# Patient Record
Sex: MALE | Race: ASIAN | NOT HISPANIC OR LATINO | Employment: FULL TIME | URBAN - METROPOLITAN AREA
[De-identification: names, ages, dates, MRNs, and addresses within clinical notes are randomized per-mention and may not be internally consistent; named-entity substitution may affect disease eponyms.]

---

## 2017-01-04 ENCOUNTER — HOSPITAL ENCOUNTER (OUTPATIENT)
Dept: LAB | Facility: MEDICAL CENTER | Age: 57
End: 2017-01-04
Attending: INTERNAL MEDICINE
Payer: COMMERCIAL

## 2017-01-04 DIAGNOSIS — I25.84 CORONARY ARTERY DISEASE DUE TO CALCIFIED CORONARY LESION: ICD-10-CM

## 2017-01-04 DIAGNOSIS — I10 ESSENTIAL HYPERTENSION: ICD-10-CM

## 2017-01-04 DIAGNOSIS — I25.10 CORONARY ARTERY DISEASE DUE TO CALCIFIED CORONARY LESION: ICD-10-CM

## 2017-01-04 DIAGNOSIS — E78.5 HYPERLIPIDEMIA: ICD-10-CM

## 2017-01-04 LAB
ALBUMIN SERPL BCP-MCNC: 4.5 G/DL (ref 3.2–4.9)
ALBUMIN/GLOB SERPL: 1.3 G/DL
ALP SERPL-CCNC: 142 U/L (ref 30–99)
ALT SERPL-CCNC: 88 U/L (ref 2–50)
ANION GAP SERPL CALC-SCNC: 6 MMOL/L (ref 0–11.9)
AST SERPL-CCNC: 65 U/L (ref 12–45)
BILIRUB SERPL-MCNC: 0.7 MG/DL (ref 0.1–1.5)
BUN SERPL-MCNC: 28 MG/DL (ref 8–22)
CALCIUM SERPL-MCNC: 9.7 MG/DL (ref 8.4–10.2)
CHLORIDE SERPL-SCNC: 107 MMOL/L (ref 96–112)
CHOLEST SERPL-MCNC: 174 MG/DL (ref 100–199)
CO2 SERPL-SCNC: 26 MMOL/L (ref 20–33)
CREAT SERPL-MCNC: 1.87 MG/DL (ref 0.5–1.4)
GFR SERPL CREATININE-BSD FRML MDRD: 38 ML/MIN/1.73 M 2
GLOBULIN SER CALC-MCNC: 3.4 G/DL (ref 1.9–3.5)
GLUCOSE SERPL-MCNC: 107 MG/DL (ref 65–99)
HDLC SERPL-MCNC: 36 MG/DL
LDLC SERPL CALC-MCNC: 107 MG/DL
POTASSIUM SERPL-SCNC: 4.1 MMOL/L (ref 3.6–5.5)
PROT SERPL-MCNC: 7.9 G/DL (ref 6–8.2)
SODIUM SERPL-SCNC: 139 MMOL/L (ref 135–145)
TRIGL SERPL-MCNC: 153 MG/DL (ref 0–149)

## 2017-01-04 PROCEDURE — 36415 COLL VENOUS BLD VENIPUNCTURE: CPT

## 2017-01-04 PROCEDURE — 80053 COMPREHEN METABOLIC PANEL: CPT

## 2017-01-04 PROCEDURE — 80061 LIPID PANEL: CPT

## 2017-01-10 ENCOUNTER — OFFICE VISIT (OUTPATIENT)
Dept: CARDIOLOGY | Facility: MEDICAL CENTER | Age: 57
End: 2017-01-10
Payer: COMMERCIAL

## 2017-01-10 VITALS
BODY MASS INDEX: 36.65 KG/M2 | HEART RATE: 70 BPM | DIASTOLIC BLOOD PRESSURE: 74 MMHG | WEIGHT: 220 LBS | SYSTOLIC BLOOD PRESSURE: 124 MMHG | HEIGHT: 65 IN | OXYGEN SATURATION: 94 %

## 2017-01-10 DIAGNOSIS — I10 ESSENTIAL HYPERTENSION: ICD-10-CM

## 2017-01-10 DIAGNOSIS — I25.84 CORONARY ARTERY DISEASE DUE TO CALCIFIED CORONARY LESION: ICD-10-CM

## 2017-01-10 DIAGNOSIS — Z95.1 S/P CABG (CORONARY ARTERY BYPASS GRAFT): Chronic | ICD-10-CM

## 2017-01-10 DIAGNOSIS — E78.5 DYSLIPIDEMIA: ICD-10-CM

## 2017-01-10 DIAGNOSIS — I25.10 CORONARY ARTERY DISEASE DUE TO CALCIFIED CORONARY LESION: ICD-10-CM

## 2017-01-10 PROCEDURE — 99214 OFFICE O/P EST MOD 30 MIN: CPT | Performed by: INTERNAL MEDICINE

## 2017-01-10 NOTE — Clinical Note
[facility]   [address]  [place] [address]  Phone: [phone]  Fax: [phone]              Severo Coleman  1960    Encounter Date: 1/10/2017    Tez Tena M.D.          PROGRESS NOTE:  Subjective:   Severo Coleman is a 56 y.o. male who presents today for followup of his coronary artery disease with prior coronary artery bypass graft surgery, hypertension and hyperlipidemia.     He occasionally notes some edema. He has had no chest discomfort, dyspnea, palpitations or lightheadedness.        Past Medical History   Diagnosis Date   • CAD (coronary artery disease)    • Obesity 5/14/2012   • Hyperlipidemia 5/14/2012   • S/P CABG (coronary artery bypass graft)    • HTN (hypertension) 6/30/2014   • Heart valve disease    • Renal disorder      kidney stones   • High cholesterol    • Myocardial infarct (HCC) 1998   • Congestive heart failure (HCC)      Past Surgical History   Procedure Laterality Date   • Multiple coronary artery bypass  1998     Family History   Problem Relation Age of Onset   • Heart Attack Mother      History   Smoking status   • Never Smoker    Smokeless tobacco   • Not on file     No Known Allergies  Outpatient Encounter Prescriptions as of 1/10/2017   Medication Sig Dispense Refill   • atenolol (TENORMIN) 25 MG Tab Take 1 Tab by mouth every day. 30 Tab 11   • losartan (COZAAR) 25 MG Tab Take 25 mg by mouth every day.     • vitamin D, Ergocalciferol, (DRISDOL) 93047 UNITS Cap capsule Take  by mouth every 14 days.     • folic acid (FOLVITE) 1 MG Tab Take 2 Tabs by mouth 2 Times a Day. 120 Tab 11   • rosuvastatin (CRESTOR) 40 MG tablet Take 1 Tab by mouth every evening. 90 Tab 1   • Pyridoxine HCl (VITAMIN B6 PO) Take  by mouth every day.     • Coenzyme Q10 (CO Q-10 PO) Take  by mouth every day.     • cyanocobalamin (VITAMIN B-12) 100 MCG TABS Take 100 mcg by mouth every day.       • aspirin 81 MG tablet Take 81 mg by  "mouth every day.       No facility-administered encounter medications on file as of 1/10/2017.     ROS       Objective:   /74 mmHg  Pulse 70  Ht 1.651 m (5' 5\")  Wt 99.791 kg (220 lb)  BMI 36.61 kg/m2  SpO2 94%    Physical Exam   Neck: No JVD present.   Cardiovascular: Normal rate and regular rhythm.  Exam reveals no gallop.    No murmur heard.  Pulmonary/Chest: Effort normal. He has no rales.   Abdominal: Soft. There is no tenderness.   Musculoskeletal: He exhibits no edema.       Assessment:     1. Coronary artery disease with a history of coronary bypass graft surgery     2. Dyslipidemia     3. Essential hypertension         Medical Decision Making:  Today's Assessment / Status / Plan:     Subjective:   Severo Coleman is a 54 y.o. male who presents today for followup of his coronary artery disease with prior coronary artery bypass graft surgery, hypertension and hyperlipidemia. He has had some elevation of his liver function tests on statin therapy. However, these seem to be chronic.    His blood pressures at home and been under good control running approximately 120-130/60-70.     He denies any chest discomfort, dyspnea, edema, palpitations or lightheadedness.        Past Medical History   Diagnosis Date   • CAD (coronary artery disease)    • Obesity 5/14/2012   • Hyperlipidemia 5/14/2012   • S/P CABG (coronary artery bypass graft)    • HTN (hypertension) 6/30/2014   • Heart valve disease    • Renal disorder      kidney stones   • High cholesterol    • Myocardial infarct (HCC) 1998   • Congestive heart failure (HCC)      Past Surgical History   Procedure Laterality Date   • Multiple coronary artery bypass  1998     Family History   Problem Relation Age of Onset   • Heart Attack Mother      History   Smoking status   • Never Smoker    Smokeless tobacco   • Not on file     No Known Allergies  Outpatient Encounter Prescriptions as of 1/10/2017   Medication Sig Dispense Refill   • atenolol (TENORMIN) " "25 MG Tab Take 1 Tab by mouth every day. 30 Tab 11   • losartan (COZAAR) 25 MG Tab Take 25 mg by mouth every day.     • vitamin D, Ergocalciferol, (DRISDOL) 17840 UNITS Cap capsule Take  by mouth every 14 days.     • folic acid (FOLVITE) 1 MG Tab Take 2 Tabs by mouth 2 Times a Day. 120 Tab 11   • rosuvastatin (CRESTOR) 40 MG tablet Take 1 Tab by mouth every evening. 90 Tab 1   • Pyridoxine HCl (VITAMIN B6 PO) Take  by mouth every day.     • Coenzyme Q10 (CO Q-10 PO) Take  by mouth every day.     • cyanocobalamin (VITAMIN B-12) 100 MCG TABS Take 100 mcg by mouth every day.       • aspirin 81 MG tablet Take 81 mg by mouth every day.       No facility-administered encounter medications on file as of 1/10/2017.     ROS       Objective:   /74 mmHg  Pulse 70  Ht 1.651 m (5' 5\")  Wt 99.791 kg (220 lb)  BMI 36.61 kg/m2  SpO2 94%    Physical Exam   Neck: No JVD present.   Cardiovascular: Normal rate and regular rhythm.  Exam reveals no gallop.    No murmur heard.  Pulmonary/Chest: Effort normal. He has no rales.   Abdominal: Soft. There is no tenderness.   Musculoskeletal: He exhibits no edema.     Echocardiogram:  CONCLUSIONS  Normal left ventricular systolic function.  Mild to moderate concentric left ventricular hypertrophy.  Left ventricular ejection fraction is 55% to 60%.  No significant valve abnormalities.   Final Date: 27 August 2013     Lab Results   Component Value Date/Time    CHOLESTEROL, 01/04/2017 09:16 AM    * 01/04/2017 09:16 AM    HDL 36* 01/04/2017 09:16 AM    TRIGLYCERIDES 153* 01/04/2017 09:16 AM       Lab Results   Component Value Date/Time    SODIUM 139 01/04/2017 09:16 AM    POTASSIUM 4.1 01/04/2017 09:16 AM    CHLORIDE 107 01/04/2017 09:16 AM    CO2 26 01/04/2017 09:16 AM    GLUCOSE 107* 01/04/2017 09:16 AM    BUN 28* 01/04/2017 09:16 AM    CREATININE 1.87* 01/04/2017 09:16 AM     Lab Results   Component Value Date/Time    ALKALINE PHOSPHATASE 142* 01/04/2017 09:16 AM    " AST(SGOT) 65* 01/04/2017 09:16 AM    ALT(SGPT) 88* 01/04/2017 09:16 AM    TOTAL BILIRUBIN 0.7 01/04/2017 09:16 AM      No results found for: BNPBTYPENAT           Assessment:     1. Coronary artery disease with a history of coronary bypass graft surgery     2. Dyslipidemia     3. Essential hypertension         Medical Decision Making:  Today's Assessment / Status / Plan:     Mr. Coleman is clinically stable. His liver functions are chronically elevated but his lipid status appears to be under good control on Crestor and ezetimibe. He will continue these medications and follow-up in 6 months with repeat lab work. He is asymptomatic from a cardiovascular standpoint and his blood pressure is under good control. However, his renal function is deteriorating and I feel he should see his PCP. He may need referral to nephrology.  Subjective:   Severo Coleman is a 54 y.o. male who presents today for followup of his coronary artery disease with prior coronary artery bypass graft surgery, hypertension and hyperlipidemia. He has had some elevation of his liver function tests on statin therapy. However, these seem to be chronic.    His blood pressures at home and been under good control running approximately 120-130/60-70.     He denies any chest discomfort, dyspnea, edema, palpitations or lightheadedness.        Past Medical History   Diagnosis Date   • CAD (coronary artery disease)    • Obesity 5/14/2012   • Hyperlipidemia 5/14/2012   • S/P CABG (coronary artery bypass graft)    • HTN (hypertension) 6/30/2014   • Heart valve disease    • Renal disorder      kidney stones   • High cholesterol    • Myocardial infarct (HCC) 1998   • Congestive heart failure (HCC)      Past Surgical History   Procedure Laterality Date   • Multiple coronary artery bypass  1998     Family History   Problem Relation Age of Onset   • Heart Attack Mother      History   Smoking status   • Never Smoker    Smokeless tobacco   • Not on file     No  "Known Allergies  Outpatient Encounter Prescriptions as of 1/10/2017   Medication Sig Dispense Refill   • atenolol (TENORMIN) 25 MG Tab Take 1 Tab by mouth every day. 30 Tab 11   • losartan (COZAAR) 25 MG Tab Take 25 mg by mouth every day.     • vitamin D, Ergocalciferol, (DRISDOL) 12496 UNITS Cap capsule Take  by mouth every 14 days.     • folic acid (FOLVITE) 1 MG Tab Take 2 Tabs by mouth 2 Times a Day. 120 Tab 11   • rosuvastatin (CRESTOR) 40 MG tablet Take 1 Tab by mouth every evening. 90 Tab 1   • Pyridoxine HCl (VITAMIN B6 PO) Take  by mouth every day.     • Coenzyme Q10 (CO Q-10 PO) Take  by mouth every day.     • cyanocobalamin (VITAMIN B-12) 100 MCG TABS Take 100 mcg by mouth every day.       • aspirin 81 MG tablet Take 81 mg by mouth every day.       No facility-administered encounter medications on file as of 1/10/2017.     ROS       Objective:   /74 mmHg  Pulse 70  Ht 1.651 m (5' 5\")  Wt 99.791 kg (220 lb)  BMI 36.61 kg/m2  SpO2 94%    Physical Exam   Neck: No JVD present.   Cardiovascular: Normal rate and regular rhythm.  Exam reveals no gallop.    No murmur heard.  Pulmonary/Chest: Effort normal. He has no rales.   Abdominal: Soft. There is no tenderness.   Musculoskeletal: He exhibits no edema.     Echocardiogram:  CONCLUSIONS  Normal left ventricular systolic function.  Mild to moderate concentric left ventricular hypertrophy.  Left ventricular ejection fraction is 55% to 60%.  No significant valve abnormalities.   Final Date: 27 August 2013     Lab Results   Component Value Date/Time    CHOLESTEROL, 01/04/2017 09:16 AM    * 01/04/2017 09:16 AM    HDL 36* 01/04/2017 09:16 AM    TRIGLYCERIDES 153* 01/04/2017 09:16 AM       Lab Results   Component Value Date/Time    SODIUM 139 01/04/2017 09:16 AM    POTASSIUM 4.1 01/04/2017 09:16 AM    CHLORIDE 107 01/04/2017 09:16 AM    CO2 26 01/04/2017 09:16 AM    GLUCOSE 107* 01/04/2017 09:16 AM    BUN 28* 01/04/2017 09:16 AM    CREATININE " 1.87* 01/04/2017 09:16 AM     Lab Results   Component Value Date/Time    ALKALINE PHOSPHATASE 142* 01/04/2017 09:16 AM    AST(SGOT) 65* 01/04/2017 09:16 AM    ALT(SGPT) 88* 01/04/2017 09:16 AM    TOTAL BILIRUBIN 0.7 01/04/2017 09:16 AM      No results found for: BNPBTYPENAT           Assessment:     1. Coronary artery disease with a history of coronary bypass graft surgery     2. Dyslipidemia     3. Essential hypertension         Medical Decision Making:  Today's Assessment / Status / Plan:     Mr. Coleman is clinically stable. He is on maximal statin therapy and his LDL is still not under good control. At this time, we will see if we can get him approved for a PCS K9 inhibitor. He'll have lab work in 6 weeks and prior to follow-up in 6 months. His blood pressure has been under good control. He is asymptomatic from a cardiovascular standpoint but not getting much in the way of aerobic conditioning. I would like him to walk at least 30 minutes 3 times a week. In addition, I would like him to get and activity tracker and obtain at least 10,000 steps daily.      Dmitriy Anglin D.O.  [address]   [address]  [facility] [address]  VIA Facsimile: [phone]

## 2017-01-10 NOTE — PROGRESS NOTES
"Subjective:   Severo Coleman is a 56 y.o. male who presents today for followup of his coronary artery disease with prior coronary artery bypass graft surgery, hypertension and hyperlipidemia.     He occasionally notes some edema. He has had no chest discomfort, dyspnea, palpitations or lightheadedness.        Past Medical History   Diagnosis Date   • CAD (coronary artery disease)    • Obesity 5/14/2012   • Hyperlipidemia 5/14/2012   • S/P CABG (coronary artery bypass graft)    • HTN (hypertension) 6/30/2014   • Heart valve disease    • Renal disorder      kidney stones   • High cholesterol    • Myocardial infarct (HCC) 1998   • Congestive heart failure (HCC)      Past Surgical History   Procedure Laterality Date   • Multiple coronary artery bypass  1998     Family History   Problem Relation Age of Onset   • Heart Attack Mother      History   Smoking status   • Never Smoker    Smokeless tobacco   • Not on file     No Known Allergies  Outpatient Encounter Prescriptions as of 1/10/2017   Medication Sig Dispense Refill   • atenolol (TENORMIN) 25 MG Tab Take 1 Tab by mouth every day. 30 Tab 11   • losartan (COZAAR) 25 MG Tab Take 25 mg by mouth every day.     • vitamin D, Ergocalciferol, (DRISDOL) 95056 UNITS Cap capsule Take  by mouth every 14 days.     • folic acid (FOLVITE) 1 MG Tab Take 2 Tabs by mouth 2 Times a Day. 120 Tab 11   • rosuvastatin (CRESTOR) 40 MG tablet Take 1 Tab by mouth every evening. 90 Tab 1   • Pyridoxine HCl (VITAMIN B6 PO) Take  by mouth every day.     • Coenzyme Q10 (CO Q-10 PO) Take  by mouth every day.     • cyanocobalamin (VITAMIN B-12) 100 MCG TABS Take 100 mcg by mouth every day.       • aspirin 81 MG tablet Take 81 mg by mouth every day.       No facility-administered encounter medications on file as of 1/10/2017.     ROS       Objective:   /74 mmHg  Pulse 70  Ht 1.651 m (5' 5\")  Wt 99.791 kg (220 lb)  BMI 36.61 kg/m2  SpO2 94%    Physical Exam   Neck: No JVD present. "   Cardiovascular: Normal rate and regular rhythm.  Exam reveals no gallop.    No murmur heard.  Pulmonary/Chest: Effort normal. He has no rales.   Abdominal: Soft. There is no tenderness.   Musculoskeletal: He exhibits no edema.       Assessment:     1. Coronary artery disease with a history of coronary bypass graft surgery     2. Dyslipidemia     3. Essential hypertension         Medical Decision Making:  Today's Assessment / Status / Plan:     Subjective:   Severo Coleman is a 54 y.o. male who presents today for followup of his coronary artery disease with prior coronary artery bypass graft surgery, hypertension and hyperlipidemia. He has had some elevation of his liver function tests on statin therapy. However, these seem to be chronic.    His blood pressures at home and been under good control running approximately 120-130/60-70.     He denies any chest discomfort, dyspnea, edema, palpitations or lightheadedness.        Past Medical History   Diagnosis Date   • CAD (coronary artery disease)    • Obesity 5/14/2012   • Hyperlipidemia 5/14/2012   • S/P CABG (coronary artery bypass graft)    • HTN (hypertension) 6/30/2014   • Heart valve disease    • Renal disorder      kidney stones   • High cholesterol    • Myocardial infarct (HCC) 1998   • Congestive heart failure (HCC)      Past Surgical History   Procedure Laterality Date   • Multiple coronary artery bypass  1998     Family History   Problem Relation Age of Onset   • Heart Attack Mother      History   Smoking status   • Never Smoker    Smokeless tobacco   • Not on file     No Known Allergies  Outpatient Encounter Prescriptions as of 1/10/2017   Medication Sig Dispense Refill   • atenolol (TENORMIN) 25 MG Tab Take 1 Tab by mouth every day. 30 Tab 11   • losartan (COZAAR) 25 MG Tab Take 25 mg by mouth every day.     • vitamin D, Ergocalciferol, (DRISDOL) 60624 UNITS Cap capsule Take  by mouth every 14 days.     • folic acid (FOLVITE) 1 MG Tab Take 2 Tabs by  "mouth 2 Times a Day. 120 Tab 11   • rosuvastatin (CRESTOR) 40 MG tablet Take 1 Tab by mouth every evening. 90 Tab 1   • Pyridoxine HCl (VITAMIN B6 PO) Take  by mouth every day.     • Coenzyme Q10 (CO Q-10 PO) Take  by mouth every day.     • cyanocobalamin (VITAMIN B-12) 100 MCG TABS Take 100 mcg by mouth every day.       • aspirin 81 MG tablet Take 81 mg by mouth every day.       No facility-administered encounter medications on file as of 1/10/2017.     ROS       Objective:   /74 mmHg  Pulse 70  Ht 1.651 m (5' 5\")  Wt 99.791 kg (220 lb)  BMI 36.61 kg/m2  SpO2 94%    Physical Exam   Neck: No JVD present.   Cardiovascular: Normal rate and regular rhythm.  Exam reveals no gallop.    No murmur heard.  Pulmonary/Chest: Effort normal. He has no rales.   Abdominal: Soft. There is no tenderness.   Musculoskeletal: He exhibits no edema.     Echocardiogram:  CONCLUSIONS  Normal left ventricular systolic function.  Mild to moderate concentric left ventricular hypertrophy.  Left ventricular ejection fraction is 55% to 60%.  No significant valve abnormalities.   Final Date: 27 August 2013     Lab Results   Component Value Date/Time    CHOLESTEROL, 01/04/2017 09:16 AM    * 01/04/2017 09:16 AM    HDL 36* 01/04/2017 09:16 AM    TRIGLYCERIDES 153* 01/04/2017 09:16 AM       Lab Results   Component Value Date/Time    SODIUM 139 01/04/2017 09:16 AM    POTASSIUM 4.1 01/04/2017 09:16 AM    CHLORIDE 107 01/04/2017 09:16 AM    CO2 26 01/04/2017 09:16 AM    GLUCOSE 107* 01/04/2017 09:16 AM    BUN 28* 01/04/2017 09:16 AM    CREATININE 1.87* 01/04/2017 09:16 AM     Lab Results   Component Value Date/Time    ALKALINE PHOSPHATASE 142* 01/04/2017 09:16 AM    AST(SGOT) 65* 01/04/2017 09:16 AM    ALT(SGPT) 88* 01/04/2017 09:16 AM    TOTAL BILIRUBIN 0.7 01/04/2017 09:16 AM      No results found for: BNPBTYPENAT           Assessment:     1. Coronary artery disease with a history of coronary bypass graft surgery     2. " Dyslipidemia     3. Essential hypertension         Medical Decision Making:  Today's Assessment / Status / Plan:     Mr. Coleman is clinically stable. His liver functions are chronically elevated but his lipid status appears to be under good control on Crestor and ezetimibe. He will continue these medications and follow-up in 6 months with repeat lab work. He is asymptomatic from a cardiovascular standpoint and his blood pressure is under good control. However, his renal function is deteriorating and I feel he should see his PCP. He may need referral to nephrology.  Subjective:   Severo Coleman is a 54 y.o. male who presents today for followup of his coronary artery disease with prior coronary artery bypass graft surgery, hypertension and hyperlipidemia. He has had some elevation of his liver function tests on statin therapy. However, these seem to be chronic.    His blood pressures at home and been under good control running approximately 120-130/60-70.     He denies any chest discomfort, dyspnea, edema, palpitations or lightheadedness.        Past Medical History   Diagnosis Date   • CAD (coronary artery disease)    • Obesity 5/14/2012   • Hyperlipidemia 5/14/2012   • S/P CABG (coronary artery bypass graft)    • HTN (hypertension) 6/30/2014   • Heart valve disease    • Renal disorder      kidney stones   • High cholesterol    • Myocardial infarct (HCC) 1998   • Congestive heart failure (HCC)      Past Surgical History   Procedure Laterality Date   • Multiple coronary artery bypass  1998     Family History   Problem Relation Age of Onset   • Heart Attack Mother      History   Smoking status   • Never Smoker    Smokeless tobacco   • Not on file     No Known Allergies  Outpatient Encounter Prescriptions as of 1/10/2017   Medication Sig Dispense Refill   • atenolol (TENORMIN) 25 MG Tab Take 1 Tab by mouth every day. 30 Tab 11   • losartan (COZAAR) 25 MG Tab Take 25 mg by mouth every day.     • vitamin D,  "Ergocalciferol, (DRISDOL) 40370 UNITS Cap capsule Take  by mouth every 14 days.     • folic acid (FOLVITE) 1 MG Tab Take 2 Tabs by mouth 2 Times a Day. 120 Tab 11   • rosuvastatin (CRESTOR) 40 MG tablet Take 1 Tab by mouth every evening. 90 Tab 1   • Pyridoxine HCl (VITAMIN B6 PO) Take  by mouth every day.     • Coenzyme Q10 (CO Q-10 PO) Take  by mouth every day.     • cyanocobalamin (VITAMIN B-12) 100 MCG TABS Take 100 mcg by mouth every day.       • aspirin 81 MG tablet Take 81 mg by mouth every day.       No facility-administered encounter medications on file as of 1/10/2017.     ROS       Objective:   /74 mmHg  Pulse 70  Ht 1.651 m (5' 5\")  Wt 99.791 kg (220 lb)  BMI 36.61 kg/m2  SpO2 94%    Physical Exam   Neck: No JVD present.   Cardiovascular: Normal rate and regular rhythm.  Exam reveals no gallop.    No murmur heard.  Pulmonary/Chest: Effort normal. He has no rales.   Abdominal: Soft. There is no tenderness.   Musculoskeletal: He exhibits no edema.     Echocardiogram:  CONCLUSIONS  Normal left ventricular systolic function.  Mild to moderate concentric left ventricular hypertrophy.  Left ventricular ejection fraction is 55% to 60%.  No significant valve abnormalities.   Final Date: 27 August 2013     Lab Results   Component Value Date/Time    CHOLESTEROL, 01/04/2017 09:16 AM    * 01/04/2017 09:16 AM    HDL 36* 01/04/2017 09:16 AM    TRIGLYCERIDES 153* 01/04/2017 09:16 AM       Lab Results   Component Value Date/Time    SODIUM 139 01/04/2017 09:16 AM    POTASSIUM 4.1 01/04/2017 09:16 AM    CHLORIDE 107 01/04/2017 09:16 AM    CO2 26 01/04/2017 09:16 AM    GLUCOSE 107* 01/04/2017 09:16 AM    BUN 28* 01/04/2017 09:16 AM    CREATININE 1.87* 01/04/2017 09:16 AM     Lab Results   Component Value Date/Time    ALKALINE PHOSPHATASE 142* 01/04/2017 09:16 AM    AST(SGOT) 65* 01/04/2017 09:16 AM    ALT(SGPT) 88* 01/04/2017 09:16 AM    TOTAL BILIRUBIN 0.7 01/04/2017 09:16 AM      No results found " for: BNPBTYPENAT           Assessment:     1. Coronary artery disease with a history of coronary bypass graft surgery     2. Dyslipidemia     3. Essential hypertension         Medical Decision Making:  Today's Assessment / Status / Plan:     Mr. Coleman is clinically stable. He is on maximal statin therapy and his LDL is still not under good control. At this time, we will see if we can get him approved for a PCS K9 inhibitor. He'll have lab work in 6 weeks and prior to follow-up in 6 months. His blood pressure has been under good control. He is asymptomatic from a cardiovascular standpoint but not getting much in the way of aerobic conditioning. I would like him to walk at least 30 minutes 3 times a week. In addition, I would like him to get and activity tracker and obtain at least 10,000 steps daily.

## 2017-01-10 NOTE — MR AVS SNAPSHOT
"Severo Coleman   1/10/2017 8:30 AM   Office Visit   MRN: [id number]    Department:  [facility] [place] [facility]   Dept Phone:  [phone]    Description:  Male : 1960   Provider:  Tez Tena M.D.           Allergies as of 1/10/2017     No Known Allergies      You were diagnosed with     Coronary artery disease due to calcified coronary lesion   [5099338]       Dyslipidemia   [659877]       Essential hypertension   [9181583]         Vital Signs     Blood Pressure Pulse Height Weight Body Mass Index Oxygen Saturation    124/74 mmHg 70 1.651 m (5' 5\") 99.791 kg (220 lb) 36.61 kg/m2 94%    Smoking Status                   Never Smoker            Basic Information     Date Of Birth Sex Race Ethnicity Preferred Language    1960 Male  Non- English      Your appointments     Murtaza 10, 2017  8:30 AM   FOLLOW UP with Tez Tena M.D.   [facility] (--)    [address], [address]  [facility] [address]   [phone]              Problem List              ICD-10-CM Priority Class Noted - Resolved        Unknown - Present    Coronary artery disease with a history of coronary bypass graft surgery I25.10, I25.84   2012 - Present    Obesity E66.9   2012 - Present    Dyslipidemia E78.5   2012 - Present    S/P CABG (coronary artery bypass graft) (Chronic) Z95.1   Unknown - Present    Essential hypertension I10   2014 - Present      Health Maintenance        Date Due Completion Dates    IMM DTaP/Tdap/Td Vaccine (1 - Tdap) 1980 ---    COLONOSCOPY 2011 ---    IMM INFLUENZA (1) 2016 ---            Current Immunizations     No immunizations on file.      Below and/or attached are the medications your provider expects you to take. Review all of your home medications and newly ordered medications with your provider and/or pharmacist. Follow medication instructions as directed by your provider and/or pharmacist. Please keep your " medication list with you and share with your provider. Update the information when medications are discontinued, doses are changed, or new medications (including over-the-counter products) are added; and carry medication information at all times in the event of emergency situations     Allergies:  No Known Allergies          Medications  Valid as of: January 10, 2017 -  8:14 AM    Generic Name Brand Name Tablet Size Instructions for use    Aspirin (Tab) aspirin 81 MG Take 81 mg by mouth every day.        Atenolol (Tab) TENORMIN 25 MG Take 1 Tab by mouth every day.        Coenzyme Q10   Take  by mouth every day.        Cyanocobalamin (Tab) VITAMIN B-12 100 MCG Take 100 mcg by mouth every day.          Ergocalciferol (Cap) DRISDOL 55620 UNITS Take  by mouth every 14 days.        Folic Acid (Tab) FOLVITE 1 MG Take 2 Tabs by mouth 2 Times a Day.        Losartan Potassium (Tab) COZAAR 25 MG Take 25 mg by mouth every day.        Pyridoxine HCl   Take  by mouth every day.        Rosuvastatin Calcium (Tab) CRESTOR 40 MG Take 1 Tab by mouth every evening.        .                 Medicines prescribed today were sent to:     [facility] - [place], [place] - [address] [address]    Phone: [phone] Fax: [phone]    Open 24 Hours?: No    [facility] - [place], [place] - [address] [address]    Phone: [phone] Fax: [phone]    Open 24 Hours?: No      Medication refill instructions:       If your prescription bottle indicates you have medication refills left, it is not necessary to call your provider’s office. Please contact your pharmacy and they will refill your medication.    If your prescription bottle indicates you do not have any refills left, you may request refills at any time through one of the following ways: The online Shobutt Babies system (except Urgent Care), by calling your provider’s office, or by asking your pharmacy to contact your  provider’s office with a refill request. Medication refills are processed only during regular business hours and may not be available until the next business day. Your provider may request additional information or to have a follow-up visit with you prior to refilling your medication.   *Please Note: Medication refills are assigned a new Rx number when refilled electronically. Your pharmacy may indicate that no refills were authorized even though a new prescription for the same medication is available at the pharmacy. Please request the medicine by name with the pharmacy before contacting your provider for a refill.        Your To Do List     Future Labs/Procedures Complete By Expires    COMP METABOLIC PANEL  As directed 1/10/2018    LIPID PROFILE  As directed 1/10/2018    LIPID PROFILE  As directed 1/10/2018         Grapevine Talk Access Code: [email or web address]  Expires: 1/16/2017  8:16 AM    Grapevine Talk  A secure, online tool to manage your health information     [employer]’s Grapevine Talk® is a secure, online tool that connects you to your personalized health information from the privacy of your home -- day or night - making it very easy for you to manage your healthcare. Once the activation process is completed, you can even access your medical information using the Grapevine Talk govind, which is available for free in the Apple Govind store or Google Play store.     Grapevine Talk provides the following levels of access (as shown below):   My Chart Features   [facility] Doctor [facility]  Specialists [facility]  Urgent  Care Non-[facility]  Primary Care  Doctor   Email your healthcare team securely and privately 24/7 X X X    Manage appointments: schedule your next appointment; view details of past/upcoming appointments X      Request prescription refills. X      View recent personal medical records, including lab and immunizations X X X X   View health record, including health history, allergies, medications X X X X   Read reports about your  outpatient visits, procedures, consult and ER notes X X X X   See your discharge summary, which is a recap of your hospital and/or ER visit that includes your diagnosis, lab results, and care plan. X X       How to register for Unique Microguides:  1. Go to  [email or web address].  2. Click on the Sign Up Now box, which takes you to the New Member Sign Up page. You will need to provide the following information:  a. Enter your Unique Microguides Access Code exactly as it appears at the top of this page. (You will not need to use this code after you’ve completed the sign-up process. If you do not sign up before the expiration date, you must request a new code.)   b. Enter your date of birth.   c. Enter your home email address.   d. Click Submit, and follow the next screen’s instructions.  3. Create a Unique Microguides ID. This will be your Unique Microguides login ID and cannot be changed, so think of one that is secure and easy to remember.  4. Create a Unique Microguides password. You can change your password at any time.  5. Enter your Password Reset Question and Answer. This can be used at a later time if you forget your password.   6. Enter your e-mail address. This allows you to receive e-mail notifications when new information is available in Unique Microguides.  7. Click Sign Up. You can now view your health information.    For assistance activating your Unique Microguides account, call [phone]

## 2017-03-01 ENCOUNTER — HOSPITAL ENCOUNTER (OUTPATIENT)
Dept: LAB | Facility: MEDICAL CENTER | Age: 57
End: 2017-03-01
Attending: INTERNAL MEDICINE
Payer: COMMERCIAL

## 2017-03-01 ENCOUNTER — HOSPITAL ENCOUNTER (OUTPATIENT)
Dept: LAB | Facility: MEDICAL CENTER | Age: 57
End: 2017-03-01
Attending: SPECIALIST
Payer: COMMERCIAL

## 2017-03-01 DIAGNOSIS — I25.84 CORONARY ARTERY DISEASE DUE TO CALCIFIED CORONARY LESION: ICD-10-CM

## 2017-03-01 DIAGNOSIS — I25.10 CORONARY ARTERY DISEASE DUE TO CALCIFIED CORONARY LESION: ICD-10-CM

## 2017-03-01 DIAGNOSIS — E78.5 DYSLIPIDEMIA: ICD-10-CM

## 2017-03-01 LAB
ALBUMIN SERPL BCP-MCNC: 4.2 G/DL (ref 3.2–4.9)
ALBUMIN SERPL BCP-MCNC: 4.3 G/DL (ref 3.2–4.9)
ALP SERPL-CCNC: 122 U/L (ref 30–99)
ALT SERPL-CCNC: 110 U/L (ref 2–50)
APPEARANCE UR: ABNORMAL
AST SERPL-CCNC: 80 U/L (ref 12–45)
BACTERIA #/AREA URNS HPF: ABNORMAL /HPF
BILIRUB CONJ SERPL-MCNC: 0.3 MG/DL (ref 0.1–0.5)
BILIRUB INDIRECT SERPL-MCNC: 0.7 MG/DL (ref 0–1)
BILIRUB SERPL-MCNC: 1 MG/DL (ref 0.1–1.5)
BILIRUB UR QL STRIP.AUTO: NEGATIVE
BUN SERPL-MCNC: 25 MG/DL (ref 8–22)
CALCIUM SERPL-MCNC: 9.4 MG/DL (ref 8.4–10.2)
CHLORIDE SERPL-SCNC: 103 MMOL/L (ref 96–112)
CHOLEST SERPL-MCNC: 118 MG/DL (ref 100–199)
CO2 SERPL-SCNC: 25 MMOL/L (ref 20–33)
COLOR UR: YELLOW
CREAT SERPL-MCNC: 1.76 MG/DL (ref 0.5–1.4)
CREAT UR-MCNC: 124.4 MG/DL
CULTURE IF INDICATED INDCX: YES UA CULTURE
EPI CELLS #/AREA URNS HPF: ABNORMAL /HPF
GFR SERPL CREATININE-BSD FRML MDRD: 40 ML/MIN/1.73 M 2
GLUCOSE SERPL-MCNC: 96 MG/DL (ref 65–99)
GLUCOSE UR STRIP.AUTO-MCNC: NEGATIVE MG/DL
HDLC SERPL-MCNC: 30 MG/DL
KETONES UR STRIP.AUTO-MCNC: NEGATIVE MG/DL
LDLC SERPL CALC-MCNC: 61 MG/DL
LEUKOCYTE ESTERASE UR QL STRIP.AUTO: NEGATIVE
MICRO URNS: ABNORMAL
MUCOUS THREADS #/AREA URNS HPF: ABNORMAL /HPF
NITRITE UR QL STRIP.AUTO: NEGATIVE
PH UR STRIP.AUTO: 6 [PH]
PHOSPHATE SERPL-MCNC: 3.1 MG/DL (ref 2.5–4.5)
POTASSIUM SERPL-SCNC: 4.5 MMOL/L (ref 3.6–5.5)
PROT 24H UR-MRATE: 125.7 MG/DL (ref 0–15)
PROT SERPL-MCNC: 7.5 G/DL (ref 6–8.2)
PROT UR QL STRIP: 100 MG/DL
PROT/CREAT UR: 1010 MG/G (ref 15–68)
RBC # URNS HPF: ABNORMAL /HPF
RBC UR QL AUTO: ABNORMAL
SODIUM SERPL-SCNC: 136 MMOL/L (ref 135–145)
SP GR UR STRIP.AUTO: 1.02
TRIGL SERPL-MCNC: 135 MG/DL (ref 0–149)
WBC #/AREA URNS HPF: ABNORMAL /HPF

## 2017-03-01 PROCEDURE — 36415 COLL VENOUS BLD VENIPUNCTURE: CPT

## 2017-03-01 PROCEDURE — 81001 URINALYSIS AUTO W/SCOPE: CPT

## 2017-03-01 PROCEDURE — 80076 HEPATIC FUNCTION PANEL: CPT

## 2017-03-01 PROCEDURE — 80061 LIPID PANEL: CPT

## 2017-03-01 PROCEDURE — 80069 RENAL FUNCTION PANEL: CPT

## 2017-03-01 PROCEDURE — 84156 ASSAY OF PROTEIN URINE: CPT

## 2017-03-01 PROCEDURE — 82570 ASSAY OF URINE CREATININE: CPT

## 2017-03-01 PROCEDURE — 87086 URINE CULTURE/COLONY COUNT: CPT

## 2017-03-03 LAB
BACTERIA UR CULT: NORMAL
SIGNIFICANT IND 70042: NORMAL
SOURCE SOURCE: NORMAL

## 2017-03-09 ENCOUNTER — OFFICE VISIT (OUTPATIENT)
Dept: CARDIOLOGY | Facility: MEDICAL CENTER | Age: 57
End: 2017-03-09
Payer: COMMERCIAL

## 2017-03-09 VITALS
SYSTOLIC BLOOD PRESSURE: 120 MMHG | WEIGHT: 224 LBS | HEART RATE: 72 BPM | BODY MASS INDEX: 37.32 KG/M2 | DIASTOLIC BLOOD PRESSURE: 80 MMHG | HEIGHT: 65 IN

## 2017-03-09 DIAGNOSIS — I10 ESSENTIAL HYPERTENSION: ICD-10-CM

## 2017-03-09 DIAGNOSIS — I25.84 CORONARY ARTERY DISEASE DUE TO CALCIFIED CORONARY LESION: ICD-10-CM

## 2017-03-09 DIAGNOSIS — I25.10 CORONARY ARTERY DISEASE DUE TO CALCIFIED CORONARY LESION: ICD-10-CM

## 2017-03-09 DIAGNOSIS — E78.5 DYSLIPIDEMIA: ICD-10-CM

## 2017-03-09 PROCEDURE — 99214 OFFICE O/P EST MOD 30 MIN: CPT | Performed by: INTERNAL MEDICINE

## 2017-03-09 NOTE — MR AVS SNAPSHOT
"Severo Coleman   3/9/2017 11:15 AM   Office Visit   MRN: [id number]    Department:  [facility]   Dept Phone:  [phone]    Description:  Male : 1960   Provider:  Tez Tena M.D.           Reason for Visit     Follow-Up           Allergies as of 3/9/2017     No Known Allergies      You were diagnosed with     Coronary artery disease due to calcified coronary lesion   [9325846]       Dyslipidemia   [519807]       Essential hypertension   [8015859]         Vital Signs     Blood Pressure Pulse Height Weight Body Mass Index Smoking Status    120/80 mmHg 72 1.651 m (5' 5\") 101.606 kg (224 lb) 37.28 kg/m2 Never Smoker       Basic Information     Date Of Birth Sex Race Ethnicity Preferred Language    1960 Male  Non- English      Your appointments     2017  8:30 AM   FOLLOW UP with Tez Tena M.D.   [facility] (--)    [address], [address]  [facility] [address] [phone]              Problem List              ICD-10-CM Priority Class Noted - Resolved        Unknown - Present    Coronary artery disease with a history of coronary bypass graft surgery I25.10, I25.84   2012 - Present    Obesity E66.9   2012 - Present    Dyslipidemia E78.5   2012 - Present    S/P CABG (coronary artery bypass graft) (Chronic) Z95.1   Unknown - Present    Essential hypertension I10   2014 - Present      Health Maintenance        Date Due Completion Dates    IMM DTaP/Tdap/Td Vaccine (1 - Tdap) 1980 ---    COLONOSCOPY 2011 ---    IMM INFLUENZA (1) 2016 ---            Current Immunizations     No immunizations on file.      Below and/or attached are the medications your provider expects you to take. Review all of your home medications and newly ordered medications with your provider and/or pharmacist. Follow medication instructions as directed by your provider and/or pharmacist. Please keep your " medication list with you and share with your provider. Update the information when medications are discontinued, doses are changed, or new medications (including over-the-counter products) are added; and carry medication information at all times in the event of emergency situations     Allergies:  No Known Allergies          Medications  Valid as of: March 09, 2017 - 11:49 AM    Generic Name Brand Name Tablet Size Instructions for use    Aspirin (Tab) aspirin 81 MG Take 81 mg by mouth every day.        Atenolol (Tab) TENORMIN 25 MG Take 1 Tab by mouth every day.        Coenzyme Q10   Take  by mouth every day.        Cyanocobalamin (Tab) VITAMIN B-12 100 MCG Take 100 mcg by mouth every day.          Ergocalciferol (Cap) DRISDOL 57927 UNITS Take  by mouth every 14 days.        Folic Acid (Tab) FOLVITE 1 MG Take 2 Tabs by mouth 2 Times a Day.        Losartan Potassium (Tab) COZAAR 25 MG Take 25 mg by mouth every day.        Pyridoxine HCl   Take  by mouth every day.        Rosuvastatin Calcium (Tab) CRESTOR 40 MG Take 1 Tab by mouth every evening.        .                 Medicines prescribed today were sent to:     [facility] - [place], [place] - [address] [facility]    [address] [facility] [address]    Phone: [phone] Fax: [phone]    Open 24 Hours?: No    [facility] - [place], [place] - [address] [facility]    [address] [facility] [address]    Phone: [phone] Fax: [phone]    Open 24 Hours?: No    [place], [place] - [address]    [address] [facility] [address] [facility] [address]    Phone: [phone] Fax: [phone]    Open 24 Hours?: No      Medication refill instructions:       If your prescription bottle indicates you have medication refills left, it is not necessary to call your provider’s office. Please contact your pharmacy and they will refill your medication.    If your prescription bottle indicates you do not have any refills left, you may request  refills at any time through one of the following ways: The online Avenue Right system (except Urgent Care), by calling your provider’s office, or by asking your pharmacy to contact your provider’s office with a refill request. Medication refills are processed only during regular business hours and may not be available until the next business day. Your provider may request additional information or to have a follow-up visit with you prior to refilling your medication.   *Please Note: Medication refills are assigned a new Rx number when refilled electronically. Your pharmacy may indicate that no refills were authorized even though a new prescription for the same medication is available at the pharmacy. Please request the medicine by name with the pharmacy before contacting your provider for a refill.        Your To Do List     Future Labs/Procedures Complete By Expires    COMP METABOLIC PANEL  As directed 3/9/2018    LIPID PROFILE  As directed 3/9/2018         Avenue Right Access Code: [email or web address]  Expires: 3/31/2017  2:35 PM    Avenue Right  A secure, online tool to manage your health information     [employer]’s Avenue Right® is a secure, online tool that connects you to your personalized health information from the privacy of your home -- day or night - making it very easy for you to manage your healthcare. Once the activation process is completed, you can even access your medical information using the Avenue Right govind, which is available for free in the Apple Govind store or Google Play store.     Avenue Right provides the following levels of access (as shown below):   My Chart Features   [facility]  Special[facility]  Urgent  Care Non-[facility]  Primary Care  Doctor   Email your healthcare team securely and privately 24/7 X X X    Manage appointments: schedule your next appointment; view details of past/upcoming appointments X      Request prescription refills. X      View recent personal medical records, including  lab and immunizations X X X X   View health record, including health history, allergies, medications X X X X   Read reports about your outpatient visits, procedures, consult and ER notes X X X X   See your discharge summary, which is a recap of your hospital and/or ER visit that includes your diagnosis, lab results, and care plan. X X       How to register for Smart Destinations:  1. Go to  [email or web address].  2. Click on the Sign Up Now box, which takes you to the New Member Sign Up page. You will need to provide the following information:  a. Enter your Smart Destinations Access Code exactly as it appears at the top of this page. (You will not need to use this code after you’ve completed the sign-up process. If you do not sign up before the expiration date, you must request a new code.)   b. Enter your date of birth.   c. Enter your home email address.   d. Click Submit, and follow the next screen’s instructions.  3. Create a Smart Destinations ID. This will be your Smart Destinations login ID and cannot be changed, so think of one that is secure and easy to remember.  4. Create a Smart Destinations password. You can change your password at any time.  5. Enter your Password Reset Question and Answer. This can be used at a later time if you forget your password.   6. Enter your e-mail address. This allows you to receive e-mail notifications when new information is available in Smart Destinations.  7. Click Sign Up. You can now view your health information.    For assistance activating your Smart Destinations account, call [phone]

## 2017-03-09 NOTE — PROGRESS NOTES
Subjective:   Severo Coleman is a 56 y.o. male who presents today for followup of his coronary artery disease with prior coronary artery bypass graft surgery, hypertension and hyperlipidemia. We were going to try to get him on a PC SK-9 inhibitor. However, there was difficulty getting this approved and he is not yet obtained this medication.     He has had no chest discomfort, dyspnea, edema, palpitations or lightheadedness.        Past Medical History   Diagnosis Date   • CAD (coronary artery disease)    • Obesity 5/14/2012   • Hyperlipidemia 5/14/2012   • S/P CABG (coronary artery bypass graft)    • HTN (hypertension) 6/30/2014   • Heart valve disease    • Renal disorder      kidney stones   • High cholesterol    • Myocardial infarct (CMS-HCC) 1998   • Congestive heart failure (CMS-HCC)      Past Surgical History   Procedure Laterality Date   • Multiple coronary artery bypass  1998     Family History   Problem Relation Age of Onset   • Heart Attack Mother      History   Smoking status   • Never Smoker    Smokeless tobacco   • Never Used     No Known Allergies  Outpatient Encounter Prescriptions as of 3/9/2017   Medication Sig Dispense Refill   • Alirocumab 75 MG/ML Solution Pen-injector Inject 75 mg as instructed every 14 days. 2 PEN 11   • atenolol (TENORMIN) 25 MG Tab Take 1 Tab by mouth every day. 30 Tab 11   • losartan (COZAAR) 25 MG Tab Take 25 mg by mouth every day.     • vitamin D, Ergocalciferol, (DRISDOL) 33725 UNITS Cap capsule Take  by mouth every 14 days.     • folic acid (FOLVITE) 1 MG Tab Take 2 Tabs by mouth 2 Times a Day. 120 Tab 11   • rosuvastatin (CRESTOR) 40 MG tablet Take 1 Tab by mouth every evening. 90 Tab 1   • Pyridoxine HCl (VITAMIN B6 PO) Take  by mouth every day.     • Coenzyme Q10 (CO Q-10 PO) Take  by mouth every day.     • cyanocobalamin (VITAMIN B-12) 100 MCG TABS Take 100 mcg by mouth every day.       • aspirin 81 MG tablet Take 81 mg by mouth every day.       No  "facility-administered encounter medications on file as of 3/9/2017.     ROS       Objective:   /80 mmHg  Pulse 72  Ht 1.651 m (5' 5\")  Wt 101.606 kg (224 lb)  BMI 37.28 kg/m2    Physical Exam   Neck: No JVD present.   Cardiovascular: Normal rate and regular rhythm.  Exam reveals no gallop.    No murmur heard.  Pulmonary/Chest: Effort normal. He has no rales.   Abdominal: Soft. There is no tenderness.   Musculoskeletal: He exhibits no edema.     Lab Results   Component Value Date/Time    CHOLESTEROL, 03/01/2017 07:42 AM    LDL 61 03/01/2017 07:42 AM    HDL 30* 03/01/2017 07:42 AM    TRIGLYCERIDES 135 03/01/2017 07:42 AM       Lab Results   Component Value Date/Time    SODIUM 136 03/01/2017 07:44 AM    POTASSIUM 4.5 03/01/2017 07:44 AM    CHLORIDE 103 03/01/2017 07:44 AM    CO2 25 03/01/2017 07:44 AM    GLUCOSE 96 03/01/2017 07:44 AM    BUN 25* 03/01/2017 07:44 AM    CREATININE 1.76* 03/01/2017 07:44 AM     Lab Results   Component Value Date/Time    ALKALINE PHOSPHATASE 122* 03/01/2017 07:46 AM    AST(SGOT) 80* 03/01/2017 07:46 AM    ALT(SGPT) 110* 03/01/2017 07:46 AM    TOTAL BILIRUBIN 1.0 03/01/2017 07:46 AM      No results found for: BNPBTYPENAT       Assessment:     1. Coronary artery disease with a history of coronary bypass graft surgery     2. Dyslipidemia     3. Essential hypertension         Medical Decision Making:  Today's Assessment / Status / Plan:     Mr. Coleman is clinically stable. He did not start the PCS K-9 inhibitor. However, he has had significant improvement in his LDL. I would like him to continue dietary therapy, exercise and weight loss. He'll follow-up in 6 months with repeat lab work. As long as his LDL remains under reasonable control, I do not feel we need to modify his medical therapy. His blood pressure is under good control and he is asymptomatic from a cardiovascular standpoint.  "

## 2017-03-09 NOTE — Clinical Note
[facility]   [address]  [place] [address]  Phone: [phone]  Fax: [phone]              Severo Coleman  1960    Encounter Date: 3/9/2017    Tez Tena M.D.          PROGRESS NOTE:  Subjective:   Severo Coleman is a 56 y.o. male who presents today for followup of his coronary artery disease with prior coronary artery bypass graft surgery, hypertension and hyperlipidemia. We were going to try to get him on a PC SK-9 inhibitor. However, there was difficulty getting this approved and he is not yet obtained this medication.     He has had no chest discomfort, dyspnea, edema, palpitations or lightheadedness.        Past Medical History   Diagnosis Date   • CAD (coronary artery disease)    • Obesity 5/14/2012   • Hyperlipidemia 5/14/2012   • S/P CABG (coronary artery bypass graft)    • HTN (hypertension) 6/30/2014   • Heart valve disease    • Renal disorder      kidney stones   • High cholesterol    • Myocardial infarct (CMS-HCC) 1998   • Congestive heart failure (CMS-HCC)      Past Surgical History   Procedure Laterality Date   • Multiple coronary artery bypass  1998     Family History   Problem Relation Age of Onset   • Heart Attack Mother      History   Smoking status   • Never Smoker    Smokeless tobacco   • Never Used     No Known Allergies  Outpatient Encounter Prescriptions as of 3/9/2017   Medication Sig Dispense Refill   • Alirocumab 75 MG/ML Solution Pen-injector Inject 75 mg as instructed every 14 days. 2 PEN 11   • atenolol (TENORMIN) 25 MG Tab Take 1 Tab by mouth every day. 30 Tab 11   • losartan (COZAAR) 25 MG Tab Take 25 mg by mouth every day.     • vitamin D, Ergocalciferol, (DRISDOL) 11914 UNITS Cap capsule Take  by mouth every 14 days.     • folic acid (FOLVITE) 1 MG Tab Take 2 Tabs by mouth 2 Times a Day. 120 Tab 11   • rosuvastatin (CRESTOR) 40 MG tablet Take 1 Tab by mouth every evening. 90 Tab 1   • Pyridoxine  "HCl (VITAMIN B6 PO) Take  by mouth every day.     • Coenzyme Q10 (CO Q-10 PO) Take  by mouth every day.     • cyanocobalamin (VITAMIN B-12) 100 MCG TABS Take 100 mcg by mouth every day.       • aspirin 81 MG tablet Take 81 mg by mouth every day.       No facility-administered encounter medications on file as of 3/9/2017.     ROS       Objective:   /80 mmHg  Pulse 72  Ht 1.651 m (5' 5\")  Wt 101.606 kg (224 lb)  BMI 37.28 kg/m2    Physical Exam   Neck: No JVD present.   Cardiovascular: Normal rate and regular rhythm.  Exam reveals no gallop.    No murmur heard.  Pulmonary/Chest: Effort normal. He has no rales.   Abdominal: Soft. There is no tenderness.   Musculoskeletal: He exhibits no edema.     Lab Results   Component Value Date/Time    CHOLESTEROL, 03/01/2017 07:42 AM    LDL 61 03/01/2017 07:42 AM    HDL 30* 03/01/2017 07:42 AM    TRIGLYCERIDES 135 03/01/2017 07:42 AM       Lab Results   Component Value Date/Time    SODIUM 136 03/01/2017 07:44 AM    POTASSIUM 4.5 03/01/2017 07:44 AM    CHLORIDE 103 03/01/2017 07:44 AM    CO2 25 03/01/2017 07:44 AM    GLUCOSE 96 03/01/2017 07:44 AM    BUN 25* 03/01/2017 07:44 AM    CREATININE 1.76* 03/01/2017 07:44 AM     Lab Results   Component Value Date/Time    ALKALINE PHOSPHATASE 122* 03/01/2017 07:46 AM    AST(SGOT) 80* 03/01/2017 07:46 AM    ALT(SGPT) 110* 03/01/2017 07:46 AM    TOTAL BILIRUBIN 1.0 03/01/2017 07:46 AM      No results found for: BNPBTYPENAT       Assessment:     1. Coronary artery disease with a history of coronary bypass graft surgery     2. Dyslipidemia     3. Essential hypertension         Medical Decision Making:  Today's Assessment / Status / Plan:     Mr. Coleman is clinically stable. He did not start the PCS K-9 inhibitor. However, he has had significant improvement in his LDL. I would like him to continue dietary therapy, exercise and weight loss. He'll follow-up in 6 months with repeat lab work. As long as his LDL remains under " reasonable control, I do not feel we need to modify his medical therapy. His blood pressure is under good control and he is asymptomatic from a cardiovascular standpoint.      Dmitriy Anglin D.O.  [address]   [address]  [place] [address]  VIA Facsimile: [phone]

## 2017-04-22 DIAGNOSIS — E78.5 DYSLIPIDEMIA: ICD-10-CM

## 2017-04-24 RX ORDER — ROSUVASTATIN CALCIUM 40 MG/1
40 TABLET, COATED ORAL EVERY EVENING
Qty: 90 TAB | Refills: 3 | OUTPATIENT
Start: 2017-04-24 | End: 2018-01-09

## 2017-06-15 ENCOUNTER — HOSPITAL ENCOUNTER (OUTPATIENT)
Dept: LAB | Facility: MEDICAL CENTER | Age: 57
End: 2017-06-15
Attending: INTERNAL MEDICINE
Payer: COMMERCIAL

## 2017-06-15 LAB
25(OH)D3 SERPL-MCNC: 40 NG/ML (ref 30–100)
ALBUMIN SERPL BCP-MCNC: 4.2 G/DL (ref 3.2–4.9)
APPEARANCE UR: CLEAR
BACTERIA #/AREA URNS HPF: ABNORMAL /HPF
BASOPHILS # BLD AUTO: 0.6 % (ref 0–1.8)
BASOPHILS # BLD: 0.05 K/UL (ref 0–0.12)
BILIRUB UR QL STRIP.AUTO: NEGATIVE
BUN SERPL-MCNC: 23 MG/DL (ref 8–22)
CALCIUM SERPL-MCNC: 9.6 MG/DL (ref 8.5–10.5)
CHLORIDE SERPL-SCNC: 103 MMOL/L (ref 96–112)
CO2 SERPL-SCNC: 25 MMOL/L (ref 20–33)
COLOR UR: YELLOW
CREAT SERPL-MCNC: 1.68 MG/DL (ref 0.5–1.4)
CREAT UR-MCNC: 132.2 MG/DL
CULTURE IF INDICATED INDCX: YES UA CULTURE
EOSINOPHIL # BLD AUTO: 0.18 K/UL (ref 0–0.51)
EOSINOPHIL NFR BLD: 2.2 % (ref 0–6.9)
ERYTHROCYTE [DISTWIDTH] IN BLOOD BY AUTOMATED COUNT: 48.1 FL (ref 35.9–50)
GFR SERPL CREATININE-BSD FRML MDRD: 43 ML/MIN/1.73 M 2
GLUCOSE SERPL-MCNC: 107 MG/DL (ref 65–99)
GLUCOSE UR STRIP.AUTO-MCNC: NEGATIVE MG/DL
HCT VFR BLD AUTO: 61.9 % (ref 42–52)
HGB BLD-MCNC: 19.6 G/DL (ref 14–18)
IMM GRANULOCYTES # BLD AUTO: 0.06 K/UL (ref 0–0.11)
IMM GRANULOCYTES NFR BLD AUTO: 0.7 % (ref 0–0.9)
KETONES UR STRIP.AUTO-MCNC: NEGATIVE MG/DL
LEUKOCYTE ESTERASE UR QL STRIP.AUTO: ABNORMAL
LYMPHOCYTES # BLD AUTO: 1.52 K/UL (ref 1–4.8)
LYMPHOCYTES NFR BLD: 18.8 % (ref 22–41)
MCH RBC QN AUTO: 27.3 PG (ref 27–33)
MCHC RBC AUTO-ENTMCNC: 31.7 G/DL (ref 33.7–35.3)
MCV RBC AUTO: 86.2 FL (ref 81.4–97.8)
MICRO URNS: ABNORMAL
MONOCYTES # BLD AUTO: 0.9 K/UL (ref 0–0.85)
MONOCYTES NFR BLD AUTO: 11.1 % (ref 0–13.4)
MUCOUS THREADS #/AREA URNS HPF: ABNORMAL /HPF
NEUTROPHILS # BLD AUTO: 5.38 K/UL (ref 1.82–7.42)
NEUTROPHILS NFR BLD: 66.6 % (ref 44–72)
NITRITE UR QL STRIP.AUTO: NEGATIVE
NRBC # BLD AUTO: 0 K/UL
NRBC BLD AUTO-RTO: 0 /100 WBC
PH UR STRIP.AUTO: 6 [PH]
PHOSPHATE SERPL-MCNC: 2.8 MG/DL (ref 2.5–4.5)
PLATELET # BLD AUTO: 159 K/UL (ref 164–446)
PMV BLD AUTO: 11.1 FL (ref 9–12.9)
POTASSIUM SERPL-SCNC: 4.4 MMOL/L (ref 3.6–5.5)
PROT UR QL STRIP: 70 MG/DL
PROT UR-MCNC: 88.1 MG/DL (ref 0–15)
PROT/CREAT UR: 666 MG/G (ref 15–68)
PTH-INTACT SERPL-MCNC: 62.5 PG/ML (ref 14–72)
RBC # BLD AUTO: 7.18 M/UL (ref 4.7–6.1)
RBC # URNS HPF: ABNORMAL /HPF
RBC UR QL AUTO: ABNORMAL
SODIUM SERPL-SCNC: 135 MMOL/L (ref 135–145)
SP GR UR STRIP.AUTO: 1.01
URATE SERPL-MCNC: 6.1 MG/DL (ref 2.5–8.3)
WBC # BLD AUTO: 8.1 K/UL (ref 4.8–10.8)
WBC #/AREA URNS HPF: ABNORMAL /HPF

## 2017-06-15 PROCEDURE — 84156 ASSAY OF PROTEIN URINE: CPT

## 2017-06-15 PROCEDURE — 80069 RENAL FUNCTION PANEL: CPT

## 2017-06-15 PROCEDURE — 87086 URINE CULTURE/COLONY COUNT: CPT

## 2017-06-15 PROCEDURE — 83970 ASSAY OF PARATHORMONE: CPT

## 2017-06-15 PROCEDURE — 81001 URINALYSIS AUTO W/SCOPE: CPT

## 2017-06-15 PROCEDURE — 84550 ASSAY OF BLOOD/URIC ACID: CPT

## 2017-06-15 PROCEDURE — 82570 ASSAY OF URINE CREATININE: CPT

## 2017-06-15 PROCEDURE — 85025 COMPLETE CBC W/AUTO DIFF WBC: CPT

## 2017-06-15 PROCEDURE — 36415 COLL VENOUS BLD VENIPUNCTURE: CPT

## 2017-06-15 PROCEDURE — 82306 VITAMIN D 25 HYDROXY: CPT

## 2017-06-17 LAB
BACTERIA UR CULT: NORMAL
SIGNIFICANT IND 70042: NORMAL
SOURCE SOURCE: NORMAL

## 2017-06-21 DIAGNOSIS — I25.10 CORONARY ARTERY DISEASE INVOLVING NATIVE CORONARY ARTERY OF NATIVE HEART WITHOUT ANGINA PECTORIS: ICD-10-CM

## 2017-06-22 RX ORDER — FOLIC ACID 1 MG/1
2 TABLET ORAL 2 TIMES DAILY
Qty: 120 TAB | Refills: 11 | Status: SHIPPED | OUTPATIENT
Start: 2017-06-22

## 2017-07-05 ENCOUNTER — HOSPITAL ENCOUNTER (OUTPATIENT)
Dept: LAB | Facility: MEDICAL CENTER | Age: 57
End: 2017-07-05
Attending: INTERNAL MEDICINE
Payer: COMMERCIAL

## 2017-07-05 DIAGNOSIS — I25.84 CORONARY ARTERY DISEASE DUE TO CALCIFIED CORONARY LESION: ICD-10-CM

## 2017-07-05 DIAGNOSIS — E78.5 DYSLIPIDEMIA: ICD-10-CM

## 2017-07-05 DIAGNOSIS — I25.10 CORONARY ARTERY DISEASE DUE TO CALCIFIED CORONARY LESION: ICD-10-CM

## 2017-07-05 DIAGNOSIS — I10 ESSENTIAL HYPERTENSION: ICD-10-CM

## 2017-07-05 LAB
ALBUMIN SERPL BCP-MCNC: 4 G/DL (ref 3.2–4.9)
ALBUMIN/GLOB SERPL: 1.3 G/DL
ALP SERPL-CCNC: 138 U/L (ref 30–99)
ALT SERPL-CCNC: 75 U/L (ref 2–50)
ANION GAP SERPL CALC-SCNC: 9 MMOL/L (ref 0–11.9)
AST SERPL-CCNC: 48 U/L (ref 12–45)
BILIRUB SERPL-MCNC: 0.8 MG/DL (ref 0.1–1.5)
BUN SERPL-MCNC: 27 MG/DL (ref 8–22)
CALCIUM SERPL-MCNC: 9.5 MG/DL (ref 8.5–10.5)
CHLORIDE SERPL-SCNC: 104 MMOL/L (ref 96–112)
CHOLEST SERPL-MCNC: 169 MG/DL (ref 100–199)
CO2 SERPL-SCNC: 24 MMOL/L (ref 20–33)
CREAT SERPL-MCNC: 1.66 MG/DL (ref 0.5–1.4)
GFR SERPL CREATININE-BSD FRML MDRD: 43 ML/MIN/1.73 M 2
GLOBULIN SER CALC-MCNC: 3.2 G/DL (ref 1.9–3.5)
GLUCOSE SERPL-MCNC: 112 MG/DL (ref 65–99)
HDLC SERPL-MCNC: 35 MG/DL
LDLC SERPL CALC-MCNC: 101 MG/DL
POTASSIUM SERPL-SCNC: 4.3 MMOL/L (ref 3.6–5.5)
PROT SERPL-MCNC: 7.2 G/DL (ref 6–8.2)
SODIUM SERPL-SCNC: 137 MMOL/L (ref 135–145)
TRIGL SERPL-MCNC: 167 MG/DL (ref 0–149)

## 2017-07-05 PROCEDURE — 36415 COLL VENOUS BLD VENIPUNCTURE: CPT

## 2017-07-05 PROCEDURE — 80053 COMPREHEN METABOLIC PANEL: CPT

## 2017-07-05 PROCEDURE — 80061 LIPID PANEL: CPT

## 2017-07-12 ENCOUNTER — OFFICE VISIT (OUTPATIENT)
Dept: CARDIOLOGY | Facility: MEDICAL CENTER | Age: 57
End: 2017-07-12
Payer: COMMERCIAL

## 2017-07-12 VITALS
HEART RATE: 48 BPM | SYSTOLIC BLOOD PRESSURE: 126 MMHG | WEIGHT: 227 LBS | HEIGHT: 65 IN | DIASTOLIC BLOOD PRESSURE: 82 MMHG | BODY MASS INDEX: 37.82 KG/M2 | OXYGEN SATURATION: 95 %

## 2017-07-12 DIAGNOSIS — I25.84 CORONARY ARTERY DISEASE DUE TO CALCIFIED CORONARY LESION: ICD-10-CM

## 2017-07-12 DIAGNOSIS — I10 ESSENTIAL HYPERTENSION: ICD-10-CM

## 2017-07-12 DIAGNOSIS — I25.10 CORONARY ARTERY DISEASE DUE TO CALCIFIED CORONARY LESION: ICD-10-CM

## 2017-07-12 DIAGNOSIS — E78.5 DYSLIPIDEMIA: ICD-10-CM

## 2017-07-12 PROCEDURE — 99214 OFFICE O/P EST MOD 30 MIN: CPT | Performed by: INTERNAL MEDICINE

## 2017-07-12 RX ORDER — EZETIMIBE 10 MG/1
10 TABLET ORAL DAILY
Qty: 30 TAB | Refills: 11 | Status: SHIPPED | OUTPATIENT
Start: 2017-07-12 | End: 2018-04-04 | Stop reason: SDUPTHER

## 2017-07-12 NOTE — PROGRESS NOTES
"Subjective:   Severo Coleman is a 56 y.o. male who presents today for followup of his coronary artery disease with prior coronary artery bypass graft surgery, hypertension and hyperlipidemia. His LDL dropped previously and he did not qualify for a PCSK-9 inhibitor     He has had no chest discomfort, dyspnea, edema, palpitations or lightheadedness.        Past Medical History   Diagnosis Date   • CAD (coronary artery disease)    • Obesity 5/14/2012   • Hyperlipidemia 5/14/2012   • S/P CABG (coronary artery bypass graft)    • HTN (hypertension) 6/30/2014   • Heart valve disease    • Renal disorder      kidney stones   • High cholesterol    • Myocardial infarct (CMS-HCC) 1998   • Congestive heart failure (CMS-HCC)      Past Surgical History   Procedure Laterality Date   • Multiple coronary artery bypass  1998     Family History   Problem Relation Age of Onset   • Heart Attack Mother      History   Smoking status   • Never Smoker    Smokeless tobacco   • Never Used     No Known Allergies  Outpatient Encounter Prescriptions as of 7/12/2017   Medication Sig Dispense Refill   • folic acid (FOLVITE) 1 MG Tab Take 2 Tabs by mouth 2 Times a Day. 120 Tab 11   • rosuvastatin (CRESTOR) 40 MG tablet Take 1 Tab by mouth every evening. 90 Tab 3   • atenolol (TENORMIN) 25 MG Tab Take 1 Tab by mouth every day. 30 Tab 11   • losartan (COZAAR) 25 MG Tab Take 25 mg by mouth every day.     • vitamin D, Ergocalciferol, (DRISDOL) 82013 UNITS Cap capsule Take  by mouth every 14 days.     • Pyridoxine HCl (VITAMIN B6 PO) Take  by mouth every day.     • Coenzyme Q10 (CO Q-10 PO) Take  by mouth every day.     • cyanocobalamin (VITAMIN B-12) 100 MCG TABS Take 100 mcg by mouth every day.       • aspirin 81 MG tablet Take 81 mg by mouth every day.       No facility-administered encounter medications on file as of 7/12/2017.     ROS       Objective:   /82 mmHg  Pulse 48  Ht 1.651 m (5' 5\")  Wt 102.967 kg (227 lb)  BMI 37.77 kg/m2  " SpO2 95%    Physical Exam   Neck: No JVD present.   Cardiovascular: Regular rhythm.  Bradycardia present.  Exam reveals no gallop.    No murmur heard.  Pulmonary/Chest: Effort normal. He has no rales.   Abdominal: Soft. There is no tenderness.   Musculoskeletal: He exhibits no edema.     Lab Results   Component Value Date/Time    CHOLESTEROL, 07/05/2017 08:11 AM    * 07/05/2017 08:11 AM    HDL 35* 07/05/2017 08:11 AM    TRIGLYCERIDES 167* 07/05/2017 08:11 AM       Lab Results   Component Value Date/Time    SODIUM 137 07/05/2017 08:11 AM    POTASSIUM 4.3 07/05/2017 08:11 AM    CHLORIDE 104 07/05/2017 08:11 AM    CO2 24 07/05/2017 08:11 AM    GLUCOSE 112* 07/05/2017 08:11 AM    BUN 27* 07/05/2017 08:11 AM    CREATININE 1.66* 07/05/2017 08:11 AM     Lab Results   Component Value Date/Time    ALKALINE PHOSPHATASE 138* 07/05/2017 08:11 AM    AST(SGOT) 48* 07/05/2017 08:11 AM    ALT(SGPT) 75* 07/05/2017 08:11 AM    TOTAL BILIRUBIN 0.8 07/05/2017 08:11 AM      No results found for: BNPBTYPENAT       Assessment:     1. Coronary artery disease with a history of coronary bypass graft surgery     2. Dyslipidemia     3. Essential hypertension         Medical Decision Making:  Today's Assessment / Status / Plan:     Mr. Coleman is clinically stable. Patient has had a deterioration in his lipid status. He states he has difficulty with dietary compliance. We'll try placing him on ezetimibe 10 mg daily. If this is too expensive for him, we will again try to get him on a PCSK-9 inhibitor. He will otherwise follow-up in about 6 months.

## 2017-07-12 NOTE — MR AVS SNAPSHOT
"        Severo Coleman   2017 8:30 AM   Office Visit   MRN: [id number]    Department:  [facility]   Dept Phone:  [phone]    Description:  Male : 1960   Provider:  Tez Tena M.D.           Reason for Visit     Follow-Up           Allergies as of 2017     No Known Allergies      You were diagnosed with     Coronary artery disease due to calcified coronary lesion   [3376610]       Dyslipidemia   [045516]       Essential hypertension   [5224825]         Vital Signs     Blood Pressure Pulse Height Weight Body Mass Index Oxygen Saturation    126/82 mmHg 48 1.651 m (5' 5\") 102.967 kg (227 lb) 37.77 kg/m2 95%    Smoking Status                   Never Smoker            Basic Information     Date Of Birth Sex Race Ethnicity Preferred Language    1960 Male  Non- English      Your appointments     2017  8:30 AM   FOLLOW UP with Tez Tena M.D.   [facility] (--)    [address], [address]  [facility] [id number] [phone]              Problem List              ICD-10-CM Priority Class Noted - Resolved        Unknown - Present    Coronary artery disease with a history of coronary bypass graft surgery I25.10, I25.84   2012 - Present    Obesity E66.9   2012 - Present    Dyslipidemia E78.5   2012 - Present    S/P CABG (coronary artery bypass graft) (Chronic) Z95.1   Unknown - Present    Essential hypertension I10   2014 - Present      Health Maintenance        Date Due Completion Dates    IMM DTaP/Tdap/Td Vaccine (1 - Tdap) 1980 ---    COLONOSCOPY 2011 ---    IMM INFLUENZA (1) 2017 ---            Current Immunizations     No immunizations on file.      Below and/or attached are the medications your provider expects you to take. Review all of your home medications and newly ordered medications with your provider and/or pharmacist. Follow medication instructions as directed by your " provider and/or pharmacist. Please keep your medication list with you and share with your provider. Update the information when medications are discontinued, doses are changed, or new medications (including over-the-counter products) are added; and carry medication information at all times in the event of emergency situations     Allergies:  No Known Allergies          Medications  Valid as of: July 12, 2017 -  8:12 AM    Generic Name Brand Name Tablet Size Instructions for use    Aspirin (Tab) aspirin 81 MG Take 81 mg by mouth every day.        Atenolol (Tab) TENORMIN 25 MG Take 1 Tab by mouth every day.        Coenzyme Q10   Take  by mouth every day.        Cyanocobalamin (Tab) VITAMIN B-12 100 MCG Take 100 mcg by mouth every day.          Ergocalciferol (Cap) DRISDOL 30141 UNITS Take  by mouth every 14 days.        Ezetimibe (Tab) ZETIA 10 MG Take 1 Tab by mouth every day.        Folic Acid (Tab) FOLVITE 1 MG Take 2 Tabs by mouth 2 Times a Day.        Losartan Potassium (Tab) COZAAR 25 MG Take 25 mg by mouth every day.        Pyridoxine HCl   Take  by mouth every day.        Rosuvastatin Calcium (Tab) CRESTOR 40 MG Take 1 Tab by mouth every evening.        .                 Medicines prescribed today were sent to:     [id number] - [place], [place]  [address] [address]    Phone: [phone] Fax: [phone]    Open 24 Hours?: No    [facility] - [place], [place] - [address] [facility]    [address] [place] [id number]    Phone: [phone] Fax: [phone]    Open 24 Hours?: No      Medication refill instructions:       If your prescription bottle indicates you have medication refills left, it is not necessary to call your provider’s office. Please contact your pharmacy and they will refill your medication.    If your prescription bottle indicates you do not have any refills left, you may request refills at any time through one of the following ways: The online  [employer] system (except Urgent Care), by calling your provider’s office, or by asking your pharmacy to contact your provider’s office with a refill request. Medication refills are processed only during regular business hours and may not be available until the next business day. Your provider may request additional information or to have a follow-up visit with you prior to refilling your medication.   *Please Note: Medication refills are assigned a new Rx number when refilled electronically. Your pharmacy may indicate that no refills were authorized even though a new prescription for the same medication is available at the pharmacy. Please request the medicine by name with the pharmacy before contacting your provider for a refill.        Your To Do List     Future Labs/Procedures Complete By Expires    COMP METABOLIC PANEL  As directed 7/12/2018    LIPID PROFILE  As directed 7/12/2018         [employer] Access Code: [email or web address]  Expires: 8/11/2017  8:12 AM    [employer]  A secure, online tool to manage your health information     [employer]’s [employer]® is a secure, online tool that connects you to your personalized health information from the privacy of your home -- day or night - making it very easy for you to manage your healthcare. Once the activation process is completed, you can even access your medical information using the [employer] govind, which is available for free in the Apple Govind store or Google Play store.     [employer] provides the following levels of access (as shown below):   My Chart Features   [facility] Doctor [facility]  Urgent  Care Non-[facility]  Primary Care  Doctor   Email your healthcare team securely and privately 24/7 X X X    Manage appointments: schedule your next appointment; view details of past/upcoming appointments X      Request prescription refills. X      View recent personal medical records, including lab and immunizations X X X X   View health record, including  health history, allergies, medications X X X X   Read reports about your outpatient visits, procedures, consult and ER notes X X X X   See your discharge summary, which is a recap of your hospital and/or ER visit that includes your diagnosis, lab results, and care plan. X X       How to register for [employer]:  1. Go to  [email or web address].  2. Click on the Sign Up Now box, which takes you to the New Member Sign Up page. You will need to provide the following information:  a. Enter your [employer] Access Code exactly as it appears at the top of this page. (You will not need to use this code after you’ve completed the sign-up process. If you do not sign up before the expiration date, you must request a new code.)   b. Enter your date of birth.   c. Enter your home email address.   d. Click Submit, and follow the next screen’s instructions.  3. Create a [employer] ID. This will be your [employer] login ID and cannot be changed, so think of one that is secure and easy to remember.  4. Create a [employer] password. You can change your password at any time.  5. Enter your Password Reset Question and Answer. This can be used at a later time if you forget your password.   6. Enter your e-mail address. This allows you to receive e-mail notifications when new information is available in [employer].  7. Click Sign Up. You can now view your health information.    For assistance activating your [employer] account, call [phone]

## 2017-07-12 NOTE — Clinical Note
[facility]   [address]  [place] [address]  Phone: [phone]  Fax: [phone]              Severo Coleman  1960    Encounter Date: 7/12/2017    Tez Tena M.D.          PROGRESS NOTE:  Subjective:   Severo Coleman is a 56 y.o. male who presents today for followup of his coronary artery disease with prior coronary artery bypass graft surgery, hypertension and hyperlipidemia. His LDL dropped previously and he did not qualify for a PCSK-9 inhibitor     He has had no chest discomfort, dyspnea, edema, palpitations or lightheadedness.        Past Medical History   Diagnosis Date   • CAD (coronary artery disease)    • Obesity 5/14/2012   • Hyperlipidemia 5/14/2012   • S/P CABG (coronary artery bypass graft)    • HTN (hypertension) 6/30/2014   • Heart valve disease    • Renal disorder      kidney stones   • High cholesterol    • Myocardial infarct (CMS-HCC) 1998   • Congestive heart failure (CMS-HCC)      Past Surgical History   Procedure Laterality Date   • Multiple coronary artery bypass  1998     Family History   Problem Relation Age of Onset   • Heart Attack Mother      History   Smoking status   • Never Smoker    Smokeless tobacco   • Never Used     No Known Allergies  Outpatient Encounter Prescriptions as of 7/12/2017   Medication Sig Dispense Refill   • folic acid (FOLVITE) 1 MG Tab Take 2 Tabs by mouth 2 Times a Day. 120 Tab 11   • rosuvastatin (CRESTOR) 40 MG tablet Take 1 Tab by mouth every evening. 90 Tab 3   • atenolol (TENORMIN) 25 MG Tab Take 1 Tab by mouth every day. 30 Tab 11   • losartan (COZAAR) 25 MG Tab Take 25 mg by mouth every day.     • vitamin D, Ergocalciferol, (DRISDOL) 43275 UNITS Cap capsule Take  by mouth every 14 days.     • Pyridoxine HCl (VITAMIN B6 PO) Take  by mouth every day.     • Coenzyme Q10 (CO Q-10 PO) Take  by mouth every day.     • cyanocobalamin (VITAMIN B-12) 100 MCG TABS Take 100 mcg by mouth  "every day.       • aspirin 81 MG tablet Take 81 mg by mouth every day.       No facility-administered encounter medications on file as of 7/12/2017.     ROS       Objective:   /82 mmHg  Pulse 48  Ht 1.651 m (5' 5\")  Wt 102.967 kg (227 lb)  BMI 37.77 kg/m2  SpO2 95%    Physical Exam   Neck: No JVD present.   Cardiovascular: Regular rhythm.  Bradycardia present.  Exam reveals no gallop.    No murmur heard.  Pulmonary/Chest: Effort normal. He has no rales.   Abdominal: Soft. There is no tenderness.   Musculoskeletal: He exhibits no edema.     Lab Results   Component Value Date/Time    CHOLESTEROL, 07/05/2017 08:11 AM    * 07/05/2017 08:11 AM    HDL 35* 07/05/2017 08:11 AM    TRIGLYCERIDES 167* 07/05/2017 08:11 AM       Lab Results   Component Value Date/Time    SODIUM 137 07/05/2017 08:11 AM    POTASSIUM 4.3 07/05/2017 08:11 AM    CHLORIDE 104 07/05/2017 08:11 AM    CO2 24 07/05/2017 08:11 AM    GLUCOSE 112* 07/05/2017 08:11 AM    BUN 27* 07/05/2017 08:11 AM    CREATININE 1.66* 07/05/2017 08:11 AM     Lab Results   Component Value Date/Time    ALKALINE PHOSPHATASE 138* 07/05/2017 08:11 AM    AST(SGOT) 48* 07/05/2017 08:11 AM    ALT(SGPT) 75* 07/05/2017 08:11 AM    TOTAL BILIRUBIN 0.8 07/05/2017 08:11 AM      No results found for: BNPBTYPENAT       Assessment:     1. Coronary artery disease with a history of coronary bypass graft surgery     2. Dyslipidemia     3. Essential hypertension         Medical Decision Making:  Today's Assessment / Status / Plan:     Mr. Coleman is clinically stable. Patient has had a deterioration in his lipid status. He states he has difficulty with dietary compliance. We'll try placing him on ezetimibe 10 mg daily. If this is too expensive for him, we will again try to get him on a PCSK-9 inhibitor. He will otherwise follow-up in about 6 months.      Dmitriy Anglin D.O.  [address]   [address]  [facility] [address]  VIA Facsimile: [phone]                   "

## 2017-07-14 ENCOUNTER — TELEPHONE (OUTPATIENT)
Dept: CARDIOLOGY | Facility: MEDICAL CENTER | Age: 57
End: 2017-07-14

## 2017-07-14 DIAGNOSIS — I25.10 CORONARY ARTERY DISEASE DUE TO CALCIFIED CORONARY LESION: ICD-10-CM

## 2017-07-14 DIAGNOSIS — E78.5 DYSLIPIDEMIA: ICD-10-CM

## 2017-07-14 DIAGNOSIS — I25.84 CORONARY ARTERY DISEASE DUE TO CALCIFIED CORONARY LESION: ICD-10-CM

## 2017-07-14 DIAGNOSIS — R79.89 ELEVATED LFTS: ICD-10-CM

## 2017-07-14 NOTE — TELEPHONE ENCOUNTER
----- Message from Tez Tena M.D. sent at 7/14/2017  2:53 PM PDT -----  Regarding: RE: Medication Question  LFT'S up, probably due to Crestor. Doubt Zetia will increase further,but he obtain a lipid and cmp in 6 weeks if not already ordered.  ----- Message -----     From: Jacey Soto R.N.     Sent: 7/14/2017  10:50 AM       To: Tez Tena M.D.  Subject: Medication Question                              Hi Severo Multani forgot to mention at his last appointment that he previously took Zetia, but it was discontinued by his PCP due to elevated liver enzymes. He wanted to know if it safe for him to take Zetia from your standpoint.    LEV RAJAN

## 2017-07-14 NOTE — TELEPHONE ENCOUNTER
Tried calling patient, family member answered the phone and states that the patient is at work. Will try again next week.    LEV RAJAN

## 2017-07-27 NOTE — TELEPHONE ENCOUNTER
Called patient again. Patient is aware of Dr. Tena's instructions and will have labs drawn six weeks after starting Zetia.    LEV RAJAN

## 2017-08-21 ENCOUNTER — HOSPITAL ENCOUNTER (OUTPATIENT)
Facility: MEDICAL CENTER | Age: 57
End: 2017-08-21
Attending: PHYSICIAN ASSISTANT
Payer: COMMERCIAL

## 2017-08-21 LAB — CYTOLOGY REG CYTOL: NORMAL

## 2017-08-21 PROCEDURE — 88112 CYTOPATH CELL ENHANCE TECH: CPT

## 2017-09-19 DIAGNOSIS — I25.119 CORONARY ARTERY DISEASE INVOLVING NATIVE CORONARY ARTERY OF NATIVE HEART WITH ANGINA PECTORIS (HCC): ICD-10-CM

## 2017-09-19 RX ORDER — ATENOLOL 25 MG/1
25 TABLET ORAL DAILY
Qty: 30 TAB | Refills: 11 | OUTPATIENT
Start: 2017-09-19 | End: 2017-09-21 | Stop reason: SDUPTHER

## 2017-09-21 ENCOUNTER — HOSPITAL ENCOUNTER (OUTPATIENT)
Dept: LAB | Facility: MEDICAL CENTER | Age: 57
End: 2017-09-21
Attending: INTERNAL MEDICINE
Payer: COMMERCIAL

## 2017-09-21 DIAGNOSIS — I25.84 CORONARY ARTERY DISEASE DUE TO CALCIFIED CORONARY LESION: ICD-10-CM

## 2017-09-21 DIAGNOSIS — E78.5 DYSLIPIDEMIA: ICD-10-CM

## 2017-09-21 DIAGNOSIS — I25.119 CORONARY ARTERY DISEASE INVOLVING NATIVE CORONARY ARTERY OF NATIVE HEART WITH ANGINA PECTORIS (HCC): ICD-10-CM

## 2017-09-21 DIAGNOSIS — I25.10 CORONARY ARTERY DISEASE DUE TO CALCIFIED CORONARY LESION: ICD-10-CM

## 2017-09-21 LAB
ALBUMIN SERPL BCP-MCNC: 4.1 G/DL (ref 3.2–4.9)
ALBUMIN/GLOB SERPL: 1.3 G/DL
ALP SERPL-CCNC: 137 U/L (ref 30–99)
ALT SERPL-CCNC: 168 U/L (ref 2–50)
ANION GAP SERPL CALC-SCNC: 6 MMOL/L (ref 0–11.9)
AST SERPL-CCNC: 107 U/L (ref 12–45)
BILIRUB SERPL-MCNC: 1 MG/DL (ref 0.1–1.5)
BUN SERPL-MCNC: 25 MG/DL (ref 8–22)
CALCIUM SERPL-MCNC: 9.3 MG/DL (ref 8.5–10.5)
CHLORIDE SERPL-SCNC: 105 MMOL/L (ref 96–112)
CHOLEST SERPL-MCNC: 114 MG/DL (ref 100–199)
CO2 SERPL-SCNC: 25 MMOL/L (ref 20–33)
CREAT SERPL-MCNC: 1.39 MG/DL (ref 0.5–1.4)
GFR SERPL CREATININE-BSD FRML MDRD: 53 ML/MIN/1.73 M 2
GLOBULIN SER CALC-MCNC: 3.1 G/DL (ref 1.9–3.5)
GLUCOSE SERPL-MCNC: 118 MG/DL (ref 65–99)
HDLC SERPL-MCNC: 36 MG/DL
LDLC SERPL CALC-MCNC: 57 MG/DL
POTASSIUM SERPL-SCNC: 4.6 MMOL/L (ref 3.6–5.5)
PROT SERPL-MCNC: 7.2 G/DL (ref 6–8.2)
SODIUM SERPL-SCNC: 136 MMOL/L (ref 135–145)
TRIGL SERPL-MCNC: 105 MG/DL (ref 0–149)

## 2017-09-21 PROCEDURE — 80053 COMPREHEN METABOLIC PANEL: CPT

## 2017-09-21 PROCEDURE — 80061 LIPID PANEL: CPT

## 2017-09-21 PROCEDURE — 36415 COLL VENOUS BLD VENIPUNCTURE: CPT

## 2017-09-21 RX ORDER — ATENOLOL 50 MG/1
25 TABLET ORAL DAILY
Qty: 45 TAB | Refills: 3 | OUTPATIENT
Start: 2017-09-21 | End: 2018-09-20 | Stop reason: SDUPTHER

## 2017-12-13 ENCOUNTER — TELEPHONE (OUTPATIENT)
Dept: CARDIOLOGY | Facility: MEDICAL CENTER | Age: 57
End: 2017-12-13

## 2017-12-13 DIAGNOSIS — I10 ESSENTIAL HYPERTENSION: ICD-10-CM

## 2017-12-13 DIAGNOSIS — Z95.1 S/P CABG (CORONARY ARTERY BYPASS GRAFT): Chronic | ICD-10-CM

## 2017-12-13 NOTE — TELEPHONE ENCOUNTER
LIPID PROFILE     Notes Recorded by Tez Tena M.D. on 12/12/2017 at 9:31 PM PST  Dc Crestor, repeat CMP a few days prior to fu.     S/w pt about Lipid and CMP results per Dr. Tena. He is OK with getting repeat CMP done a few days prior to FU on 1/9/18. Denies need for lab slip to be mailed. He states verbal understanding to stop Crestor at this time per FK recommendation.     Order placed for CMP.

## 2018-01-03 ENCOUNTER — HOSPITAL ENCOUNTER (OUTPATIENT)
Dept: LAB | Facility: MEDICAL CENTER | Age: 58
End: 2018-01-03
Attending: INTERNAL MEDICINE
Payer: COMMERCIAL

## 2018-01-03 DIAGNOSIS — I10 ESSENTIAL HYPERTENSION: ICD-10-CM

## 2018-01-03 DIAGNOSIS — Z95.1 S/P CABG (CORONARY ARTERY BYPASS GRAFT): Chronic | ICD-10-CM

## 2018-01-03 LAB
ALBUMIN SERPL BCP-MCNC: 4.2 G/DL (ref 3.2–4.9)
ALBUMIN/GLOB SERPL: 1.4 G/DL
ALP SERPL-CCNC: 144 U/L (ref 30–99)
ALT SERPL-CCNC: 53 U/L (ref 2–50)
ANION GAP SERPL CALC-SCNC: 8 MMOL/L (ref 0–11.9)
AST SERPL-CCNC: 44 U/L (ref 12–45)
BILIRUB SERPL-MCNC: 1 MG/DL (ref 0.1–1.5)
BUN SERPL-MCNC: 24 MG/DL (ref 8–22)
CALCIUM SERPL-MCNC: 9.6 MG/DL (ref 8.5–10.5)
CHLORIDE SERPL-SCNC: 104 MMOL/L (ref 96–112)
CO2 SERPL-SCNC: 25 MMOL/L (ref 20–33)
CREAT SERPL-MCNC: 1.43 MG/DL (ref 0.5–1.4)
GFR SERPL CREATININE-BSD FRML MDRD: 51 ML/MIN/1.73 M 2
GLOBULIN SER CALC-MCNC: 2.9 G/DL (ref 1.9–3.5)
GLUCOSE SERPL-MCNC: 108 MG/DL (ref 65–99)
POTASSIUM SERPL-SCNC: 4.1 MMOL/L (ref 3.6–5.5)
PROT SERPL-MCNC: 7.1 G/DL (ref 6–8.2)
SODIUM SERPL-SCNC: 137 MMOL/L (ref 135–145)

## 2018-01-03 PROCEDURE — 36415 COLL VENOUS BLD VENIPUNCTURE: CPT

## 2018-01-03 PROCEDURE — 80053 COMPREHEN METABOLIC PANEL: CPT

## 2018-01-09 ENCOUNTER — OFFICE VISIT (OUTPATIENT)
Dept: CARDIOLOGY | Facility: MEDICAL CENTER | Age: 58
End: 2018-01-09
Payer: COMMERCIAL

## 2018-01-09 VITALS
SYSTOLIC BLOOD PRESSURE: 120 MMHG | HEART RATE: 72 BPM | HEIGHT: 65 IN | WEIGHT: 226 LBS | DIASTOLIC BLOOD PRESSURE: 70 MMHG | BODY MASS INDEX: 37.65 KG/M2

## 2018-01-09 DIAGNOSIS — E78.5 DYSLIPIDEMIA: ICD-10-CM

## 2018-01-09 DIAGNOSIS — I10 ESSENTIAL HYPERTENSION: ICD-10-CM

## 2018-01-09 DIAGNOSIS — I25.84 CORONARY ARTERY DISEASE DUE TO CALCIFIED CORONARY LESION: ICD-10-CM

## 2018-01-09 DIAGNOSIS — I25.10 CORONARY ARTERY DISEASE DUE TO CALCIFIED CORONARY LESION: ICD-10-CM

## 2018-01-09 PROCEDURE — 99214 OFFICE O/P EST MOD 30 MIN: CPT | Performed by: INTERNAL MEDICINE

## 2018-01-09 RX ORDER — ROSUVASTATIN CALCIUM 10 MG/1
10 TABLET, COATED ORAL EVERY EVENING
Qty: 30 TAB | Refills: 11 | Status: SHIPPED | OUTPATIENT
Start: 2018-01-09 | End: 2019-01-23 | Stop reason: SDUPTHER

## 2018-01-09 NOTE — PROGRESS NOTES
Subjective:   Severo Coleman is a 57 y.o. male who presents today for followup of his coronary artery disease with prior coronary artery bypass graft surgery, hypertension and hyperlipidemia. His LDL dropped previously and he did not qualify for a PCSK-9 inhibitor     He has had no chest discomfort, dyspnea, edema, palpitations or lightheadedness.       He has been walking on his treadmill a couple of times a week for about 20-30 minutes.    Past Medical History:   asdfasdfads Date   • CAD (coronary artery disease)    • Congestive heart failure (CMS-HCC)    • Heart valve disease    • High cholesterol    • HTN (hypertension) 6/30/2014   • Hyperlipidemia 5/14/2012   • Myocardial infarct 1998   • Obesity 5/14/2012   • Renal disorder     kidney stones   • S/P CABG (coronary artery bypass graft)      Past Surgical History:   Procedure Laterality Date   • MULTIPLE CORONARY ARTERY BYPASS  1998     Family History   Problem Relation Age of Onset   • Heart Attack Mother      History   Smoking Status   • Never Smoker   Smokeless Tobacco   • Never Used     No Known Allergies  Outpatient Encounter Prescriptions as of 1/9/2018   Medication Sig Dispense Refill   • atenolol (TENORMIN) 50 MG Tab Take 0.5 Tabs by mouth every day. 45 Tab 3   • ezetimibe (ZETIA) 10 MG Tab Take 1 Tab by mouth every day. 30 Tab 11   • folic acid (FOLVITE) 1 MG Tab Take 2 Tabs by mouth 2 Times a Day. 120 Tab 11   • losartan (COZAAR) 25 MG Tab Take 25 mg by mouth every day.     • Pyridoxine HCl (VITAMIN B6 PO) Take  by mouth every day.     • Coenzyme Q10 (CO Q-10 PO) Take  by mouth every day.     • cyanocobalamin (VITAMIN B-12) 100 MCG TABS Take 100 mcg by mouth every day.       • aspirin 81 MG tablet Take 81 mg by mouth every day.     • rosuvastatin (CRESTOR) 40 MG tablet Take 1 Tab by mouth every evening. 90 Tab 3   • vitamin D, Ergocalciferol, (DRISDOL) 42993 UNITS Cap capsule Take  by mouth every 14 days.       No facility-administered encounter  "medications on file as of 1/9/2018.      ROS       Objective:   /70   Pulse 72   Ht 1.651 m (5' 5\")   Wt 102.5 kg (226 lb)   BMI 37.61 kg/m²     Physical Exam   Neck: No JVD present.   Cardiovascular: Regular rhythm.  Bradycardia present.  Exam reveals no gallop.    No murmur heard.  Pulmonary/Chest: Effort normal. He has no rales.   Abdominal: Soft. There is no tenderness.   Musculoskeletal: He exhibits no edema.     Lab Results   Component Value Date/Time    CHOLSTRLTOT 114 09/21/2017 09:20 AM    LDL 57 09/21/2017 09:20 AM    HDL 36 (A) 09/21/2017 09:20 AM    TRIGLYCERIDE 105 09/21/2017 09:20 AM       Lab Results   Component Value Date/Time    SODIUM 137 01/03/2018 07:49 AM    POTASSIUM 4.1 01/03/2018 07:49 AM    CHLORIDE 104 01/03/2018 07:49 AM    CO2 25 01/03/2018 07:49 AM    GLUCOSE 108 (H) 01/03/2018 07:49 AM    BUN 24 (H) 01/03/2018 07:49 AM    CREATININE 1.43 (H) 01/03/2018 07:49 AM     Lab Results   Component Value Date/Time    ALKPHOSPHAT 144 (H) 01/03/2018 07:49 AM    ASTSGOT 44 01/03/2018 07:49 AM    ALTSGPT 53 (H) 01/03/2018 07:49 AM    TBILIRUBIN 1.0 01/03/2018 07:49 AM      No results found for: BNPBTYPENAT       Assessment:     1. Coronary artery disease with a history of coronary bypass graft surgery     2. Dyslipidemia     3. Essential hypertension         Medical Decision Making:  Today's Assessment / Status / Plan:     Mr. Coleman is clinically stable. However, he had Crestor discontinued because of elevated LFTs. He also had difficulty with elevated LFTs on atorvastatin. At this time, we'll place him back on a lower dose of Crestor at 10 mg daily. He'll have lab work in about 6 weeks and follow-up in a couple of months.  "

## 2018-01-09 NOTE — LETTER
[facility]   [address]  [place] [address]  Phone: [phone]  Fax: [phone]              Severo Coleman  1960    Encounter Date: 1/9/2018    Tez Tena M.D.          PROGRESS NOTE:  Subjective:   Severo Coleman is a 56 y.o. male who presents today for followup of his coronary artery disease with prior coronary artery bypass graft surgery, hypertension and hyperlipidemia. His LDL dropped previously and he did not qualify for a PCSK-9 inhibitor     He has had no chest discomfort, dyspnea, edema, palpitations or lightheadedness.       He has been walking on his treadmill a couple of times a week for about 20-30 minutes.    Past Medical History:   asdfasdfads Date   • CAD (coronary artery disease)    • Congestive heart failure (CMS-[facility])    • Heart valve disease    • High cholesterol    • HTN (hypertension) 6/30/2014   • Hyperlipidemia 5/14/2012   • Myocardial infarct 1998   • Obesity 5/14/2012   • Renal disorder     kidney stones   • S/P CABG (coronary artery bypass graft)      Past Surgical History:   Procedure Laterality Date   • MULTIPLE CORONARY ARTERY BYPASS  1998     Family History   Problem Relation Age of Onset   • Heart Attack Mother      History   Smoking Status   • Never Smoker   Smokeless Tobacco   • Never Used     No Known Allergies  Outpatient Encounter Prescriptions as of 1/9/2018   Medication Sig Dispense Refill   • atenolol (TENORMIN) 50 MG Tab Take 0.5 Tabs by mouth every day. 45 Tab 3   • ezetimibe (ZETIA) 10 MG Tab Take 1 Tab by mouth every day. 30 Tab 11   • folic acid (FOLVITE) 1 MG Tab Take 2 Tabs by mouth 2 Times a Day. 120 Tab 11   • losartan (COZAAR) 25 MG Tab Take 25 mg by mouth every day.     • Pyridoxine HCl (VITAMIN B6 PO) Take  by mouth every day.     • Coenzyme Q10 (CO Q-10 PO) Take  by mouth every day.     • cyanocobalamin (VITAMIN B-12) 100 MCG TABS Take 100 mcg by mouth every day.       • aspirin  "81 MG tablet Take 81 mg by mouth every day.     • rosuvastatin (CRESTOR) 40 MG tablet Take 1 Tab by mouth every evening. 90 Tab 3   • vitamin D, Ergocalciferol, (DRISDOL) 41121 UNITS Cap capsule Take  by mouth every 14 days.       No facility-administered encounter medications on file as of 1/9/2018.      ROS       Objective:   /70   Pulse 72   Ht 1.651 m (5' 5\")   Wt 102.5 kg (226 lb)   BMI 37.61 kg/m²      Physical Exam   Neck: No JVD present.   Cardiovascular: Regular rhythm.  Bradycardia present.  Exam reveals no gallop.    No murmur heard.  Pulmonary/Chest: Effort normal. He has no rales.   Abdominal: Soft. There is no tenderness.   Musculoskeletal: He exhibits no edema.     Lab Results   Component Value Date/Time    CHOLSTRLTOT 114 09/21/2017 09:20 AM    LDL 57 09/21/2017 09:20 AM    HDL 36 (A) 09/21/2017 09:20 AM    TRIGLYCERIDE 105 09/21/2017 09:20 AM       Lab Results   Component Value Date/Time    SODIUM 137 01/03/2018 07:49 AM    POTASSIUM 4.1 01/03/2018 07:49 AM    CHLORIDE 104 01/03/2018 07:49 AM    CO2 25 01/03/2018 07:49 AM    GLUCOSE 108 (H) 01/03/2018 07:49 AM    BUN 24 (H) 01/03/2018 07:49 AM    CREATININE 1.43 (H) 01/03/2018 07:49 AM     Lab Results   Component Value Date/Time    ALKPHOSPHAT 144 (H) 01/03/2018 07:49 AM    ASTSGOT 44 01/03/2018 07:49 AM    ALTSGPT 53 (H) 01/03/2018 07:49 AM    TBILIRUBIN 1.0 01/03/2018 07:49 AM      No results found for: BNPBTYPENAT       Assessment:     1. Coronary artery disease with a history of coronary bypass graft surgery     2. Dyslipidemia     3. Essential hypertension         Medical Decision Making:  Today's Assessment / Status / Plan:     Mr. Coleman is clinically stable. However, he had Crestor discontinued because of elevated LFTs. He also had difficulty with elevated LFTs on atorvastatin. At this time, we'll place him back on a lower dose of Crestor at 10 mg daily. He'll have lab work in about 6 weeks and follow-up in a couple of " months.      Dmitriy Anglin D.O.  [address]  [address]  [facility] [address]  VIA Facsimile: [phone]

## 2018-01-22 ENCOUNTER — HOSPITAL ENCOUNTER (OUTPATIENT)
Facility: MEDICAL CENTER | Age: 58
End: 2018-01-22
Attending: UROLOGY
Payer: COMMERCIAL

## 2018-01-22 LAB — PSA SERPL-MCNC: 0.51 NG/ML (ref 0–4)

## 2018-01-22 PROCEDURE — 84153 ASSAY OF PSA TOTAL: CPT

## 2018-03-03 ENCOUNTER — HOSPITAL ENCOUNTER (OUTPATIENT)
Dept: LAB | Facility: MEDICAL CENTER | Age: 58
End: 2018-03-03
Attending: INTERNAL MEDICINE
Payer: COMMERCIAL

## 2018-03-03 DIAGNOSIS — I25.84 CORONARY ARTERY DISEASE DUE TO CALCIFIED CORONARY LESION: ICD-10-CM

## 2018-03-03 DIAGNOSIS — E78.5 DYSLIPIDEMIA: ICD-10-CM

## 2018-03-03 DIAGNOSIS — I10 ESSENTIAL HYPERTENSION: ICD-10-CM

## 2018-03-03 DIAGNOSIS — I25.10 CORONARY ARTERY DISEASE DUE TO CALCIFIED CORONARY LESION: ICD-10-CM

## 2018-03-03 LAB
ALBUMIN SERPL BCP-MCNC: 4.3 G/DL (ref 3.2–4.9)
ALBUMIN/GLOB SERPL: 1.6 G/DL
ALP SERPL-CCNC: 108 U/L (ref 30–99)
ALT SERPL-CCNC: 74 U/L (ref 2–50)
ANION GAP SERPL CALC-SCNC: 7 MMOL/L (ref 0–11.9)
AST SERPL-CCNC: 56 U/L (ref 12–45)
BILIRUB SERPL-MCNC: 0.9 MG/DL (ref 0.1–1.5)
BUN SERPL-MCNC: 22 MG/DL (ref 8–22)
CALCIUM SERPL-MCNC: 9.1 MG/DL (ref 8.5–10.5)
CHLORIDE SERPL-SCNC: 107 MMOL/L (ref 96–112)
CHOLEST SERPL-MCNC: 134 MG/DL (ref 100–199)
CO2 SERPL-SCNC: 24 MMOL/L (ref 20–33)
CREAT SERPL-MCNC: 1.53 MG/DL (ref 0.5–1.4)
GLOBULIN SER CALC-MCNC: 2.7 G/DL (ref 1.9–3.5)
GLUCOSE SERPL-MCNC: 103 MG/DL (ref 65–99)
HDLC SERPL-MCNC: 32 MG/DL
LDLC SERPL CALC-MCNC: 75 MG/DL
POTASSIUM SERPL-SCNC: 4.3 MMOL/L (ref 3.6–5.5)
PROT SERPL-MCNC: 7 G/DL (ref 6–8.2)
SODIUM SERPL-SCNC: 138 MMOL/L (ref 135–145)
TRIGL SERPL-MCNC: 136 MG/DL (ref 0–149)

## 2018-03-03 PROCEDURE — 80053 COMPREHEN METABOLIC PANEL: CPT

## 2018-03-03 PROCEDURE — 80061 LIPID PANEL: CPT

## 2018-03-03 PROCEDURE — 36415 COLL VENOUS BLD VENIPUNCTURE: CPT

## 2018-03-07 ENCOUNTER — OFFICE VISIT (OUTPATIENT)
Dept: CARDIOLOGY | Facility: MEDICAL CENTER | Age: 58
End: 2018-03-07
Payer: COMMERCIAL

## 2018-03-07 VITALS
DIASTOLIC BLOOD PRESSURE: 72 MMHG | BODY MASS INDEX: 37.65 KG/M2 | OXYGEN SATURATION: 93 % | HEIGHT: 65 IN | WEIGHT: 226 LBS | HEART RATE: 54 BPM | SYSTOLIC BLOOD PRESSURE: 120 MMHG

## 2018-03-07 DIAGNOSIS — I10 ESSENTIAL HYPERTENSION: ICD-10-CM

## 2018-03-07 DIAGNOSIS — I25.84 CORONARY ARTERY DISEASE DUE TO CALCIFIED CORONARY LESION: ICD-10-CM

## 2018-03-07 DIAGNOSIS — E78.5 DYSLIPIDEMIA: ICD-10-CM

## 2018-03-07 DIAGNOSIS — I25.10 CORONARY ARTERY DISEASE DUE TO CALCIFIED CORONARY LESION: ICD-10-CM

## 2018-03-07 PROCEDURE — 99214 OFFICE O/P EST MOD 30 MIN: CPT | Performed by: INTERNAL MEDICINE

## 2018-03-07 NOTE — PROGRESS NOTES
Subjective:   Severo Coleman is a 57 y.o. male who presents today for followup of his coronary artery disease with prior coronary artery bypass graft surgery, hypertension and hyperlipidemia. His LDL dropped previously and he did not qualify for a PCSK-9 inhibitor     He has had no chest discomfort, dyspnea, edema, palpitations or lightheadedness.       He has been walking on his treadmill a3 times a week for about 30 minutes.    Past Medical History:   Diagnosis Date   • CAD (coronary artery disease)    • Congestive heart failure (CMS-HCC)    • Heart valve disease    • High cholesterol    • HTN (hypertension) 6/30/2014   • Hyperlipidemia 5/14/2012   • Myocardial infarct 1998   • Obesity 5/14/2012   • Renal disorder     kidney stones   • S/P CABG (coronary artery bypass graft)      Past Surgical History:   Procedure Laterality Date   • MULTIPLE CORONARY ARTERY BYPASS  1998     Family History   Problem Relation Age of Onset   • Heart Attack Mother      History   Smoking Status   • Never Smoker   Smokeless Tobacco   • Never Used     No Known Allergies  Outpatient Encounter Prescriptions as of 3/7/2018   Medication Sig Dispense Refill   • rosuvastatin (CRESTOR) 10 MG Tab Take 1 Tab by mouth every evening. 30 Tab 11   • atenolol (TENORMIN) 50 MG Tab Take 0.5 Tabs by mouth every day. 45 Tab 3   • ezetimibe (ZETIA) 10 MG Tab Take 1 Tab by mouth every day. 30 Tab 11   • folic acid (FOLVITE) 1 MG Tab Take 2 Tabs by mouth 2 Times a Day. 120 Tab 11   • losartan (COZAAR) 25 MG Tab Take 25 mg by mouth every day.     • Pyridoxine HCl (VITAMIN B6 PO) Take  by mouth every day.     • Coenzyme Q10 (CO Q-10 PO) Take  by mouth every day.     • cyanocobalamin (VITAMIN B-12) 100 MCG TABS Take 100 mcg by mouth every day.       • aspirin 81 MG tablet Take 81 mg by mouth every day.     • vitamin D, Ergocalciferol, (DRISDOL) 54140 UNITS Cap capsule Take  by mouth every 14 days.       No facility-administered encounter medications on  "file as of 3/7/2018.      ROS       Objective:   /72   Pulse (!) 54   Ht 1.651 m (5' 5\")   Wt 102.5 kg (226 lb)   SpO2 93%   BMI 37.61 kg/m²     Physical Exam   Neck: No JVD present.   Cardiovascular: Regular rhythm.  Bradycardia present.  Exam reveals no gallop.    No murmur heard.  Pulmonary/Chest: Effort normal. He has no rales.   Abdominal: Soft. There is no tenderness.   Musculoskeletal: He exhibits no edema.     Lab Results   Component Value Date/Time    CHOLSTRLTOT 134 03/03/2018 08:44 AM    LDL 75 03/03/2018 08:44 AM    HDL 32 (A) 03/03/2018 08:44 AM    TRIGLYCERIDE 136 03/03/2018 08:44 AM       Lab Results   Component Value Date/Time    SODIUM 138 03/03/2018 08:44 AM    POTASSIUM 4.3 03/03/2018 08:44 AM    CHLORIDE 107 03/03/2018 08:44 AM    CO2 24 03/03/2018 08:44 AM    GLUCOSE 103 (H) 03/03/2018 08:44 AM    BUN 22 03/03/2018 08:44 AM    CREATININE 1.53 (H) 03/03/2018 08:44 AM     Lab Results   Component Value Date/Time    ALKPHOSPHAT 108 (H) 03/03/2018 08:44 AM    ASTSGOT 56 (H) 03/03/2018 08:44 AM    ALTSGPT 74 (H) 03/03/2018 08:44 AM    TBILIRUBIN 0.9 03/03/2018 08:44 AM      No results found for: BNPBTYPENAT       Assessment:     1. Coronary artery disease with a history of coronary bypass graft surgery     2. Dyslipidemia     3. Essential hypertension         Medical Decision Making:  Today's Assessment / Status / Plan:     Mr. Coleman is clinically stable. His lipid status is under fair but not optimal control. He's had difficulty with elevated LFTs on increasing statin therapy. We again discussed dietary therapy, increased exercise and weight loss. He will follow-up in about 6 months with repeat lab work. His blood pressure appears to be under good control.  "

## 2018-03-07 NOTE — LETTER
[facility] and [facility]   [address]  [place] [address]  Phone: [phone]  Fax: [phone]              Severo Coleman  1960    Encounter Date: 3/7/2018    Tez Tena M.D.          PROGRESS NOTE:  Subjective:   Severo Coleman is a 57 y.o. male who presents today for followup of his coronary artery disease with prior coronary artery bypass graft surgery, hypertension and hyperlipidemia. His LDL dropped previously and he did not qualify for a PCSK-9 inhibitor     He has had no chest discomfort, dyspnea, edema, palpitations or lightheadedness.       He has been walking on his treadmill a3 times a week for about 30 minutes.    Past Medical History:   Diagnosis Date   • CAD (coronary artery disease)    • Congestive heart failure (CMS-[facility])    • Heart valve disease    • High cholesterol    • HTN (hypertension) 6/30/2014   • Hyperlipidemia 5/14/2012   • Myocardial infarct 1998   • Obesity 5/14/2012   • Renal disorder     kidney stones   • S/P CABG (coronary artery bypass graft)      Past Surgical History:   Procedure Laterality Date   • MULTIPLE CORONARY ARTERY BYPASS  1998     Family History   Problem Relation Age of Onset   • Heart Attack Mother      History   Smoking Status   • Never Smoker   Smokeless Tobacco   • Never Used     No Known Allergies  Outpatient Encounter Prescriptions as of 3/7/2018   Medication Sig Dispense Refill   • rosuvastatin (CRESTOR) 10 MG Tab Take 1 Tab by mouth every evening. 30 Tab 11   • atenolol (TENORMIN) 50 MG Tab Take 0.5 Tabs by mouth every day. 45 Tab 3   • ezetimibe (ZETIA) 10 MG Tab Take 1 Tab by mouth every day. 30 Tab 11   • folic acid (FOLVITE) 1 MG Tab Take 2 Tabs by mouth 2 Times a Day. 120 Tab 11   • losartan (COZAAR) 25 MG Tab Take 25 mg by mouth every day.     • Pyridoxine HCl (VITAMIN B6 PO) Take  by mouth every day.     • Coenzyme Q10 (CO Q-10 PO) Take  by mouth every day.     • cyanocobalamin (VITAMIN  "B-12) 100 MCG TABS Take 100 mcg by mouth every day.       • aspirin 81 MG tablet Take 81 mg by mouth every day.     • vitamin D, Ergocalciferol, (DRISDOL) 93685 UNITS Cap capsule Take  by mouth every 14 days.       No facility-administered encounter medications on file as of 3/7/2018.      ROS       Objective:   /72   Pulse (!) 54   Ht 1.651 m (5' 5\")   Wt 102.5 kg (226 lb)   SpO2 93%   BMI 37.61 kg/m²      Physical Exam   Neck: No JVD present.   Cardiovascular: Regular rhythm.  Bradycardia present.  Exam reveals no gallop.    No murmur heard.  Pulmonary/Chest: Effort normal. He has no rales.   Abdominal: Soft. There is no tenderness.   Musculoskeletal: He exhibits no edema.     Lab Results   Component Value Date/Time    CHOLSTRLTOT 134 03/03/2018 08:44 AM    LDL 75 03/03/2018 08:44 AM    HDL 32 (A) 03/03/2018 08:44 AM    TRIGLYCERIDE 136 03/03/2018 08:44 AM       Lab Results   Component Value Date/Time    SODIUM 138 03/03/2018 08:44 AM    POTASSIUM 4.3 03/03/2018 08:44 AM    CHLORIDE 107 03/03/2018 08:44 AM    CO2 24 03/03/2018 08:44 AM    GLUCOSE 103 (H) 03/03/2018 08:44 AM    BUN 22 03/03/2018 08:44 AM    CREATININE 1.53 (H) 03/03/2018 08:44 AM     Lab Results   Component Value Date/Time    ALKPHOSPHAT 108 (H) 03/03/2018 08:44 AM    ASTSGOT 56 (H) 03/03/2018 08:44 AM    ALTSGPT 74 (H) 03/03/2018 08:44 AM    TBILIRUBIN 0.9 03/03/2018 08:44 AM      No results found for: BNPBTYPENAT       Assessment:     1. Coronary artery disease with a history of coronary bypass graft surgery     2. Dyslipidemia     3. Essential hypertension         Medical Decision Making:  Today's Assessment / Status / Plan:     Mr. Coleman is clinically stable. His lipid status is under fair but not optimal control. He's had difficulty with elevated LFTs on increasing statin therapy. We again discussed dietary therapy, increased exercise and weight loss. He will follow-up in about 6 months with repeat lab work. His blood pressure " appears to be under good control.      Dmitriy Anglin D.O.  [address]  [address]  [facility] [address]  VIA Facsimile: [phone]

## 2018-04-04 DIAGNOSIS — E78.5 DYSLIPIDEMIA: ICD-10-CM

## 2018-04-04 DIAGNOSIS — I25.10 CORONARY ARTERY DISEASE DUE TO CALCIFIED CORONARY LESION: ICD-10-CM

## 2018-04-04 DIAGNOSIS — I25.84 CORONARY ARTERY DISEASE DUE TO CALCIFIED CORONARY LESION: ICD-10-CM

## 2018-04-04 RX ORDER — EZETIMIBE 10 MG/1
10 TABLET ORAL DAILY
Qty: 30 TAB | Refills: 11 | OUTPATIENT
Start: 2018-04-04 | End: 2019-05-08

## 2018-06-14 DIAGNOSIS — I25.10 CORONARY ARTERY DISEASE INVOLVING NATIVE CORONARY ARTERY OF NATIVE HEART WITHOUT ANGINA PECTORIS: ICD-10-CM

## 2018-06-15 DIAGNOSIS — I25.10 CORONARY ARTERY DISEASE INVOLVING NATIVE CORONARY ARTERY OF NATIVE HEART WITHOUT ANGINA PECTORIS: ICD-10-CM

## 2018-06-18 ENCOUNTER — TELEPHONE (OUTPATIENT)
Dept: CARDIOLOGY | Facility: MEDICAL CENTER | Age: 58
End: 2018-06-18

## 2018-06-18 NOTE — TELEPHONE ENCOUNTER
Sanchez with [facility] calling for refill request pt their now   Received: Today   Message Contents   LARA Villareal/Maxine Bradford with [facility] is calling for a refill request for pt's folic acid. Pt is their now. Please call Sanchez at [phone].      Tez Tena M.D.   to Me        6:13 AM   Did not order and no efficacy.       Returned call. Informed of above. Asked Sanchez to talk to patient about why he is taking the medication and perhaps send to patients PCP. He states patient is there but then asks me how to spell PCP's name and states he has no idea why the patient is taking the medication.

## 2018-06-21 RX ORDER — FOLIC ACID 1 MG/1
2 TABLET ORAL 2 TIMES DAILY
Qty: 120 TAB | Refills: 11 | OUTPATIENT
Start: 2018-06-21

## 2018-06-28 RX ORDER — FOLIC ACID 1 MG/1
2 TABLET ORAL 2 TIMES DAILY
Qty: 180 TAB | Refills: 3 | OUTPATIENT
Start: 2018-06-28

## 2018-09-20 DIAGNOSIS — I25.119 CORONARY ARTERY DISEASE INVOLVING NATIVE CORONARY ARTERY OF NATIVE HEART WITH ANGINA PECTORIS (HCC): ICD-10-CM

## 2018-09-20 RX ORDER — ATENOLOL 50 MG/1
25 TABLET ORAL DAILY
Qty: 45 TAB | Refills: 0 | Status: SHIPPED | OUTPATIENT
Start: 2018-09-20 | End: 2018-12-17 | Stop reason: SDUPTHER

## 2018-10-31 ENCOUNTER — HOSPITAL ENCOUNTER (OUTPATIENT)
Dept: LAB | Facility: MEDICAL CENTER | Age: 58
End: 2018-10-31
Attending: INTERNAL MEDICINE
Payer: COMMERCIAL

## 2018-10-31 DIAGNOSIS — I25.10 CORONARY ARTERY DISEASE DUE TO CALCIFIED CORONARY LESION: ICD-10-CM

## 2018-10-31 DIAGNOSIS — E78.5 DYSLIPIDEMIA: ICD-10-CM

## 2018-10-31 DIAGNOSIS — I10 ESSENTIAL HYPERTENSION: ICD-10-CM

## 2018-10-31 DIAGNOSIS — I25.84 CORONARY ARTERY DISEASE DUE TO CALCIFIED CORONARY LESION: ICD-10-CM

## 2018-10-31 PROCEDURE — 36415 COLL VENOUS BLD VENIPUNCTURE: CPT

## 2018-10-31 PROCEDURE — 80053 COMPREHEN METABOLIC PANEL: CPT

## 2018-10-31 PROCEDURE — 80061 LIPID PANEL: CPT

## 2018-11-01 LAB
ALBUMIN SERPL BCP-MCNC: 4.5 G/DL (ref 3.2–4.9)
ALBUMIN/GLOB SERPL: 1.7 G/DL
ALP SERPL-CCNC: 116 U/L (ref 30–99)
ALT SERPL-CCNC: 98 U/L (ref 2–50)
ANION GAP SERPL CALC-SCNC: 9 MMOL/L (ref 0–11.9)
AST SERPL-CCNC: 80 U/L (ref 12–45)
BILIRUB SERPL-MCNC: 1.2 MG/DL (ref 0.1–1.5)
BUN SERPL-MCNC: 22 MG/DL (ref 8–22)
CALCIUM SERPL-MCNC: 9.6 MG/DL (ref 8.5–10.5)
CHLORIDE SERPL-SCNC: 106 MMOL/L (ref 96–112)
CHOLEST SERPL-MCNC: 182 MG/DL (ref 100–199)
CO2 SERPL-SCNC: 25 MMOL/L (ref 20–33)
CREAT SERPL-MCNC: 1.43 MG/DL (ref 0.5–1.4)
FASTING STATUS PATIENT QL REPORTED: NORMAL
GLOBULIN SER CALC-MCNC: 2.6 G/DL (ref 1.9–3.5)
GLUCOSE SERPL-MCNC: 117 MG/DL (ref 65–99)
HDLC SERPL-MCNC: 36 MG/DL
LDLC SERPL CALC-MCNC: 114 MG/DL
POTASSIUM SERPL-SCNC: 4.4 MMOL/L (ref 3.6–5.5)
PROT SERPL-MCNC: 7.1 G/DL (ref 6–8.2)
SODIUM SERPL-SCNC: 140 MMOL/L (ref 135–145)
TRIGL SERPL-MCNC: 162 MG/DL (ref 0–149)

## 2018-11-05 ENCOUNTER — OFFICE VISIT (OUTPATIENT)
Dept: CARDIOLOGY | Facility: MEDICAL CENTER | Age: 58
End: 2018-11-05
Payer: COMMERCIAL

## 2018-11-05 VITALS
WEIGHT: 230 LBS | OXYGEN SATURATION: 95 % | HEIGHT: 65 IN | HEART RATE: 48 BPM | SYSTOLIC BLOOD PRESSURE: 112 MMHG | BODY MASS INDEX: 38.32 KG/M2 | DIASTOLIC BLOOD PRESSURE: 70 MMHG

## 2018-11-05 DIAGNOSIS — I25.10 CORONARY ARTERY DISEASE DUE TO CALCIFIED CORONARY LESION: ICD-10-CM

## 2018-11-05 DIAGNOSIS — I25.84 CORONARY ARTERY DISEASE DUE TO CALCIFIED CORONARY LESION: ICD-10-CM

## 2018-11-05 DIAGNOSIS — E78.5 DYSLIPIDEMIA: ICD-10-CM

## 2018-11-05 DIAGNOSIS — I10 ESSENTIAL HYPERTENSION: ICD-10-CM

## 2018-11-05 PROCEDURE — 99214 OFFICE O/P EST MOD 30 MIN: CPT | Performed by: INTERNAL MEDICINE

## 2018-11-05 NOTE — LETTER
[facility] Heart and Vascular Health[facility]   [address]  [place] [address]  Phone: [phone]  Fax: [phone]              Severo Coleman  1960    Encounter Date: 11/5/2018    Tez Tena M.D.          PROGRESS NOTE:  Chief Complaint   Patient presents with   • Coronary Artery Disease     F/V: 6 MO/ LABS IN EPIC       Subjective:   Severo Coleman is a 57 y.o. male who presents today for followup of his coronary artery disease with prior coronary artery bypass graft surgery, hypertension and hyperlipidemia.    He is exercising regularly without difficulty.  He may be mildly dyspneic when he works out but has no dyspnea on exertion on a flat walk.  He denies any PND, orthopnea or edema.  He has had no chest discomfort, palpitations or lightheadedness.    His blood pressures at home been running about 120-130/70-80.    Past Medical History:   Diagnosis Date   • CAD (coronary artery disease)    • Congestive heart failure (HCC)    • Heart valve disease    • High cholesterol    • HTN (hypertension) 6/30/2014   • Hyperlipidemia 5/14/2012   • Myocardial infarct (HCC) 1998   • Obesity 5/14/2012   • Renal disorder     kidney stones   • S/P CABG (coronary artery bypass graft)      Past Surgical History:   Procedure Laterality Date   • MULTIPLE CORONARY ARTERY BYPASS  1998     Family History   Problem Relation Age of Onset   • Heart Attack Mother      Social History     Social History   • Marital status: Single     Spouse name: N/A   • Number of children: N/A   • Years of education: N/A     Occupational History   • Not on file.     Social History Main Topics   • Smoking status: Never Smoker   • Smokeless tobacco: Never Used   • Alcohol use No   • Drug use: No   • Sexual activity: Not on file     Other Topics Concern   • Not on file     Social History Narrative   • No narrative on file     No Known Allergies  Outpatient Encounter Prescriptions as of 11/5/2018   Medication Sig  "Dispense Refill   • atenolol (TENORMIN) 50 MG Tab Take 0.5 Tabs by mouth every day. 45 Tab 0   • ezetimibe (ZETIA) 10 MG Tab Take 1 Tab by mouth every day. 30 Tab 11   • rosuvastatin (CRESTOR) 10 MG Tab Take 1 Tab by mouth every evening. 30 Tab 11   • folic acid (FOLVITE) 1 MG Tab Take 2 Tabs by mouth 2 Times a Day. 120 Tab 11   • losartan (COZAAR) 25 MG Tab Take 25 mg by mouth every day.     • Pyridoxine HCl (VITAMIN B6 PO) Take  by mouth every day.     • Coenzyme Q10 (CO Q-10 PO) Take  by mouth every day.     • cyanocobalamin (VITAMIN B-12) 100 MCG TABS Take 100 mcg by mouth every day.       • aspirin 81 MG tablet Take 81 mg by mouth every day.     • vitamin D, Ergocalciferol, (DRISDOL) 56209 UNITS Cap capsule Take  by mouth every 14 days.       No facility-administered encounter medications on file as of 11/5/2018.      ROS     Objective:   /70 (BP Location: Left arm, Patient Position: Sitting, BP Cuff Size: Adult)   Pulse (!) 48   Ht 1.651 m (5' 5\")   Wt 104.3 kg (230 lb)   SpO2 95%   BMI 38.27 kg/m²      Physical Exam   Constitutional: He appears well-developed and well-nourished.   Neck: No JVD present.   Cardiovascular: Normal rate and regular rhythm.    No murmur heard.  Pulmonary/Chest: Effort normal and breath sounds normal. He has no rales.   Abdominal: Soft. There is no tenderness.   Musculoskeletal: He exhibits edema (Trace pretibial).     Lab Results   Component Value Date/Time    CHOLSTRLTOT 182 10/31/2018 07:59 AM     (H) 10/31/2018 07:59 AM    HDL 36 (A) 10/31/2018 07:59 AM    TRIGLYCERIDE 162 (H) 10/31/2018 07:59 AM       Lab Results   Component Value Date/Time    SODIUM 140 10/31/2018 07:59 AM    POTASSIUM 4.4 10/31/2018 07:59 AM    CHLORIDE 106 10/31/2018 07:59 AM    CO2 25 10/31/2018 07:59 AM    GLUCOSE 117 (H) 10/31/2018 07:59 AM    BUN 22 10/31/2018 07:59 AM    CREATININE 1.43 (H) 10/31/2018 07:59 AM     Lab Results   Component Value Date/Time    ALKPHOSPHAT 116 (H) " 10/31/2018 07:59 AM    ASTSGOT 80 (H) 10/31/2018 07:59 AM    ALTSGPT 98 (H) 10/31/2018 07:59 AM    TBILIRUBIN 1.2 10/31/2018 07:59 AM      No results found for: BNPBTYPENAT       Assessment:     1. Coronary artery disease with a history of coronary bypass graft surgery : CABG x3 in 1998, occluded SVG to OM with patent LIMA to LAD in 2007     2. Dyslipidemia     3. Essential hypertension         Medical Decision Making:  Today's Assessment / Status / Plan:     Mr. Coleman is clinically stable.  However, his LDL has doubled.  At this time, we will try to get him on a PC SK-9 inhibitor.  In addition, I would like him to check his blood pressures about 3 times a week at various times a day and record these for us.  We may want to try for a slightly lower blood pressure given the current guidelines.  He will follow-up in 6 months with repeat lab work.  I do not feel we can increase rosuvastatin given the fact that he already has some increase in his LFTs.      Dmitriy Anglin D.O.  [address] [place] [address]  VIA Facsimile: [phone]

## 2018-11-05 NOTE — PROGRESS NOTES
Chief Complaint   Patient presents with   • Coronary Artery Disease     F/V: 6 MO/ LABS IN EPIC       Subjective:   Severo Coleman is a 57 y.o. male who presents today for followup of his coronary artery disease with prior coronary artery bypass graft surgery, hypertension and hyperlipidemia.    He is exercising regularly without difficulty.  He may be mildly dyspneic when he works out but has no dyspnea on exertion on a flat walk.  He denies any PND, orthopnea or edema.  He has had no chest discomfort, palpitations or lightheadedness.    His blood pressures at home been running about 120-130/70-80.    Past Medical History:   Diagnosis Date   • CAD (coronary artery disease)    • Congestive heart failure (HCC)    • Heart valve disease    • High cholesterol    • HTN (hypertension) 6/30/2014   • Hyperlipidemia 5/14/2012   • Myocardial infarct (HCC) 1998   • Obesity 5/14/2012   • Renal disorder     kidney stones   • S/P CABG (coronary artery bypass graft)      Past Surgical History:   Procedure Laterality Date   • MULTIPLE CORONARY ARTERY BYPASS  1998     Family History   Problem Relation Age of Onset   • Heart Attack Mother      Social History     Social History   • Marital status: Single     Spouse name: N/A   • Number of children: N/A   • Years of education: N/A     Occupational History   • Not on file.     Social History Main Topics   • Smoking status: Never Smoker   • Smokeless tobacco: Never Used   • Alcohol use No   • Drug use: No   • Sexual activity: Not on file     Other Topics Concern   • Not on file     Social History Narrative   • No narrative on file     No Known Allergies  Outpatient Encounter Prescriptions as of 11/5/2018   Medication Sig Dispense Refill   • atenolol (TENORMIN) 50 MG Tab Take 0.5 Tabs by mouth every day. 45 Tab 0   • ezetimibe (ZETIA) 10 MG Tab Take 1 Tab by mouth every day. 30 Tab 11   • rosuvastatin (CRESTOR) 10 MG Tab Take 1 Tab by mouth every evening. 30 Tab 11   • folic acid  "(FOLVITE) 1 MG Tab Take 2 Tabs by mouth 2 Times a Day. 120 Tab 11   • losartan (COZAAR) 25 MG Tab Take 25 mg by mouth every day.     • Pyridoxine HCl (VITAMIN B6 PO) Take  by mouth every day.     • Coenzyme Q10 (CO Q-10 PO) Take  by mouth every day.     • cyanocobalamin (VITAMIN B-12) 100 MCG TABS Take 100 mcg by mouth every day.       • aspirin 81 MG tablet Take 81 mg by mouth every day.     • vitamin D, Ergocalciferol, (DRISDOL) 15968 UNITS Cap capsule Take  by mouth every 14 days.       No facility-administered encounter medications on file as of 11/5/2018.      ROS     Objective:   /70 (BP Location: Left arm, Patient Position: Sitting, BP Cuff Size: Adult)   Pulse (!) 48   Ht 1.651 m (5' 5\")   Wt 104.3 kg (230 lb)   SpO2 95%   BMI 38.27 kg/m²     Physical Exam   Constitutional: He appears well-developed and well-nourished.   Neck: No JVD present.   Cardiovascular: Normal rate and regular rhythm.    No murmur heard.  Pulmonary/Chest: Effort normal and breath sounds normal. He has no rales.   Abdominal: Soft. There is no tenderness.   Musculoskeletal: He exhibits edema (Trace pretibial).     Lab Results   Component Value Date/Time    CHOLSTRLTOT 182 10/31/2018 07:59 AM     (H) 10/31/2018 07:59 AM    HDL 36 (A) 10/31/2018 07:59 AM    TRIGLYCERIDE 162 (H) 10/31/2018 07:59 AM       Lab Results   Component Value Date/Time    SODIUM 140 10/31/2018 07:59 AM    POTASSIUM 4.4 10/31/2018 07:59 AM    CHLORIDE 106 10/31/2018 07:59 AM    CO2 25 10/31/2018 07:59 AM    GLUCOSE 117 (H) 10/31/2018 07:59 AM    BUN 22 10/31/2018 07:59 AM    CREATININE 1.43 (H) 10/31/2018 07:59 AM     Lab Results   Component Value Date/Time    ALKPHOSPHAT 116 (H) 10/31/2018 07:59 AM    ASTSGOT 80 (H) 10/31/2018 07:59 AM    ALTSGPT 98 (H) 10/31/2018 07:59 AM    TBILIRUBIN 1.2 10/31/2018 07:59 AM      No results found for: BNPBTYPENAT       Assessment:     1. Coronary artery disease with a history of coronary bypass graft surgery : " CABG x3 in 1998, occluded SVG to OM with patent LIMA to LAD in 2007     2. Dyslipidemia     3. Essential hypertension         Medical Decision Making:  Today's Assessment / Status / Plan:     Mr. Coleman is clinically stable.  However, his LDL has doubled.  At this time, we will try to get him on a PC SK-9 inhibitor.  In addition, I would like him to check his blood pressures about 3 times a week at various times a day and record these for us.  We may want to try for a slightly lower blood pressure given the current guidelines.  He will follow-up in 6 months with repeat lab work.  I do not feel we can increase rosuvastatin given the fact that he already has some increase in his LFTs.

## 2018-11-19 DIAGNOSIS — E78.5 DYSLIPIDEMIA: Primary | ICD-10-CM

## 2018-11-20 ENCOUNTER — TELEPHONE (OUTPATIENT)
Dept: CARDIOLOGY | Facility: MEDICAL CENTER | Age: 58
End: 2018-11-20

## 2018-11-20 NOTE — TELEPHONE ENCOUNTER
PAR sent to patient's plan:  Severo Coleman Key: [id number] - Rx #: [id number]   Status   Sent to [employer]   [employer] 75MG/ML SC SOPN   [employer] Prior Authorization Form (CB)   Original Claim Info76

## 2018-12-17 DIAGNOSIS — I25.119 CORONARY ARTERY DISEASE INVOLVING NATIVE CORONARY ARTERY OF NATIVE HEART WITH ANGINA PECTORIS (HCC): ICD-10-CM

## 2018-12-18 RX ORDER — ATENOLOL 50 MG/1
25 TABLET ORAL DAILY
Qty: 45 TAB | Refills: 3 | Status: SHIPPED | OUTPATIENT
Start: 2018-12-18 | End: 2019-11-07 | Stop reason: SDUPTHER

## 2019-01-02 DIAGNOSIS — E78.5 DYSLIPIDEMIA: ICD-10-CM

## 2019-01-23 ENCOUNTER — HOSPITAL ENCOUNTER (OUTPATIENT)
Dept: LAB | Facility: MEDICAL CENTER | Age: 59
End: 2019-01-23
Attending: UROLOGY
Payer: COMMERCIAL

## 2019-01-23 DIAGNOSIS — E78.5 HYPERLIPIDEMIA, UNSPECIFIED HYPERLIPIDEMIA TYPE: Primary | ICD-10-CM

## 2019-01-23 LAB — PSA SERPL-MCNC: 0.37 NG/ML (ref 0–4)

## 2019-01-23 PROCEDURE — 36415 COLL VENOUS BLD VENIPUNCTURE: CPT

## 2019-01-23 PROCEDURE — 84153 ASSAY OF PSA TOTAL: CPT

## 2019-01-23 RX ORDER — ROSUVASTATIN CALCIUM 10 MG/1
10 TABLET, COATED ORAL EVERY EVENING
Qty: 90 TAB | Refills: 3 | Status: SHIPPED | OUTPATIENT
Start: 2019-01-23 | End: 2020-01-08

## 2019-05-03 ENCOUNTER — HOSPITAL ENCOUNTER (OUTPATIENT)
Dept: LAB | Facility: MEDICAL CENTER | Age: 59
End: 2019-05-03
Attending: INTERNAL MEDICINE
Payer: COMMERCIAL

## 2019-05-03 DIAGNOSIS — I25.10 CORONARY ARTERY DISEASE DUE TO CALCIFIED CORONARY LESION: ICD-10-CM

## 2019-05-03 DIAGNOSIS — E78.5 DYSLIPIDEMIA: ICD-10-CM

## 2019-05-03 DIAGNOSIS — I10 ESSENTIAL HYPERTENSION: ICD-10-CM

## 2019-05-03 DIAGNOSIS — I25.84 CORONARY ARTERY DISEASE DUE TO CALCIFIED CORONARY LESION: ICD-10-CM

## 2019-05-03 LAB
ALBUMIN SERPL BCP-MCNC: 4.2 G/DL (ref 3.2–4.9)
ALBUMIN/GLOB SERPL: 1.8 G/DL
ALP SERPL-CCNC: 112 U/L (ref 30–99)
ALT SERPL-CCNC: 99 U/L (ref 2–50)
ANION GAP SERPL CALC-SCNC: 8 MMOL/L (ref 0–11.9)
AST SERPL-CCNC: 73 U/L (ref 12–45)
BILIRUB SERPL-MCNC: 1.1 MG/DL (ref 0.1–1.5)
BUN SERPL-MCNC: 27 MG/DL (ref 8–22)
CALCIUM SERPL-MCNC: 8.9 MG/DL (ref 8.5–10.5)
CHLORIDE SERPL-SCNC: 107 MMOL/L (ref 96–112)
CHOLEST SERPL-MCNC: 89 MG/DL (ref 100–199)
CO2 SERPL-SCNC: 22 MMOL/L (ref 20–33)
CREAT SERPL-MCNC: 1.23 MG/DL (ref 0.5–1.4)
FASTING STATUS PATIENT QL REPORTED: NORMAL
GLOBULIN SER CALC-MCNC: 2.3 G/DL (ref 1.9–3.5)
GLUCOSE SERPL-MCNC: 114 MG/DL (ref 65–99)
HDLC SERPL-MCNC: 33 MG/DL
LDLC SERPL CALC-MCNC: 28 MG/DL
POTASSIUM SERPL-SCNC: 4.3 MMOL/L (ref 3.6–5.5)
PROT SERPL-MCNC: 6.5 G/DL (ref 6–8.2)
SODIUM SERPL-SCNC: 137 MMOL/L (ref 135–145)
TRIGL SERPL-MCNC: 140 MG/DL (ref 0–149)

## 2019-05-03 PROCEDURE — 80061 LIPID PANEL: CPT

## 2019-05-03 PROCEDURE — 36415 COLL VENOUS BLD VENIPUNCTURE: CPT

## 2019-05-03 PROCEDURE — 80053 COMPREHEN METABOLIC PANEL: CPT

## 2019-05-08 ENCOUNTER — OFFICE VISIT (OUTPATIENT)
Dept: CARDIOLOGY | Facility: MEDICAL CENTER | Age: 59
End: 2019-05-08
Payer: COMMERCIAL

## 2019-05-08 VITALS
WEIGHT: 226 LBS | OXYGEN SATURATION: 93 % | HEART RATE: 58 BPM | DIASTOLIC BLOOD PRESSURE: 88 MMHG | HEIGHT: 65 IN | BODY MASS INDEX: 37.65 KG/M2 | SYSTOLIC BLOOD PRESSURE: 130 MMHG

## 2019-05-08 DIAGNOSIS — I25.84 CORONARY ARTERY DISEASE DUE TO CALCIFIED CORONARY LESION: ICD-10-CM

## 2019-05-08 DIAGNOSIS — I25.10 CORONARY ARTERY DISEASE DUE TO CALCIFIED CORONARY LESION: ICD-10-CM

## 2019-05-08 DIAGNOSIS — I10 ESSENTIAL HYPERTENSION: ICD-10-CM

## 2019-05-08 DIAGNOSIS — E78.5 DYSLIPIDEMIA: ICD-10-CM

## 2019-05-08 PROCEDURE — 99214 OFFICE O/P EST MOD 30 MIN: CPT | Performed by: INTERNAL MEDICINE

## 2019-05-08 RX ORDER — FINASTERIDE 5 MG/1
5 TABLET, FILM COATED ORAL DAILY
COMMUNITY

## 2019-05-08 RX ORDER — LOSARTAN POTASSIUM 50 MG/1
50 TABLET ORAL DAILY
Qty: 30 TAB | Refills: 11 | Status: SHIPPED | OUTPATIENT
Start: 2019-05-08 | End: 2020-05-08

## 2019-05-08 NOTE — PROGRESS NOTES
Chief Complaint   Patient presents with   • Coronary Artery Disease     F/V: 6 MO       Subjective:   Severo Coleman is a 58 y.o. male who presents today for followup of his coronary artery disease with prior coronary artery bypass graft surgery, hypertension and hyperlipidemia.    He brings in a record of his blood pressures.  They range from approximately 125-145/70-80.  In general, his systolic blood pressures are in the 130s.    He is walking regularly without difficulty.  He denies any dyspnea on exertion, PND, orthopnea or edema.  He has had no chest discomfort, palpitations or lightheadedness.    Past Medical History:   Diagnosis Date   • CAD (coronary artery disease)    • Congestive heart failure (HCC)    • Heart valve disease    • High cholesterol    • HTN (hypertension) 6/30/2014   • Hyperlipidemia 5/14/2012   • Myocardial infarct (HCC) 1998   • Obesity 5/14/2012   • Renal disorder     kidney stones   • S/P CABG (coronary artery bypass graft)      Past Surgical History:   Procedure Laterality Date   • MULTIPLE CORONARY ARTERY BYPASS  1998     Family History   Problem Relation Age of Onset   • Heart Attack Mother      Social History     Social History   • Marital status: Single     Spouse name: N/A   • Number of children: N/A   • Years of education: N/A     Occupational History   • Not on file.     Social History Main Topics   • Smoking status: Never Smoker   • Smokeless tobacco: Never Used   • Alcohol use No   • Drug use: No   • Sexual activity: Not on file     Other Topics Concern   • Not on file     Social History Narrative   • No narrative on file     No Known Allergies  Outpatient Encounter Prescriptions as of 5/8/2019   Medication Sig Dispense Refill   • finasteride (PROSCAR) 5 MG Tab Take 5 mg by mouth every day.     • rosuvastatin (CRESTOR) 10 MG Tab Take 1 Tab by mouth every evening. 90 Tab 3   • Alirocumab 75 MG/ML Solution Pen-injector Inject 75 mg as instructed every 14 days. 6 PEN 3   •  "atenolol (TENORMIN) 50 MG Tab Take 0.5 Tabs by mouth every day. 45 Tab 3   • ezetimibe (ZETIA) 10 MG Tab Take 1 Tab by mouth every day. 30 Tab 11   • folic acid (FOLVITE) 1 MG Tab Take 2 Tabs by mouth 2 Times a Day. 120 Tab 11   • losartan (COZAAR) 25 MG Tab Take 25 mg by mouth every day.     • Pyridoxine HCl (VITAMIN B6 PO) Take  by mouth every day.     • Coenzyme Q10 (CO Q-10 PO) Take  by mouth every day.     • cyanocobalamin (VITAMIN B-12) 100 MCG TABS Take 100 mcg by mouth every day.       • aspirin 81 MG tablet Take 81 mg by mouth every day.     • vitamin D, Ergocalciferol, (DRISDOL) 66511 UNITS Cap capsule Take  by mouth every 14 days.       No facility-administered encounter medications on file as of 5/8/2019.      ROS     Objective:   /88 (BP Location: Left arm, Patient Position: Sitting, BP Cuff Size: Adult)   Pulse (!) 58   Ht 1.651 m (5' 5\")   Wt 102.5 kg (226 lb)   SpO2 93%   BMI 37.61 kg/m²     Physical Exam   Constitutional: He appears well-developed and well-nourished.   Neck: No JVD present.   Cardiovascular: Normal rate and regular rhythm.    No murmur heard.  Pulmonary/Chest: Effort normal and breath sounds normal. He has no rales.   Abdominal: Soft. There is no tenderness.   Musculoskeletal: He exhibits no edema.     Lab Results   Component Value Date/Time    CHOLSTRLTOT 89 (L) 05/03/2019 08:16 AM    LDL 28 05/03/2019 08:16 AM    HDL 33 (A) 05/03/2019 08:16 AM    TRIGLYCERIDE 140 05/03/2019 08:16 AM       Lab Results   Component Value Date/Time    SODIUM 137 05/03/2019 08:16 AM    POTASSIUM 4.3 05/03/2019 08:16 AM    CHLORIDE 107 05/03/2019 08:16 AM    CO2 22 05/03/2019 08:16 AM    GLUCOSE 114 (H) 05/03/2019 08:16 AM    BUN 27 (H) 05/03/2019 08:16 AM    CREATININE 1.23 05/03/2019 08:16 AM     Lab Results   Component Value Date/Time    ALKPHOSPHAT 112 (H) 05/03/2019 08:16 AM    ASTSGOT 73 (H) 05/03/2019 08:16 AM    ALTSGPT 99 (H) 05/03/2019 08:16 AM    TBILIRUBIN 1.1 05/03/2019 08:16 AM "      No results found for: BNPBTYPENAT       Assessment:     1. Coronary artery disease due to a calcified coronary lesion: Status post CABG x3 in 1998.  Occluded SVG to OM with a patent LIMA to the LAD in 2007     2. Dyslipidemia     3. Essential hypertension         Medical Decision Making:  Today's Assessment / Status / Plan:     Mr. Coleman is clinically stable.  He ran out of losartan about a month ago.  His blood pressure was still in the 130s when he was taking it.  At this time, we will increase losartan to 50 mg daily.  He will continue to check his blood pressures several times a week.    His lipid status appears to be under excellent control but he would like to reduce his medical therapy.  I feel it is reasonable to discontinue ezetimibe at the present time.  We will recheck his lipid status when he follows up in about 6 months.

## 2019-07-31 ENCOUNTER — HOSPITAL ENCOUNTER (OUTPATIENT)
Dept: LAB | Facility: MEDICAL CENTER | Age: 59
End: 2019-07-31
Attending: FAMILY MEDICINE
Payer: COMMERCIAL

## 2019-07-31 DIAGNOSIS — I25.84 CORONARY ARTERY DISEASE DUE TO CALCIFIED CORONARY LESION: ICD-10-CM

## 2019-07-31 DIAGNOSIS — I25.10 CORONARY ARTERY DISEASE DUE TO CALCIFIED CORONARY LESION: ICD-10-CM

## 2019-07-31 DIAGNOSIS — E78.5 DYSLIPIDEMIA: ICD-10-CM

## 2019-07-31 DIAGNOSIS — I10 ESSENTIAL HYPERTENSION: ICD-10-CM

## 2019-07-31 LAB
BASOPHILS # BLD AUTO: 1 % (ref 0–1.8)
BASOPHILS # BLD: 0.08 K/UL (ref 0–0.12)
CREAT UR-MCNC: 146 MG/DL
EOSINOPHIL # BLD AUTO: 0.19 K/UL (ref 0–0.51)
EOSINOPHIL NFR BLD: 2.3 % (ref 0–6.9)
ERYTHROCYTE [DISTWIDTH] IN BLOOD BY AUTOMATED COUNT: 46.3 FL (ref 35.9–50)
EST. AVERAGE GLUCOSE BLD GHB EST-MCNC: 214 MG/DL
HBA1C MFR BLD: 9.1 % (ref 0–5.6)
HCT VFR BLD AUTO: 60.1 % (ref 42–52)
HGB BLD-MCNC: 19.5 G/DL (ref 14–18)
IMM GRANULOCYTES # BLD AUTO: 0.08 K/UL (ref 0–0.11)
IMM GRANULOCYTES NFR BLD AUTO: 1 % (ref 0–0.9)
LYMPHOCYTES # BLD AUTO: 1.36 K/UL (ref 1–4.8)
LYMPHOCYTES NFR BLD: 16.3 % (ref 22–41)
MCH RBC QN AUTO: 28.2 PG (ref 27–33)
MCHC RBC AUTO-ENTMCNC: 32.4 G/DL (ref 33.7–35.3)
MCV RBC AUTO: 86.8 FL (ref 81.4–97.8)
MICROALBUMIN UR-MCNC: 173.1 MG/DL
MICROALBUMIN/CREAT UR: 1186 MG/G (ref 0–30)
MONOCYTES # BLD AUTO: 0.84 K/UL (ref 0–0.85)
MONOCYTES NFR BLD AUTO: 10 % (ref 0–13.4)
NEUTROPHILS # BLD AUTO: 5.81 K/UL (ref 1.82–7.42)
NEUTROPHILS NFR BLD: 69.4 % (ref 44–72)
NRBC # BLD AUTO: 0 K/UL
NRBC BLD-RTO: 0 /100 WBC
PLATELET # BLD AUTO: 154 K/UL (ref 164–446)
PMV BLD AUTO: 10.1 FL (ref 9–12.9)
RBC # BLD AUTO: 6.92 M/UL (ref 4.7–6.1)
WBC # BLD AUTO: 8.4 K/UL (ref 4.8–10.8)

## 2019-07-31 PROCEDURE — 85025 COMPLETE CBC W/AUTO DIFF WBC: CPT

## 2019-07-31 PROCEDURE — 82043 UR ALBUMIN QUANTITATIVE: CPT

## 2019-07-31 PROCEDURE — 82570 ASSAY OF URINE CREATININE: CPT

## 2019-07-31 PROCEDURE — 36415 COLL VENOUS BLD VENIPUNCTURE: CPT

## 2019-07-31 PROCEDURE — 83036 HEMOGLOBIN GLYCOSYLATED A1C: CPT

## 2019-10-28 ENCOUNTER — TELEPHONE (OUTPATIENT)
Dept: CARDIOLOGY | Facility: MEDICAL CENTER | Age: 59
End: 2019-10-28

## 2019-10-28 ENCOUNTER — HOSPITAL ENCOUNTER (OUTPATIENT)
Dept: LAB | Facility: MEDICAL CENTER | Age: 59
End: 2019-10-28
Attending: INTERNAL MEDICINE
Payer: COMMERCIAL

## 2019-10-28 DIAGNOSIS — I25.119 CORONARY ARTERY DISEASE INVOLVING NATIVE CORONARY ARTERY OF NATIVE HEART WITH ANGINA PECTORIS (HCC): ICD-10-CM

## 2019-10-28 DIAGNOSIS — Z95.1 S/P CABG (CORONARY ARTERY BYPASS GRAFT): ICD-10-CM

## 2019-10-28 DIAGNOSIS — I25.10 CORONARY ARTERY DISEASE DUE TO CALCIFIED CORONARY LESION: ICD-10-CM

## 2019-10-28 DIAGNOSIS — E78.5 DYSLIPIDEMIA: ICD-10-CM

## 2019-10-28 DIAGNOSIS — E78.5 HYPERLIPIDEMIA, UNSPECIFIED HYPERLIPIDEMIA TYPE: ICD-10-CM

## 2019-10-28 DIAGNOSIS — I25.84 CORONARY ARTERY DISEASE DUE TO CALCIFIED CORONARY LESION: ICD-10-CM

## 2019-10-28 DIAGNOSIS — Z79.899 HIGH RISK MEDICATION USE: ICD-10-CM

## 2019-10-28 LAB
ALBUMIN SERPL BCP-MCNC: 4.2 G/DL (ref 3.2–4.9)
ALBUMIN/GLOB SERPL: 1.6 G/DL
ALP SERPL-CCNC: 126 U/L (ref 30–99)
ALT SERPL-CCNC: 84 U/L (ref 2–50)
ANION GAP SERPL CALC-SCNC: 7 MMOL/L (ref 0–11.9)
AST SERPL-CCNC: 67 U/L (ref 12–45)
BILIRUB SERPL-MCNC: 1.4 MG/DL (ref 0.1–1.5)
BUN SERPL-MCNC: 16 MG/DL (ref 8–22)
CALCIUM SERPL-MCNC: 8.7 MG/DL (ref 8.5–10.5)
CHLORIDE SERPL-SCNC: 108 MMOL/L (ref 96–112)
CHOLEST SERPL-MCNC: 133 MG/DL (ref 100–199)
CO2 SERPL-SCNC: 23 MMOL/L (ref 20–33)
CREAT SERPL-MCNC: 1.28 MG/DL (ref 0.5–1.4)
FASTING STATUS PATIENT QL REPORTED: NORMAL
GLOBULIN SER CALC-MCNC: 2.6 G/DL (ref 1.9–3.5)
GLUCOSE SERPL-MCNC: 113 MG/DL (ref 65–99)
HDLC SERPL-MCNC: 31 MG/DL
LDLC SERPL CALC-MCNC: 73 MG/DL
POTASSIUM SERPL-SCNC: 4.4 MMOL/L (ref 3.6–5.5)
PROT SERPL-MCNC: 6.8 G/DL (ref 6–8.2)
SODIUM SERPL-SCNC: 138 MMOL/L (ref 135–145)
TRIGL SERPL-MCNC: 144 MG/DL (ref 0–149)

## 2019-10-28 PROCEDURE — 36415 COLL VENOUS BLD VENIPUNCTURE: CPT

## 2019-10-28 PROCEDURE — 80053 COMPREHEN METABOLIC PANEL: CPT

## 2019-10-28 PROCEDURE — 80061 LIPID PANEL: CPT

## 2019-10-28 NOTE — TELEPHONE ENCOUNTER
FRANKIE Razo at [facility] called and said patient is there for a blood draw for 11/04 appt. There are no orders in Epic. She will draw the blood and wait for new lab order. She said she can only hold the blood for 4 hours. She can be reached at [phone].

## 2019-11-04 ENCOUNTER — OFFICE VISIT (OUTPATIENT)
Dept: CARDIOLOGY | Facility: MEDICAL CENTER | Age: 59
End: 2019-11-04
Payer: COMMERCIAL

## 2019-11-04 VITALS
HEART RATE: 60 BPM | WEIGHT: 221 LBS | BODY MASS INDEX: 36.82 KG/M2 | OXYGEN SATURATION: 93 % | SYSTOLIC BLOOD PRESSURE: 130 MMHG | HEIGHT: 65 IN | DIASTOLIC BLOOD PRESSURE: 84 MMHG

## 2019-11-04 DIAGNOSIS — I10 ESSENTIAL HYPERTENSION: ICD-10-CM

## 2019-11-04 DIAGNOSIS — I25.84 CORONARY ARTERY DISEASE DUE TO CALCIFIED CORONARY LESION: ICD-10-CM

## 2019-11-04 DIAGNOSIS — E78.5 DYSLIPIDEMIA: ICD-10-CM

## 2019-11-04 DIAGNOSIS — I25.10 CORONARY ARTERY DISEASE DUE TO CALCIFIED CORONARY LESION: ICD-10-CM

## 2019-11-04 PROCEDURE — 99214 OFFICE O/P EST MOD 30 MIN: CPT | Performed by: INTERNAL MEDICINE

## 2019-11-04 RX ORDER — POLYETHYLENE GLYCOL 3350, SODIUM CHLORIDE, SODIUM BICARBONATE, POTASSIUM CHLORIDE 420; 11.2; 5.72; 1.48 G/4L; G/4L; G/4L; G/4L
POWDER, FOR SOLUTION ORAL
COMMUNITY
Start: 2019-09-23 | End: 2021-04-22

## 2019-11-04 NOTE — PROGRESS NOTES
Chief Complaint   Patient presents with   • Coronary Artery Disease     F/V:6 MO       Subjective:   Severo Coleman is a 58 y.o. male who presents today for followup of his coronary artery disease with prior coronary artery bypass graft surgery, hypertension and hyperlipidemia.    His blood pressures at home been running approximately 120-130/60-75    He is walking regularly without difficulty.  He denies any dyspnea on exertion, PND, orthopnea or edema.  He has had no chest discomfort, palpitations or lightheadedness.    Past Medical History:   Diagnosis Date   • CAD (coronary artery disease)    • Congestive heart failure (HCC)    • Heart valve disease    • High cholesterol    • HTN (hypertension) 6/30/2014   • Hyperlipidemia 5/14/2012   • Myocardial infarct (HCC) 1998   • Obesity 5/14/2012   • Renal disorder     kidney stones   • S/P CABG (coronary artery bypass graft)      Past Surgical History:   Procedure Laterality Date   • MULTIPLE CORONARY ARTERY BYPASS  1998     Family History   Problem Relation Age of Onset   • Heart Attack Mother      Social History     Socioeconomic History   • Marital status: Single     Spouse name: Not on file   • Number of children: Not on file   • Years of education: Not on file   • Highest education level: Not on file   Occupational History   • Not on file   Social Needs   • Financial resource strain: Not on file   • Food insecurity:     Worry: Not on file     Inability: Not on file   • Transportation needs:     Medical: Not on file     Non-medical: Not on file   Tobacco Use   • Smoking status: Never Smoker   • Smokeless tobacco: Never Used   Substance and Sexual Activity   • Alcohol use: No   • Drug use: No   • Sexual activity: Not on file   Lifestyle   • Physical activity:     Days per week: Not on file     Minutes per session: Not on file   • Stress: Not on file   Relationships   • Social connections:     Talks on phone: Not on file     Gets together: Not on file     Attends  "Adventism service: Not on file     Active member of club or organization: Not on file     Attends meetings of clubs or organizations: Not on file     Relationship status: Not on file   • Intimate partner violence:     Fear of current or ex partner: Not on file     Emotionally abused: Not on file     Physically abused: Not on file     Forced sexual activity: Not on file   Other Topics Concern   • Not on file   Social History Narrative   • Not on file     No Known Allergies  Outpatient Encounter Medications as of 11/4/2019   Medication Sig Dispense Refill   • metFORMIN (GLUCOPHAGE) 500 MG Tab Take 500 mg by mouth every day.     • finasteride (PROSCAR) 5 MG Tab Take 5 mg by mouth every day.     • losartan (COZAAR) 50 MG Tab Take 1 Tab by mouth every day. 30 Tab 11   • rosuvastatin (CRESTOR) 10 MG Tab Take 1 Tab by mouth every evening. 90 Tab 3   • Alirocumab 75 MG/ML Solution Pen-injector Inject 75 mg as instructed every 14 days. 6 PEN 3   • atenolol (TENORMIN) 50 MG Tab Take 0.5 Tabs by mouth every day. 45 Tab 3   • folic acid (FOLVITE) 1 MG Tab Take 2 Tabs by mouth 2 Times a Day. 120 Tab 11   • Pyridoxine HCl (VITAMIN B6 PO) Take  by mouth every day.     • Coenzyme Q10 (CO Q-10 PO) Take  by mouth every day.     • cyanocobalamin (VITAMIN B-12) 100 MCG TABS Take 100 mcg by mouth every day.       • aspirin 81 MG tablet Take 81 mg by mouth every day.     • polyethylene glycol-electrolytes (NULYTELY) 420 GM solution      • vitamin D, Ergocalciferol, (DRISDOL) 35827 UNITS Cap capsule Take  by mouth every 14 days.       No facility-administered encounter medications on file as of 11/4/2019.      ROS       Objective:   /84 (BP Location: Left arm, Patient Position: Sitting, BP Cuff Size: Adult)   Pulse 60   Ht 1.651 m (5' 5\")   Wt 100.2 kg (221 lb)   SpO2 93%   BMI 36.78 kg/m²     Physical Exam   Constitutional: He appears well-developed and well-nourished.   Neck: No JVD present.   Cardiovascular: Normal rate and " regular rhythm.   No murmur heard.  Pulmonary/Chest: Effort normal and breath sounds normal. He has no rales.   Abdominal: Soft. There is no tenderness.   Musculoskeletal:         General: No edema.     Lab Results   Component Value Date/Time    CHOLSTRLTOT 133 10/28/2019 10:40 AM    LDL 73 10/28/2019 10:40 AM    HDL 31 (A) 10/28/2019 10:40 AM    TRIGLYCERIDE 144 10/28/2019 10:40 AM       Lab Results   Component Value Date/Time    SODIUM 138 10/28/2019 10:40 AM    POTASSIUM 4.4 10/28/2019 10:40 AM    CHLORIDE 108 10/28/2019 10:40 AM    CO2 23 10/28/2019 10:40 AM    GLUCOSE 113 (H) 10/28/2019 10:40 AM    BUN 16 10/28/2019 10:40 AM    CREATININE 1.28 10/28/2019 10:40 AM     Lab Results   Component Value Date/Time    ALKPHOSPHAT 126 (H) 10/28/2019 10:40 AM    ASTSGOT 67 (H) 10/28/2019 10:40 AM    ALTSGPT 84 (H) 10/28/2019 10:40 AM    TBILIRUBIN 1.4 10/28/2019 10:40 AM      No results found for: BNPBTYPENAT       Assessment:     1. Coronary artery disease due to a calcified coronary lesion: Status post CABG x3 in 1998.  Occluded SVG to OM with a patent LIMA to the LAD in 2007     2. Dyslipidemia     3. Essential hypertension         Medical Decision Making:  Today's Assessment / Status / Plan:     Mr. Cloeman is clinically stable.  He has had a significant deterioration in his lipid status since we discontinued Zetia.  He is on moderate dose rosuvastatin and Praluent.  He would like to try better dietary compliance for a few months before going back on ezetimibe if necessary.  His blood pressure appears to be under good control.  He will follow-up in about 3 months with repeat lab work.

## 2019-11-25 ENCOUNTER — SLEEP CENTER VISIT (OUTPATIENT)
Dept: SLEEP MEDICINE | Facility: MEDICAL CENTER | Age: 59
End: 2019-11-25
Payer: COMMERCIAL

## 2019-11-25 VITALS
BODY MASS INDEX: 36.32 KG/M2 | OXYGEN SATURATION: 93 % | SYSTOLIC BLOOD PRESSURE: 124 MMHG | WEIGHT: 218 LBS | RESPIRATION RATE: 15 BRPM | HEIGHT: 65 IN | DIASTOLIC BLOOD PRESSURE: 84 MMHG | HEART RATE: 59 BPM

## 2019-11-25 DIAGNOSIS — I10 ESSENTIAL HYPERTENSION: ICD-10-CM

## 2019-11-25 DIAGNOSIS — E66.9 OBESITY (BMI 30-39.9): ICD-10-CM

## 2019-11-25 DIAGNOSIS — G47.33 OSA (OBSTRUCTIVE SLEEP APNEA): ICD-10-CM

## 2019-11-25 DIAGNOSIS — Z95.1 S/P CABG (CORONARY ARTERY BYPASS GRAFT): Chronic | ICD-10-CM

## 2019-11-25 PROCEDURE — 99204 OFFICE O/P NEW MOD 45 MIN: CPT | Performed by: FAMILY MEDICINE

## 2019-11-25 RX ORDER — ZOLPIDEM TARTRATE 5 MG/1
5 TABLET ORAL NIGHTLY PRN
Qty: 2 TAB | Refills: 0 | Status: SHIPPED | OUTPATIENT
Start: 2019-11-25 | End: 2019-11-26

## 2019-11-25 NOTE — PATIENT INSTRUCTIONS
Sleep Apnea  Sleep apnea is a condition in which breathing pauses or becomes shallow during sleep. Episodes of sleep apnea usually last 10 seconds or longer, and they may occur as many as 20 times an hour. Sleep apnea disrupts your sleep and keeps your body from getting the rest that it needs. This condition can increase your risk of certain health problems, including:  · Heart attack.  · Stroke.  · Obesity.  · Diabetes.  · Heart failure.  · Irregular heartbeat.  There are three kinds of sleep apnea:  · Obstructive sleep apnea. This kind is caused by a blocked or collapsed airway.  · Central sleep apnea. This kind happens when the part of the brain that controls breathing does not send the correct signals to the muscles that control breathing.  · Mixed sleep apnea. This is a combination of obstructive and central sleep apnea.  What are the causes?  The most common cause of this condition is a collapsed or blocked airway. An airway can collapse or become blocked if:  · Your throat muscles are abnormally relaxed.  · Your tongue and tonsils are larger than normal.  · You are overweight.  · Your airway is smaller than normal.  What increases the risk?  This condition is more likely to develop in people who:  · Are overweight.  · Smoke.  · Have a smaller than normal airway.  · Are elderly.  · Are male.  · Drink alcohol.  · Take sedatives or tranquilizers.  · Have a family history of sleep apnea.  What are the signs or symptoms?  Symptoms of this condition include:  · Trouble staying asleep.  · Daytime sleepiness and tiredness.  · Irritability.  · Loud snoring.  · Morning headaches.  · Trouble concentrating.  · Forgetfulness.  · Decreased interest in sex.  · Unexplained sleepiness.  · Mood swings.  · Personality changes.  · Feelings of depression.  · Waking up often during the night to urinate.  · Dry mouth.  · Sore throat.  How is this diagnosed?  This condition may be diagnosed with:  · A medical history.  · A  physical exam.  · A series of tests that are done while you are sleeping (sleep study). These tests are usually done in a sleep lab, but they may also be done at home.  How is this treated?  Treatment for this condition aims to restore normal breathing and to ease symptoms during sleep. It may involve managing health issues that can affect breathing, such as high blood pressure or obesity. Treatment may include:  · Sleeping on your side.  · Using a decongestant if you have nasal congestion.  · Avoiding the use of depressants, including alcohol, sedatives, and narcotics.  · Losing weight if you are overweight.  · Making changes to your diet.  · Quitting smoking.  · Using a device to open your airway while you sleep, such as:  ¨ An oral appliance. This is a custom-made mouthpiece that shifts your lower jaw forward.  ¨ A continuous positive airway pressure (CPAP) device. This device delivers oxygen to your airway through a mask.  ¨ A nasal expiratory positive airway pressure (EPAP) device. This device has valves that you put into each nostril.  ¨ A bi-level positive airway pressure (BPAP) device. This device delivers oxygen to your airway through a mask.  · Surgery if other treatments do not work. During surgery, excess tissue is removed to create a wider airway.  It is important to get treatment for sleep apnea. Without treatment, this condition can lead to:  · High blood pressure.  · Coronary artery disease.  · (Men) An inability to achieve or maintain an erection (impotence).  · Reduced thinking abilities.  Follow these instructions at home:  · Make any lifestyle changes that your health care provider recommends.  · Eat a healthy, well-balanced diet.  · Take over-the-counter and prescription medicines only as told by your health care provider.  · Avoid using depressants, including alcohol, sedatives, and narcotics.  · Take steps to lose weight if you are overweight.  · If you were given a device to open your airway  while you sleep, use it only as told by your health care provider.  · Do not use any tobacco products, such as cigarettes, chewing tobacco, and e-cigarettes. If you need help quitting, ask your health care provider.  · Keep all follow-up visits as told by your health care provider. This is important.  Contact a health care provider if:  · The device that you received to open your airway during sleep is uncomfortable or does not seem to be working.  · Your symptoms do not improve.  · Your symptoms get worse.  Get help right away if:  · You develop chest pain.  · You develop shortness of breath.  · You develop discomfort in your back, arms, or stomach.  · You have trouble speaking.  · You have weakness on one side of your body.  · You have drooping in your face.  These symptoms may represent a serious problem that is an emergency. Do not wait to see if the symptoms will go away. Get medical help right away. Call your local emergency services (911 in the [place] Do not drive yourself to the hospital.   This information is not intended to replace advice given to you by your health care provider. Make sure you discuss any questions you have with your health care provider.  Document Released: 12/08/2003 Document Revised: 08/13/2017 Document Reviewed: 09/26/2016  [employer] Interactive Patient Education © 2017 [employer]  Zolpidem tablets  What is this medicine?  ZOLPIDEM (zole PI dem) is used to treat insomnia. This medicine helps you to fall asleep and sleep through the night.  This medicine may be used for other purposes; ask your health care provider or pharmacist if you have questions.  COMMON BRAND NAME(S): Sujata  What should I tell my health care provider before I take this medicine?  They need to know if you have any of these conditions:  -depression  -history of drug abuse or addiction  -if you often drink alcohol  -liver disease  -lung or breathing disease  -myasthenia gravis  -sleep apnea  -suicidal thoughts,  plans, or attempt; a previous suicide attempt by you or a family member  -an unusual or allergic reaction to zolpidem, other medicines, foods, dyes, or preservatives  -pregnant or trying to get pregnant  -breast-feeding  How should I use this medicine?  Take this medicine by mouth with a glass of water. Follow the directions on the prescription label. It is better to take this medicine on an empty stomach and only when you are ready for bed. Do not take your medicine more often than directed. If you have been taking this medicine for several weeks and suddenly stop taking it, you may get unpleasant withdrawal symptoms. Your doctor or health care professional may want to gradually reduce the dose. Do not stop taking this medicine on your own. Always follow your doctor or health care professional's advice.  A special MedGuide will be given to you by the pharmacist with each prescription and refill. Be sure to read this information carefully each time.  Talk to your pediatrician regarding the use of this medicine in children. Special care may be needed.  Overdosage: If you think you have taken too much of this medicine contact a poison control center or emergency room at once.  NOTE: This medicine is only for you. Do not share this medicine with others.  What if I miss a dose?  This does not apply. This medicine should only be taken immediately before going to sleep. Do not take double or extra doses.  What may interact with this medicine?  -alcohol  -antihistamines for allergy, cough and cold  -certain medicines for anxiety or sleep  -certain medicines for depression, like amitriptyline, fluoxetine, sertraline  -certain medicines for fungal infections like ketoconazole and itraconazole  -certain medicines for seizures like phenobarbital, primidone  -ciprofloxacin  -dietary supplements for sleep, like valerian or kava kava  -general anesthetics like halothane, isoflurane, methoxyflurane, propofol  -local anesthetics  "like lidocaine, pramoxine, tetracaine  -medicines that relax muscles for surgery  -narcotic medicines for pain  -phenothiazines like chlorpromazine, mesoridazine, prochlorperazine, thioridazine  -rifampin  This list may not describe all possible interactions. Give your health care provider a list of all the medicines, herbs, non-prescription drugs, or dietary supplements you use. Also tell them if you smoke, drink alcohol, or use illegal drugs. Some items may interact with your medicine.  What should I watch for while using this medicine?  Visit your doctor or health care professional for regular checks on your progress. Keep a regular sleep schedule by going to bed at about the same time each night. Avoid caffeine-containing drinks in the evening hours. When sleep medicines are used every night for more than a few weeks, they may stop working. Talk to your doctor if you still have trouble sleeping.  After taking this medicine for sleep, you may get up out of bed while not being fully awake and do an activity that you do not know you are doing. The next morning, you may have no memory of the event. Activities such as driving a car (\"sleep-driving\"), making and eating food, talking on the phone, sexual activity, and sleep-walking have been reported. Call your doctor right away if you find out you have done any of these activities. Do not take this medicine if you have used alcohol that evening or before bed or taken another medicine for sleep since your risk of doing these sleep-related activities will be increased.  Wait for at least 8 hours after you take a dose before driving or doing other activities that require full mental alertness. Do not take this medicine unless you are able to stay in bed for a full night (7 to 8 hours) before you must be active again. You may have a decrease in mental alertness the day after use, even if you feel that you are fully awake. Tell your doctor if you will need to perform " activities requiring full alertness, such as driving, the next day. Do not stand or sit up quickly after taking this medicine, especially if you are an older patient. This reduces the risk of dizzy or fainting spells.  If you or your family notice any changes in your behavior, such as new or worsening depression, thoughts of harming yourself, anxiety, other unusual or disturbing thoughts, or memory loss, call your doctor right away.  After you stop taking this medicine, you may have trouble falling asleep. This is called rebound insomnia. This problem usually goes away on its own after 1 or 2 nights.  What side effects may I notice from receiving this medicine?  Side effects that you should report to your doctor or health care professional as soon as possible:  -allergic reactions like skin rash, itching or hives, swelling of the face, lips, or tongue  -breathing problems  -changes in vision  -confusion  -depressed mood or other changes in moods or emotions  -feeling faint or lightheaded, falls  -hallucinations  -loss of balance or coordination  -loss of memory  -numbness or tingling of the tongue  -restlessness, excitability, or feelings of anxiety or agitation  -signs and symptoms of liver injury like dark yellow or brown urine; general ill feeling or flu-like symptoms; light-colored stools; loss of appetite; nausea; right upper belly pain; unusually weak or tired; yellowing of the eyes or skin  -suicidal thoughts  -unusual activities while asleep like driving, eating, making phone calls, or sexual activity  Side effects that usually do not require medical attention (report to your doctor or health care professional if they continue or are bothersome):  -dizziness  -drowsiness the day after you take this medicine  -headache  This list may not describe all possible side effects. Call your doctor for medical advice about side effects. You may report side effects to FDA at 0-781-FDA-9048.  Where should I keep my  medicine?  Keep out of the reach of children. This medicine can be abused. Keep your medicine in a safe place to protect it from theft. Do not share this medicine with anyone. Selling or giving away this medicine is dangerous and against the law.  This medicine may cause accidental overdose and death if taken by other adults, children, or pets. Mix any unused medicine with a substance like cat litter or coffee grounds. Then throw the medicine away in a sealed container like a sealed bag or a coffee can with a lid. Do not use the medicine after the expiration date.  Store at room temperature between 20 and 25 degrees C (68 and 77 degrees F).  NOTE: This sheet is a summary. It may not cover all possible information. If you have questions about this medicine, talk to your doctor, pharmacist, or health care provider.  © 2018 [employer] (2017-03-22 14:38:20)

## 2019-11-25 NOTE — PROGRESS NOTES
"     [facility]  Consult Note     Date: 11/25/2019 / Time: 8:31 AM    Patient ID:   Name:             Severo Coleman   YOB: 1960  Age:                 58 y.o.  male   MRN:               [id number]      Thank you for requesting a sleep medicine consultation on Severo Coleman at the sleep center. He presents today with the chief complaints of snoring. He is referred by Dr. Elizondo for evaluation and treatment of sleep disorder breathing. He has hx of CAD, HTN and S/p CABG in 1997.    HISTORY OF PRESENT ILLNESS:       He works the swing shift as a  and sleeps on the couch. Since he doesn't sleep in the bed, he doesn't have a set sleep time. He watches tv all night long and every time he wakes up middle of the night. He gets out of bed at 6 am on weekdays and 7-8 am on the weekends. On average good night, he sleeps for about 5-6 hrs and about 4 hrs on a bad night. Pt has bad nights about 2 nights per week. He wakes up about 2 times per night due to bathroom use and It takes him 30 min to fall back asleep.    He is aware of snoring but denies breathing pauses/gasping or choking in sleep.  He  denies any symptoms of restless legs syndrome such as an \"urge to move\"  He  legs in the evening or bedtime. He  denies any symptoms of narcolepsy such as sleep paralysis or cataplexy, or any symptoms to suggest parasomnias such as sleep walking or acting out of dreams. He  has not used any medications for the sleep problem.    Overall, he does finds his sleep refreshing. W In terms of  excessive daytime sleepiness, he denies of sleepiness while  at work, while reading or watching TV or while driving. Stirum sleepiness scale score is normal at 5/24.He occasionally takes a nap. The naps are usually 30 min long      REVIEW OF SYSTEMS:       Constitutional: Denies fevers, Denies weight changes  Eyes: Denies changes in vision, no eye pain  Ears/Nose/Throat/Mouth: Denies nasal congestion or " sore throat   Cardiovascular: Denies chest pain or palpitations   Respiratory: Denies shortness of breath , Denies cough  Gastrointestinal/Hepatic: Denies abdominal pain, nausea, vomiting, diarrhea, constipation or GI bleeding   Genitourinary: Denies bladder dysfunction, dysuria or frequency  Musculoskeletal/Rheum: Denies  joint pain and swelling   Skin/Breast: Denies rash  Neurological: Denies headache, confusion, memory loss or focal weakness/parasthesias  Psychiatric: denies mood disorder     Comprehensive review of systems form is reviewed with the patient and is attached in the EMR.     PMH:  has a past medical history of CAD (coronary artery disease), Congestive heart failure (HCC), Heart valve disease, High cholesterol, HTN (hypertension) (6/30/2014), Hyperlipidemia (5/14/2012), Kidney stone, Liver disease, Myocardial infarct (HCC) (1998), Obesity (5/14/2012), Renal disorder, and S/P CABG (coronary artery bypass graft).  MEDS:   Current Outpatient Medications:   •  atenolol (TENORMIN) 50 MG Tab, Take 0.5 Tabs by mouth every day., Disp: 45 Tab, Rfl: 0  •  metFORMIN (GLUCOPHAGE) 500 MG Tab, Take 500 mg by mouth every day., Disp: , Rfl:   •  finasteride (PROSCAR) 5 MG Tab, Take 5 mg by mouth every day., Disp: , Rfl:   •  losartan (COZAAR) 50 MG Tab, Take 1 Tab by mouth every day., Disp: 30 Tab, Rfl: 11  •  rosuvastatin (CRESTOR) 10 MG Tab, Take 1 Tab by mouth every evening., Disp: 90 Tab, Rfl: 3  •  Alirocumab 75 MG/ML Solution Pen-injector, Inject 75 mg as instructed every 14 days., Disp: 6 PEN, Rfl: 3  •  folic acid (FOLVITE) 1 MG Tab, Take 2 Tabs by mouth 2 Times a Day., Disp: 120 Tab, Rfl: 11  •  Pyridoxine HCl (VITAMIN B6 PO), Take  by mouth every day., Disp: , Rfl:   •  Coenzyme Q10 (CO Q-10 PO), Take  by mouth every day., Disp: , Rfl:   •  cyanocobalamin (VITAMIN B-12) 100 MCG TABS, Take 100 mcg by mouth every day.  , Disp: , Rfl:   •  aspirin 81 MG tablet, Take 81 mg by mouth every day., Disp: , Rfl:   •  " polyethylene glycol-electrolytes (NULYTELY) 420 GM solution, , Disp: , Rfl:   •  vitamin D, Ergocalciferol, (DRISDOL) 57794 UNITS Cap capsule, Take  by mouth every 14 days., Disp: , Rfl:   ALLERGIES: No Known Allergies  SURGHX:   Past Surgical History:   Procedure Laterality Date   • MULTIPLE CORONARY ARTERY BYPASS  1998     SOCHX:  reports that he has never smoked. He has never used smokeless tobacco. He reports that he does not drink alcohol or use drugs..   FH:   Family History   Problem Relation Age of Onset   • Heart Attack Mother    • Sleep Apnea Brother            Physical Exam:  Vitals/ General Appearance:   Weight/BMI: Body mass index is 36.28 kg/m².  /84 (BP Location: Left arm, Patient Position: Sitting, BP Cuff Size: Adult)   Pulse (!) 59   Resp 15   Ht 1.651 m (5' 5\")   Wt 98.9 kg (218 lb)   SpO2 93%   Vitals:    11/25/19 0814   BP: 124/84   BP Location: Left arm   Patient Position: Sitting   BP Cuff Size: Adult   Pulse: (!) 59   Resp: 15   SpO2: 93%   Weight: 98.9 kg (218 lb)   Height: 1.651 m (5' 5\")       Pt. is alert and oriented to time, place and person. Cooperative and in no apparent distress.       -Head: Atraumatic, normocephalic.   -. Ears: Normal tympanic membrane and no discharge  -. Nose: No inferior turbinate hypertophy, no septal deviation, no polyp.   -. Throat: Oropharynx appears crowded in that the palate is overhanging (Malam Mary scale 4. Tonsils are not enlarged, uvula is large, pharynx not inflamed.   -. Neck: Supple. No thyromegaly  -. Chest: Trachea central  -. Lungs auscultation: B/L good air entry, vesicular breath sounds, no wheezing  -. Heart auscultation: 1st and 2nd heart sounds normal, regular rhythm. No appreciable murmur.  - Extremities: no clubbing, no pedal edema.  - Skin: No rash  - NEUROLOGICAL EXAMINATION: On neurological exam, the patient was alert and oriented x3. speech was clear and fluent without dysarthria.      INVESTIGATIONS:       ASSESSMENT AND " PLAN     1. He  has symptoms of Obstructive Sleep Apnea (DAVID). He  risk factors for DAVID include obesity, thick neck, and crowded oropharynx.     The pathophysiology of DAVID and the increased risk of cardiovascular morbidity from untreated DAVID is discussed in detail with the patient. He  also has HTN, heart disease which can be worsened by her DAVID.     We have discussed diagnostic options including in-laboratory, attended polysomnography and home sleep testing. We have also discussed treatment options including airway pressurization, reconstructive otolaryngologic surgery, dental appliances and weight management.       Subsequently,treatment options will be discussed based on the diagnostic study. Meanwhile, He is urged to avoid supine sleep, weight gain and alcoholic beverages since all of these can worsen DAVID. He is cautioned against drowsy driving. If He feels sleepy while driving, He must pull over for a break/nap, rather than persist on the road, in the interest of He own safety and that of others on the road.    Plan  -  He  will be scheduled for an overnight PSG to assess sleep related  breathing disorder.    2. CAD s/p CABG: He is currently asymptomatic. He is on atenelol, losartan, and asprin. It is managed by cardiology.The last ECHO was on 8/26/13 which showed normal EF of 55-60%.    3. Regarding treatment of other past medical problems and general health maintenance,  He is urged to follow up with PCP.

## 2019-12-10 DIAGNOSIS — E78.5 DYSLIPIDEMIA: ICD-10-CM

## 2019-12-23 ENCOUNTER — SLEEP STUDY (OUTPATIENT)
Dept: SLEEP MEDICINE | Facility: MEDICAL CENTER | Age: 59
End: 2019-12-23
Attending: FAMILY MEDICINE
Payer: COMMERCIAL

## 2019-12-23 DIAGNOSIS — I10 ESSENTIAL HYPERTENSION: ICD-10-CM

## 2019-12-23 DIAGNOSIS — G47.33 OSA (OBSTRUCTIVE SLEEP APNEA): ICD-10-CM

## 2019-12-23 DIAGNOSIS — Z95.1 S/P CABG (CORONARY ARTERY BYPASS GRAFT): Chronic | ICD-10-CM

## 2019-12-23 PROCEDURE — 95811 POLYSOM 6/>YRS CPAP 4/> PARM: CPT | Performed by: FAMILY MEDICINE

## 2019-12-24 NOTE — PROCEDURES
Technical summary: The patient underwent a split-night polysomnogram. This was a 16 channel montage study to include a 6 channel EEG, a 2 channel EOG, and chin EMG, left and right leg EMG, a snore channel, a nasal pressure transducer, and a nasal oral airflow  thermistor and a CFLOW pressure transducer.   Respiratory effort was assessed with the use of a thoracic and abdominal monitor and overnight oximetry was obtained. Audio and video recordings were reviewed. This was a fully attended study and sleep stage scoring was performed. The test was technically adequate.    Scoring Criteria: A modification of the the AASM Manual for the Scoring of Sleep and Associated Events. Obstructive apnea was scored by cessation of airflow for at least 10 seconds with continuing respiratory effort.  Central apnea was scored by cessation of airflow for at least 10 seconds with no effort.  Hypopnea was scored by a 30% or more reduction in airflow for at least 10 seconds accompanied by an arterial oxygen desaturation of 3% or more.  (For Medicare patients, hypopneas were scored by a 30% or more reduction in airflow for at least 10 seconds accompanied by an arterial oxygen saturation of 4% or more, as required by their insurance, CMS.    Interpretation:  Study start time was 08:58:42 PM.  Total recording time was 2h 31.5m (151 minutes) with a total sleep time of 2h 2.5m (122 minutes) resulting in a sleep efficiency of 80.86%.  Sleep latency from the start fo the study was 07 minutes minutes and REM latency from sleep onset was 99 minutes minutes.    Respiratory:   There were 12 apneas in total consisting of 12 obstructive apneas, 0 mixed apneas, and 0 central apneas.  There were 93 hypopneas in total.The apnea index was 5.88 per hour and the hypopnea index was 45.55 per hour.The overall AHI was 51.4, with a REM AHI of 61.71, and a supine AHI of 51.43.    Limb Movements:  There were a total of 213 periodic leg movements, of which 20 were  PLMS arousals.  This resulted in a PLMS index of 104.3 and a PLMS arousal index of 9.8    Oximetry:  The mean SaO2 was 92.0% for the diagnostic portion of the study, with a minimum SaO2 of 71.0%.    Treatment:    Interpretation:  Treatment recording time was 6h 31.5m (391 minutes) with a total sleep time of 5h 38.0m (338 minutes) resulting in a sleep efficiency of 86.3%.  Sleep latency from the start of treatment was 04 minutes minutes and REM latency from sleep onset was 1h 23.5m minutes.  The patient had 83 arousals in total for an arousal index of 14.7.    Respiratory:   There were 0 apneas in total consisting of  0 obstructive apneas, 0 central apneas, and 0 mixed apneas for an apnea index of 0.00.  The patient had 60 hypopneas in total, which resulted in a hypopnea index of 10.65.  The overall AHI was 10.65, with a REM AHI of 28.28, and a supine AHI of 10.65.       Limb Movements:  There were a total of 203 periodic leg movements, of which 15 were PLMS arousals.  This resulted in a PLMS index of 36.0 and a PLMS arousal index of 2.7.    Oximetry:  The mean SaO2 during treatment was 92.0%, with a minimum oxygen saturation of 74.0%.    CPAP was tried from 5 cm H2O to 15 cm H2O.      CPAP Titration:  Due to the significant number of obstructive respiratory events observed during the diagnostic portion of the study a CPAP titration trial was performed during the second half of the night. The CPAP pressure was initiated at 5 cm of water and the pressure was increased in an attempt to eliminate all sleep disordered breathing and snoring. The CPAP pressure was increased to 15 cm water and at this final pressure the patient was observed in the supine REM sleep stage. The apnea hypopnea index improved to 0/hr with improved O2 romelia of  91%.He spent 11 min of sleep time below 89% O2 saturation Snoring was resolved. The CPAP was well-tolerated and there was minimal air leaks. No supplemental oxygen was  required.      Impression:  1.  Severe obstructive sleep apnea with AHI of 51.4/hr and O2 romelia 72 %. Due to severity of the disease he met the split study protocol. The titration started with CPAP 5 cm and the best tolerated was CPAP 15 cm. The AHI improved to 0/hr with improved O2 romelia of 91% and average O2 saturation of 93 %.     2.  PLMS  3.  Bradycardia     Recommendations:  I recommend CPAP 15 cm with medium simplus mask. I also recommend 30 day compliance download to assess the efficacy to the recommended pressure, measure leak, apnea hypopnea index and compliance for further outpatient monitoring and management of CPAP therapy. In some cases alternative treatment options may prove effective in resolving sleep apnea and these options include upper airway surgery, the use of a dental orthotic or weight loss and positional therapy. Clinical correlation is required. In general patients with sleep apnea are advised to avoid alcohol and sedatives and to not operate a motor vehicle while drowsy and are at a greater risk for cardiovascular disease.

## 2020-01-08 DIAGNOSIS — E78.5 HYPERLIPIDEMIA, UNSPECIFIED HYPERLIPIDEMIA TYPE: ICD-10-CM

## 2020-01-10 RX ORDER — ROSUVASTATIN CALCIUM 10 MG/1
TABLET, COATED ORAL
Qty: 90 TAB | Refills: 3 | Status: SHIPPED | OUTPATIENT
Start: 2020-01-10 | End: 2020-07-02

## 2020-01-13 ENCOUNTER — HOSPITAL ENCOUNTER (OUTPATIENT)
Dept: LAB | Facility: MEDICAL CENTER | Age: 60
End: 2020-01-13
Attending: PHYSICIAN ASSISTANT
Payer: COMMERCIAL

## 2020-01-13 PROCEDURE — 84153 ASSAY OF PSA TOTAL: CPT

## 2020-01-13 PROCEDURE — 36415 COLL VENOUS BLD VENIPUNCTURE: CPT

## 2020-01-14 LAB — PSA SERPL-MCNC: 0.33 NG/ML (ref 0–4)

## 2020-02-08 ENCOUNTER — HOSPITAL ENCOUNTER (OUTPATIENT)
Dept: LAB | Facility: MEDICAL CENTER | Age: 60
End: 2020-02-08
Attending: FAMILY MEDICINE
Payer: COMMERCIAL

## 2020-02-08 LAB
ALBUMIN SERPL BCP-MCNC: 4.3 G/DL (ref 3.2–4.9)
ALBUMIN/GLOB SERPL: 1.7 G/DL
ALP SERPL-CCNC: 99 U/L (ref 30–99)
ALT SERPL-CCNC: 64 U/L (ref 2–50)
ANION GAP SERPL CALC-SCNC: 7 MMOL/L (ref 0–11.9)
AST SERPL-CCNC: 48 U/L (ref 12–45)
BILIRUB SERPL-MCNC: 0.4 MG/DL (ref 0.1–1.5)
BUN SERPL-MCNC: 25 MG/DL (ref 8–22)
CALCIUM SERPL-MCNC: 9.3 MG/DL (ref 8.5–10.5)
CHLORIDE SERPL-SCNC: 106 MMOL/L (ref 96–112)
CHOLEST SERPL-MCNC: 127 MG/DL (ref 100–199)
CO2 SERPL-SCNC: 22 MMOL/L (ref 20–33)
CREAT SERPL-MCNC: 1.24 MG/DL (ref 0.5–1.4)
CREAT UR-MCNC: 71.6 MG/DL
EST. AVERAGE GLUCOSE BLD GHB EST-MCNC: 192 MG/DL
FASTING STATUS PATIENT QL REPORTED: NORMAL
GLOBULIN SER CALC-MCNC: 2.5 G/DL (ref 1.9–3.5)
GLUCOSE SERPL-MCNC: 138 MG/DL (ref 65–99)
HBA1C MFR BLD: 8.3 % (ref 0–5.6)
HDLC SERPL-MCNC: 28 MG/DL
LDLC SERPL CALC-MCNC: 24 MG/DL
MICROALBUMIN UR-MCNC: 132.9 MG/DL
MICROALBUMIN/CREAT UR: 1856 MG/G (ref 0–30)
POTASSIUM SERPL-SCNC: 4.3 MMOL/L (ref 3.6–5.5)
PROT SERPL-MCNC: 6.8 G/DL (ref 6–8.2)
SODIUM SERPL-SCNC: 135 MMOL/L (ref 135–145)
TRIGL SERPL-MCNC: 376 MG/DL (ref 0–149)

## 2020-02-08 PROCEDURE — 80053 COMPREHEN METABOLIC PANEL: CPT

## 2020-02-08 PROCEDURE — 80061 LIPID PANEL: CPT

## 2020-02-08 PROCEDURE — 82043 UR ALBUMIN QUANTITATIVE: CPT

## 2020-02-08 PROCEDURE — 36415 COLL VENOUS BLD VENIPUNCTURE: CPT

## 2020-02-08 PROCEDURE — 82570 ASSAY OF URINE CREATININE: CPT

## 2020-02-08 PROCEDURE — 83036 HEMOGLOBIN GLYCOSYLATED A1C: CPT

## 2020-02-11 DIAGNOSIS — I25.119 CORONARY ARTERY DISEASE INVOLVING NATIVE CORONARY ARTERY OF NATIVE HEART WITH ANGINA PECTORIS (HCC): ICD-10-CM

## 2020-02-12 RX ORDER — ATENOLOL 25 MG/1
25 TABLET ORAL DAILY
Qty: 90 TAB | Refills: 3 | Status: SHIPPED | OUTPATIENT
Start: 2020-02-12 | End: 2021-02-08 | Stop reason: SDUPTHER

## 2020-03-16 ENCOUNTER — SLEEP CENTER VISIT (OUTPATIENT)
Dept: SLEEP MEDICINE | Facility: MEDICAL CENTER | Age: 60
End: 2020-03-16
Payer: COMMERCIAL

## 2020-03-16 VITALS
WEIGHT: 211 LBS | OXYGEN SATURATION: 91 % | SYSTOLIC BLOOD PRESSURE: 128 MMHG | HEART RATE: 60 BPM | DIASTOLIC BLOOD PRESSURE: 64 MMHG | HEIGHT: 65 IN | BODY MASS INDEX: 35.16 KG/M2

## 2020-03-16 DIAGNOSIS — G47.33 OSA (OBSTRUCTIVE SLEEP APNEA): ICD-10-CM

## 2020-03-16 DIAGNOSIS — E66.9 OBESITY (BMI 30-39.9): ICD-10-CM

## 2020-03-16 PROCEDURE — 99214 OFFICE O/P EST MOD 30 MIN: CPT | Performed by: NURSE PRACTITIONER

## 2020-03-16 ASSESSMENT — FIBROSIS 4 INDEX: FIB4 SCORE: 2.26

## 2020-03-16 NOTE — PATIENT INSTRUCTIONS
1) Start autoCPAP at [id number]  2) Clean mask and supplies weekly and change them as insurance allows  3) Light conditioning and dietary changes encouraged  4) Vaccines: Recommended  5) Return in about 6 weeks (around 4/27/2020) for follow up with DESMOND Meyers, if not sooner, Compliance.

## 2020-03-16 NOTE — PROGRESS NOTES
CC:  Here for f/u sleep issues as listed below    HPI:   Severo presents today for follow up obstructive sleep apnea.  PMH of CABG x 3, familial heart disease, HTN, dyslipidemia.       PSG from 12/2019 indicated an AHI of 51.4 that increases to 61.7 in REM and low oxygenation of 71%. There were a total of 213 periodic leg movements, of which 20 were PLMS arousals.  This resulted in a PLMS index of 104.3 and a PLMS arousal index of 9.8 CPAP was tried from 5 cm H2O to 15 cm H2O. he best tolerated pressure of 15 with an improved AHI of 0, mean oxygenation 93%.     Reviewed results and treatment options including CPAP treatment versus in lab titration, dental appliance, UPPP surgery, and behavioral modifications.  Patient is amendable to CPAP. They understand they may need a future sleep study if treatment is ineffective.     Patient is currently sleeping 5-6 hours per night with 2-3 nighttime awakenings. They have trouble falling asleep.   They typically feel refreshed in the morning and denies morning H/A. They feel tired throughout the day and naps for appx 30 min long every day.  Patient reports snoring, apnea events and paroxysmal nocturnal dyspnea events. They have never fallen asleep in conversation, at the wheel, or at work.  They deny sleepwalking. Deny symptoms of restless leg.  BMI is 35. Treadmill 3-4x/week for 30 minutes.        Patient Active Problem List    Diagnosis Date Noted   • DAVID (obstructive sleep apnea) 03/16/2020   • Obesity (BMI 30-39.9) 11/25/2019   • Essential hypertension 06/30/2014   • S/P CABG (coronary artery bypass graft)    • Coronary artery disease due to a calcified coronary lesion: Status post CABG x3 in 1998.  Occluded SVG to OM with a patent LIMA to the LAD in 2007 05/14/2012   • Obesity 05/14/2012   • Dyslipidemia 05/14/2012       Past Medical History:   Diagnosis Date   • CAD (coronary artery disease)    • Congestive heart failure (HCC)    • Heart valve disease    • High cholesterol     • HTN (hypertension) 6/30/2014   • Hyperlipidemia 5/14/2012   • Kidney stone    • Liver disease    • Myocardial infarct (HCC) 1998   • Obesity 5/14/2012   • Renal disorder     kidney stones   • S/P CABG (coronary artery bypass graft)        Past Surgical History:   Procedure Laterality Date   • MULTIPLE CORONARY ARTERY BYPASS  1998       Family History   Problem Relation Age of Onset   • Heart Attack Mother    • Sleep Apnea Brother        Social History     Socioeconomic History   • Marital status: Single     Spouse name: Not on file   • Number of children: Not on file   • Years of education: Not on file   • Highest education level: Not on file   Occupational History   • Not on file   Social Needs   • Financial resource strain: Not on file   • Food insecurity     Worry: Not on file     Inability: Not on file   • Transportation needs     Medical: Not on file     Non-medical: Not on file   Tobacco Use   • Smoking status: Never Smoker   • Smokeless tobacco: Never Used   Substance and Sexual Activity   • Alcohol use: No   • Drug use: No   • Sexual activity: Not on file   Lifestyle   • Physical activity     Days per week: Not on file     Minutes per session: Not on file   • Stress: Not on file   Relationships   • Social connections     Talks on phone: Not on file     Gets together: Not on file     Attends Anabaptist service: Not on file     Active member of club or organization: Not on file     Attends meetings of clubs or organizations: Not on file     Relationship status: Not on file   • Intimate partner violence     Fear of current or ex partner: Not on file     Emotionally abused: Not on file     Physically abused: Not on file     Forced sexual activity: Not on file   Other Topics Concern   • Not on file   Social History Narrative   • Not on file       Current Outpatient Medications   Medication Sig Dispense Refill   • atenolol (TENORMIN) 25 MG Tab Take 1 Tab by mouth every day. 90 Tab 3   • Alirocumab 75 MG/ML  "Solution Pen-injector Inject 75 mg as instructed every 14 days. 6 PEN 3   • metFORMIN (GLUCOPHAGE) 500 MG Tab Take 500 mg by mouth every day.     • polyethylene glycol-electrolytes (NULYTELY) 420 GM solution      • finasteride (PROSCAR) 5 MG Tab Take 5 mg by mouth every day.     • losartan (COZAAR) 50 MG Tab Take 1 Tab by mouth every day. 30 Tab 11   • folic acid (FOLVITE) 1 MG Tab Take 2 Tabs by mouth 2 Times a Day. 120 Tab 11   • Pyridoxine HCl (VITAMIN B6 PO) Take  by mouth every day.     • Coenzyme Q10 (CO Q-10 PO) Take  by mouth every day.     • cyanocobalamin (VITAMIN B-12) 100 MCG TABS Take 100 mcg by mouth every day.       • aspirin 81 MG tablet Take 81 mg by mouth every day.     • rosuvastatin (CRESTOR) 10 MG Tab TAKE ONE TABLET BY MOUTH EVERY EVENING (Patient not taking: Reported on 3/16/2020) 90 Tab 3   • vitamin D, Ergocalciferol, (DRISDOL) 74205 UNITS Cap capsule Take  by mouth every 14 days.       No current facility-administered medications for this visit.           Allergies: Patient has no known allergies.      ROS   Gen: Denies fever, chills, unintentional weight loss, fatigue  Resp:Denies Dyspnea  CV: Denies chest pain, chest tightness  Sleep:Denies morning headache, insomnia, gasping for air, apnea  Neuro: Denies frequent headaches, weakness, dizziness  See HPI.  All other systems reviewed and negative        Vital signs for this encounter:  Vitals:    03/16/20 1338   Height: 1.651 m (5' 5\")   Weight: 95.7 kg (211 lb)   Weight % change since last entry.: 0 %   BP: 128/64   Pulse: 60   BMI (Calculated): 35.11                   Physical Exam:   Gen:         Alert and oriented, No apparent distress.   Neck:        No Lymphadenopathy.  Lungs:     Clear to auscultation bilaterally.    CV:          Regular rate and rhythm. No murmurs, rubs or gallops.   Abd:         Soft non tender, non distended.            Ext:          No clubbing, cyanosis, edema.    Assessment   1. Obesity (BMI 30-39.9)  OBESITY " COUNSELING (No Charge): Patient identified as having weight management issue.  Appropriate orders and counseling given.   2. DAVID (obstructive sleep apnea)  DME CPAP       Patient is clinically stable and will proceed with following plan.  Face-to-face time greater than 50% of 25 minutes reviewing:  Reviewing sleep study results, reviewed pathophysiology untreated DAVID including cardiac or neurologic risk factors, weight management, expectations of starting on the machine.      PLAN:   Patient Instructions   1) Start autoCPAP at [id number]  2) Clean mask and supplies weekly and change them as insurance allows  3) Light conditioning and dietary changes encouraged  4) Vaccines: Recommended  5) Return in about 6 weeks (around 4/27/2020) for follow up with DESMOND Meyers, if not sooner, Compliance.

## 2020-05-08 DIAGNOSIS — I10 ESSENTIAL HYPERTENSION: ICD-10-CM

## 2020-05-08 RX ORDER — LOSARTAN POTASSIUM 50 MG/1
TABLET ORAL
Qty: 90 TAB | Refills: 1 | Status: SHIPPED | OUTPATIENT
Start: 2020-05-08 | End: 2020-11-10

## 2020-05-12 ENCOUNTER — HOSPITAL ENCOUNTER (OUTPATIENT)
Dept: LAB | Facility: MEDICAL CENTER | Age: 60
End: 2020-05-12
Attending: FAMILY MEDICINE
Payer: COMMERCIAL

## 2020-05-12 LAB
25(OH)D3 SERPL-MCNC: 16 NG/ML (ref 30–100)
ALBUMIN SERPL BCP-MCNC: 4.4 G/DL (ref 3.2–4.9)
ALBUMIN/GLOB SERPL: 1.4 G/DL
ALP SERPL-CCNC: 120 U/L (ref 30–99)
ALT SERPL-CCNC: 115 U/L (ref 2–50)
ANION GAP SERPL CALC-SCNC: 12 MMOL/L (ref 7–16)
AST SERPL-CCNC: 88 U/L (ref 12–45)
BASOPHILS # BLD AUTO: 0.8 % (ref 0–1.8)
BASOPHILS # BLD: 0.06 K/UL (ref 0–0.12)
BILIRUB SERPL-MCNC: 0.7 MG/DL (ref 0.1–1.5)
BUN SERPL-MCNC: 25 MG/DL (ref 8–22)
CALCIUM SERPL-MCNC: 10.3 MG/DL (ref 8.5–10.5)
CHLORIDE SERPL-SCNC: 101 MMOL/L (ref 96–112)
CHOLEST SERPL-MCNC: 168 MG/DL (ref 100–199)
CO2 SERPL-SCNC: 24 MMOL/L (ref 20–33)
CREAT SERPL-MCNC: 1.19 MG/DL (ref 0.5–1.4)
CREAT UR-MCNC: 110.91 MG/DL
EOSINOPHIL # BLD AUTO: 0.18 K/UL (ref 0–0.51)
EOSINOPHIL NFR BLD: 2.4 % (ref 0–6.9)
ERYTHROCYTE [DISTWIDTH] IN BLOOD BY AUTOMATED COUNT: 47.6 FL (ref 35.9–50)
EST. AVERAGE GLUCOSE BLD GHB EST-MCNC: 217 MG/DL
FASTING STATUS PATIENT QL REPORTED: NORMAL
GLOBULIN SER CALC-MCNC: 3.2 G/DL (ref 1.9–3.5)
GLUCOSE SERPL-MCNC: 191 MG/DL (ref 65–99)
HBA1C MFR BLD: 9.2 % (ref 0–5.6)
HCT VFR BLD AUTO: 62.1 % (ref 42–52)
HDLC SERPL-MCNC: 32 MG/DL
HGB BLD-MCNC: 19.8 G/DL (ref 14–18)
IMM GRANULOCYTES # BLD AUTO: 0.14 K/UL (ref 0–0.11)
IMM GRANULOCYTES NFR BLD AUTO: 1.9 % (ref 0–0.9)
LDLC SERPL CALC-MCNC: 79 MG/DL
LYMPHOCYTES # BLD AUTO: 1.49 K/UL (ref 1–4.8)
LYMPHOCYTES NFR BLD: 20.1 % (ref 22–41)
MCH RBC QN AUTO: 28 PG (ref 27–33)
MCHC RBC AUTO-ENTMCNC: 31.9 G/DL (ref 33.7–35.3)
MCV RBC AUTO: 87.7 FL (ref 81.4–97.8)
MICROALBUMIN UR-MCNC: 209.6 MG/DL
MICROALBUMIN/CREAT UR: 1890 MG/G (ref 0–30)
MONOCYTES # BLD AUTO: 0.69 K/UL (ref 0–0.85)
MONOCYTES NFR BLD AUTO: 9.3 % (ref 0–13.4)
NEUTROPHILS # BLD AUTO: 4.86 K/UL (ref 1.82–7.42)
NEUTROPHILS NFR BLD: 65.5 % (ref 44–72)
NRBC # BLD AUTO: 0 K/UL
NRBC BLD-RTO: 0 /100 WBC
PLATELET # BLD AUTO: 141 K/UL (ref 164–446)
PMV BLD AUTO: 10.6 FL (ref 9–12.9)
POTASSIUM SERPL-SCNC: 4.6 MMOL/L (ref 3.6–5.5)
PROT SERPL-MCNC: 7.6 G/DL (ref 6–8.2)
RBC # BLD AUTO: 7.08 M/UL (ref 4.7–6.1)
SODIUM SERPL-SCNC: 137 MMOL/L (ref 135–145)
TRIGL SERPL-MCNC: 285 MG/DL (ref 0–149)
TSH SERPL DL<=0.005 MIU/L-ACNC: 2.34 UIU/ML (ref 0.38–5.33)
WBC # BLD AUTO: 7.4 K/UL (ref 4.8–10.8)

## 2020-05-12 PROCEDURE — 85025 COMPLETE CBC W/AUTO DIFF WBC: CPT

## 2020-05-12 PROCEDURE — 80061 LIPID PANEL: CPT

## 2020-05-12 PROCEDURE — 82306 VITAMIN D 25 HYDROXY: CPT

## 2020-05-12 PROCEDURE — 80053 COMPREHEN METABOLIC PANEL: CPT

## 2020-05-12 PROCEDURE — 83036 HEMOGLOBIN GLYCOSYLATED A1C: CPT

## 2020-05-12 PROCEDURE — 84443 ASSAY THYROID STIM HORMONE: CPT

## 2020-05-12 PROCEDURE — 82043 UR ALBUMIN QUANTITATIVE: CPT

## 2020-05-12 PROCEDURE — 36415 COLL VENOUS BLD VENIPUNCTURE: CPT

## 2020-05-12 PROCEDURE — 82570 ASSAY OF URINE CREATININE: CPT

## 2020-05-21 ENCOUNTER — HOSPITAL ENCOUNTER (OUTPATIENT)
Facility: MEDICAL CENTER | Age: 60
End: 2020-05-21
Payer: COMMERCIAL

## 2020-05-28 LAB
SARS-COV-2 RNA SPEC QL NAA+PROBE: NOT DETECTED
SPECIMEN SOURCE: NORMAL

## 2020-06-17 ENCOUNTER — HOSPITAL ENCOUNTER (OUTPATIENT)
Dept: LAB | Facility: MEDICAL CENTER | Age: 60
End: 2020-06-17
Attending: FAMILY MEDICINE
Payer: COMMERCIAL

## 2020-06-17 PROCEDURE — 80053 COMPREHEN METABOLIC PANEL: CPT

## 2020-06-17 PROCEDURE — 36415 COLL VENOUS BLD VENIPUNCTURE: CPT

## 2020-06-18 LAB
ALBUMIN SERPL BCP-MCNC: 4.3 G/DL (ref 3.2–4.9)
ALBUMIN/GLOB SERPL: 1.6 G/DL
ALP SERPL-CCNC: 123 U/L (ref 30–99)
ALT SERPL-CCNC: 125 U/L (ref 2–50)
ANION GAP SERPL CALC-SCNC: 10 MMOL/L (ref 7–16)
AST SERPL-CCNC: 96 U/L (ref 12–45)
BILIRUB SERPL-MCNC: 0.8 MG/DL (ref 0.1–1.5)
BUN SERPL-MCNC: 21 MG/DL (ref 8–22)
CALCIUM SERPL-MCNC: 9.5 MG/DL (ref 8.5–10.5)
CHLORIDE SERPL-SCNC: 101 MMOL/L (ref 96–112)
CO2 SERPL-SCNC: 22 MMOL/L (ref 20–33)
CREAT SERPL-MCNC: 1.3 MG/DL (ref 0.5–1.4)
FASTING STATUS PATIENT QL REPORTED: NORMAL
GLOBULIN SER CALC-MCNC: 2.7 G/DL (ref 1.9–3.5)
GLUCOSE SERPL-MCNC: 204 MG/DL (ref 65–99)
POTASSIUM SERPL-SCNC: 4.1 MMOL/L (ref 3.6–5.5)
PROT SERPL-MCNC: 7 G/DL (ref 6–8.2)
SODIUM SERPL-SCNC: 133 MMOL/L (ref 135–145)

## 2020-07-02 ENCOUNTER — OFFICE VISIT (OUTPATIENT)
Dept: CARDIOLOGY | Facility: MEDICAL CENTER | Age: 60
End: 2020-07-02
Payer: COMMERCIAL

## 2020-07-02 VITALS
HEIGHT: 65 IN | SYSTOLIC BLOOD PRESSURE: 136 MMHG | DIASTOLIC BLOOD PRESSURE: 86 MMHG | WEIGHT: 215 LBS | OXYGEN SATURATION: 95 % | BODY MASS INDEX: 35.82 KG/M2 | HEART RATE: 71 BPM

## 2020-07-02 DIAGNOSIS — G47.33 OSA (OBSTRUCTIVE SLEEP APNEA): ICD-10-CM

## 2020-07-02 DIAGNOSIS — I25.10 CORONARY ARTERY DISEASE DUE TO CALCIFIED CORONARY LESION: ICD-10-CM

## 2020-07-02 DIAGNOSIS — I25.84 CORONARY ARTERY DISEASE DUE TO CALCIFIED CORONARY LESION: ICD-10-CM

## 2020-07-02 DIAGNOSIS — K75.81 STEATOHEPATITIS: ICD-10-CM

## 2020-07-02 DIAGNOSIS — E78.5 DYSLIPIDEMIA: ICD-10-CM

## 2020-07-02 DIAGNOSIS — E11.9 TYPE 2 DIABETES MELLITUS WITHOUT COMPLICATION, WITHOUT LONG-TERM CURRENT USE OF INSULIN (HCC): ICD-10-CM

## 2020-07-02 DIAGNOSIS — I10 ESSENTIAL HYPERTENSION: ICD-10-CM

## 2020-07-02 DIAGNOSIS — E66.9 OBESITY (BMI 30-39.9): ICD-10-CM

## 2020-07-02 LAB — EKG IMPRESSION: NORMAL

## 2020-07-02 PROCEDURE — 99214 OFFICE O/P EST MOD 30 MIN: CPT | Performed by: INTERNAL MEDICINE

## 2020-07-02 PROCEDURE — 93000 ELECTROCARDIOGRAM COMPLETE: CPT | Performed by: INTERNAL MEDICINE

## 2020-07-02 RX ORDER — ORAL SEMAGLUTIDE 7 MG/1
TABLET ORAL
COMMUNITY
End: 2022-06-02

## 2020-07-02 ASSESSMENT — FIBROSIS 4 INDEX: FIB4 SCORE: 3.53

## 2020-07-02 NOTE — PROGRESS NOTES
Cardiology Follow-up Consultation Note    Date of note:    7/2/2020    Primary Care Provider: Jacey Elizondo M.D.  Referring Provider: Tez Tena M.D.     Patient Name: Severo Coleman   YOB: 1960  MRN:              [id number]    Chief Complaint: CAD    History of Present Illness: Severo Coleman is a 59 y.o. male whose current medical problems include DAVID on CPAP, CAD s/p CABG, hypertension, dyslipidemia who is here for follow-up.    Last seen by Dr. Tez Tena on 11/4/2019.    Interim Events:  In terms of CAD, no angina. Does 20-30 minutes on the treadmill around 3 times a week.     In terms of hypertension, BP at home in the 120s.     Liver enzymes elevated for years, relatively stable.     In terms of DAVID, on CPAP.     Started praluent 1 year ago.     ROS  Constitution: Negative for chills, fever and night sweats.   HENT: Negative for nosebleeds.    Eyes: Negative for vision loss in left eye and vision loss in right eye.   Respiratory: Negative for hemoptysis.    Gastrointestinal: Negative for hematemesis, hematochezia and melena.   Genitourinary: Negative for hematuria.   Neurological: Negative for focal weakness, numbness and paresthesias.       Past Medical History:   Diagnosis Date   • CAD (coronary artery disease)    • Congestive heart failure (HCC)    • Heart valve disease    • High cholesterol    • HTN (hypertension) 6/30/2014   • Hyperlipidemia 5/14/2012   • Kidney stone    • Liver disease    • Myocardial infarct (HCC) 1998   • Obesity 5/14/2012   • Renal disorder     kidney stones   • S/P CABG (coronary artery bypass graft) 1998    3 vessel         Past Surgical History:   Procedure Laterality Date   • CARDIAC CATH, LEFT HEART  2007    open LIMA to LAD and SVG to Diag which was supplying RCA and LCx by collaterals.    • MULTIPLE CORONARY ARTERY BYPASS  1998    3 vessel, per previous cardiology notes (unable to find records of op report), LIMA to LAD, SVG to  inferior marginal (occluded), and SVG to Diagonal artery         Current Outpatient Medications   Medication Sig Dispense Refill   • Semaglutide (RYBELSUS) 7 MG Tab Take  by mouth.     • losartan (COZAAR) 50 MG Tab TAKE ONE TABLET BY MOUTH EVERY DAY 90 Tab 1   • atenolol (TENORMIN) 25 MG Tab Take 1 Tab by mouth every day. 90 Tab 3   • Alirocumab 75 MG/ML Solution Pen-injector Inject 75 mg as instructed every 14 days. 6 PEN 3   • finasteride (PROSCAR) 5 MG Tab Take 5 mg by mouth every day.     • folic acid (FOLVITE) 1 MG Tab Take 2 Tabs by mouth 2 Times a Day. 120 Tab 11   • vitamin D, Ergocalciferol, (DRISDOL) 73932 UNITS Cap capsule Take  by mouth every 14 days.     • Pyridoxine HCl (VITAMIN B6 PO) Take  by mouth every day.     • Coenzyme Q10 (CO Q-10 PO) Take  by mouth every day.     • cyanocobalamin (VITAMIN B-12) 100 MCG TABS Take 100 mcg by mouth every day.       • aspirin 81 MG tablet Take 81 mg by mouth every day.     • polyethylene glycol-electrolytes (NULYTELY) 420 GM solution        No current facility-administered medications for this visit.          No Known Allergies      Family History   Problem Relation Age of Onset   • Heart Attack Mother    • Sleep Apnea Brother          Social History     Socioeconomic History   • Marital status: Single     Spouse name: Not on file   • Number of children: Not on file   • Years of education: Not on file   • Highest education level: Not on file   Occupational History   • Not on file   Social Needs   • Financial resource strain: Not on file   • Food insecurity     Worry: Not on file     Inability: Not on file   • Transportation needs     Medical: Not on file     Non-medical: Not on file   Tobacco Use   • Smoking status: Never Smoker   • Smokeless tobacco: Never Used   Substance and Sexual Activity   • Alcohol use: No   • Drug use: No   • Sexual activity: Not on file   Lifestyle   • Physical activity     Days per week: Not on file     Minutes per session: Not on file  "  • Stress: Not on file   Relationships   • Social connections     Talks on phone: Not on file     Gets together: Not on file     Attends Jainism service: Not on file     Active member of club or organization: Not on file     Attends meetings of clubs or organizations: Not on file     Relationship status: Not on file   • Intimate partner violence     Fear of current or ex partner: Not on file     Emotionally abused: Not on file     Physically abused: Not on file     Forced sexual activity: Not on file   Other Topics Concern   • Not on file   Social History Narrative    Works at [employer].          Physical Exam:  Ambulatory Vitals  /86 (BP Location: Left arm, Patient Position: Sitting, BP Cuff Size: Adult)   Pulse 71   Ht 1.651 m (5' 5\")   Wt 97.5 kg (215 lb)   SpO2 95%    Oxygen Therapy:  Pulse Oximetry: 95 %  BP Readings from Last 4 Encounters:   07/02/20 136/86   03/16/20 128/64   11/25/19 124/84   11/04/19 130/84       Weight/BMI: Body mass index is 35.78 kg/m².  Wt Readings from Last 4 Encounters:   07/02/20 97.5 kg (215 lb)   03/16/20 95.7 kg (211 lb)   11/25/19 98.9 kg (218 lb)   11/04/19 100.2 kg (221 lb)     General: No apparent distress  Eyes: nl conjunctiva  ENT: OP covered by mask, normal external appearance of nose and ears  Neck: JVP <8 cm H2O, no carotid bruits  Lungs: normal respiratory effort, CTAB  Heart: RRR, no murmurs, no rubs or gallops, trace edema bilateral lower extremities. No LV/RV heave on cardiac palpatation. 2+ bilateral radial pulses.  2+ bilateral dp pulses.   Abdomen: soft, non tender, non distended, no masses, normal bowel sounds.  No HSM.  Extremities/MSK: no clubbing, no cyanosis  Neurological: No focal sensory deficits  Psychiatric: Appropriate affect, A/O x 3, intact judgement and insight  Skin: Warm extremities    Lab Data Review:  Lab Results   Component Value Date/Time    CHOLSTRLTOT 168 05/12/2020 08:08 AM    LDL 79 05/12/2020 08:08 AM    HDL 32 (A) 05/12/2020 " 08:08 AM    TRIGLYCERIDE 285 (H) 05/12/2020 08:08 AM       Lab Results   Component Value Date/Time    SODIUM 133 (L) 06/17/2020 06:32 AM    POTASSIUM 4.1 06/17/2020 06:32 AM    CHLORIDE 101 06/17/2020 06:32 AM    CO2 22 06/17/2020 06:32 AM    GLUCOSE 204 (H) 06/17/2020 06:32 AM    BUN 21 06/17/2020 06:32 AM    CREATININE 1.30 06/17/2020 06:32 AM     Lab Results   Component Value Date/Time    ALKPHOSPHAT 123 (H) 06/17/2020 06:32 AM    ASTSGOT 96 (H) 06/17/2020 06:32 AM    ALTSGPT 125 (H) 06/17/2020 06:32 AM    TBILIRUBIN 0.8 06/17/2020 06:32 AM      Lab Results   Component Value Date/Time    WBC 7.4 05/12/2020 08:08 AM     No components found for: HBGA1C  No components found for: TROPONIN  No components found for: BNP      Cardiac Imaging and Procedures Review:    EKG dated 7/2/2020 : My personal interpretation is sinus bradycardia, previously large anteroseptal-lateral infarct, LAD, TOMAS, LVH. Posterior axis.     Echo dated 2013:   CONCLUSIONS   Normal left ventricular systolic function.   Mild to moderate concentric left ventricular hypertrophy.   Left ventricular ejection fraction is 55% to 60%.   No significant valve abnormalities.     Radiology test Review:  CXR: 2015  FINDINGS:  The patient is status post CABG. The cardiac silhouette is enlarged.  No pulmonary infiltrates or consolidations are noted.  No pleural effusions are appreciated.    Liver ultrasound 5/2/2016:  IMPRESSION:     1.  Cholelithiasis.  2.  Increased liver echogenicity consistent with hepatic steatosis.  3.  8 mm cyst in left lobe of liver.  4.  3.2 cm cyst in the right kidney.    Liver Biopsy 9/12/2016:  A. Liver Biopsy:          Benign liver tissue showing moderate fatty change (approximately           35% of hepatocytes show steatosis).          The lobules also show mild lobular chronic inflammation, slight           acute inflammation and rare degenerated hepatocytes.          The portal tracts show mild chronic inflammation, minimal  acute           inflammation and rare eosinophils; the bile ducts are           unremarkable except for one duct which shows focal acute           inflammation of its wall.          The trichrome and reticulin stains do not show significant           fibrosis or architectural distortion.          The iron stain does not demonstrate increased iron deposition     Comment: The findings fit for steatohepatitis without significant   fibrosis.  Drugs and toxins are possible causes.  Clinical correlation   is suggested.  Clinical information from Dr. Hou's office has also   been reviewed in conjunction with the biopsy.       Medical Decision Makin. Coronary artery disease due to a calcified coronary lesion: Status post CABG x3 in .  Occluded SVG to OM with a patent LIMA to the LAD in   Per notes, appears LIMA and SVG to diag are open, however other notes say only LIMA is open. Currently asymptomatic, however EKG showed quite extensive anterolateral infarct which we will assess with an echocardiogram  -echo, if LVEF decreased, may need to assess with stress testing.   -continue aspirin 81mg PO Daily  -continue praluent, recheck lipid panel with next labs and if not at goal, will increase dose to 150mg Q 2 weeks.   -continue atenolol, will consider switching to metoprolol in the future.       2. Dyslipidemia  Continue praluent as per above.   -f/u liver function, there is concern his elevated LFTs are potentially drug induced, however they have been elevated for at least 5 years and he started the praluent 1 year ago. Might need to stop if enzymes rise rapidly.     3. Essential hypertension  Well controlled at home  -continue atenolol, losartan.     4. Obesity (BMI 30-39.9)  Discuss weight management at future visits. Increase exercise.     5. DAVID (obstructive sleep apnea)  Just started  -continue.    6. Steatohepatitis  Concern for obesity related vs drug induced based on biopsy. Denies alcohol use. LFTs  now worsening  -repeat in 1 month. Request PCP's records.   -might need to trial stopping praluent, will follow.     7. Type 2 diabetes mellitus without complication, without long-term current use of insulin (HCC)  Per PCP, poorly controlled currently.   -f/u       Return in about 6 months (around 1/2/2021).      Ismael Magana MD, [facility], [address]  [address] [address]  Phone: [phone]  Fax: [phone]

## 2020-07-13 ENCOUNTER — TELEPHONE (OUTPATIENT)
Dept: CARDIOLOGY | Facility: MEDICAL CENTER | Age: 60
End: 2020-07-13

## 2020-07-13 NOTE — TELEPHONE ENCOUNTER
"Dr. Tena's 5-8-19 OV dictation notes discontinuance of Zetia, but pt. Was supposed to continue Crestor 10mg. Dr. Tena's 11-4-19 OV dictation notes:   \" He has had a significant deterioration in his Lipid status since we discontinued Zetia. He would like to try better dietary compliance for a few months before going back on ezetimibe if necessary. \"  To Dr. Magana: okay to continue Crestor 10mg?  "

## 2020-07-13 NOTE — TELEPHONE ENCOUNTER
----- Message from Breana Ni, Med Ass't sent at 7/13/2020 11:03 AM PDT -----  Regarding: requesting statin  Patient is requesting a refill of his [facility] to [facility], not listed on med list and doesn't seem to recall discontinuation of therapy.

## 2020-07-14 NOTE — TELEPHONE ENCOUNTER
Spoke with patient and clarified medications. Patient verbalized understanding and was appreciative for the information.

## 2020-07-14 NOTE — TELEPHONE ENCOUNTER
He is on praluent (alirocumab) in [place] of crestor. No need for crestor at this time as we are monitoring his LFTs. We can discuss restarting crestor vs increasing praluent at our next visit. Thanks!

## 2020-07-27 ENCOUNTER — HOSPITAL ENCOUNTER (OUTPATIENT)
Dept: CARDIOLOGY | Facility: MEDICAL CENTER | Age: 60
End: 2020-07-27
Attending: INTERNAL MEDICINE
Payer: COMMERCIAL

## 2020-07-27 DIAGNOSIS — E11.9 TYPE 2 DIABETES MELLITUS WITHOUT COMPLICATION, WITHOUT LONG-TERM CURRENT USE OF INSULIN (HCC): ICD-10-CM

## 2020-07-27 DIAGNOSIS — I10 ESSENTIAL HYPERTENSION: ICD-10-CM

## 2020-07-27 DIAGNOSIS — I25.10 CORONARY ARTERY DISEASE DUE TO CALCIFIED CORONARY LESION: ICD-10-CM

## 2020-07-27 DIAGNOSIS — E78.5 DYSLIPIDEMIA: ICD-10-CM

## 2020-07-27 DIAGNOSIS — I25.84 CORONARY ARTERY DISEASE DUE TO CALCIFIED CORONARY LESION: ICD-10-CM

## 2020-07-27 DIAGNOSIS — K75.81 STEATOHEPATITIS: ICD-10-CM

## 2020-07-27 DIAGNOSIS — E66.9 OBESITY (BMI 30-39.9): ICD-10-CM

## 2020-07-27 PROCEDURE — 93306 TTE W/DOPPLER COMPLETE: CPT

## 2020-07-28 LAB
LV EJECT FRACT  99904: 60
LV EJECT FRACT MOD 2C 99903: 67.72
LV EJECT FRACT MOD 4C 99902: 58.95
LV EJECT FRACT MOD BP 99901: 62.89

## 2020-07-28 PROCEDURE — 93306 TTE W/DOPPLER COMPLETE: CPT | Mod: 26 | Performed by: INTERNAL MEDICINE

## 2020-07-31 ENCOUNTER — TELEPHONE (OUTPATIENT)
Dept: CARDIOLOGY | Facility: MEDICAL CENTER | Age: 60
End: 2020-07-31

## 2020-08-10 ENCOUNTER — SLEEP CENTER VISIT (OUTPATIENT)
Dept: SLEEP MEDICINE | Facility: MEDICAL CENTER | Age: 60
End: 2020-08-10
Payer: COMMERCIAL

## 2020-08-10 VITALS
BODY MASS INDEX: 35.49 KG/M2 | WEIGHT: 213 LBS | HEIGHT: 65 IN | SYSTOLIC BLOOD PRESSURE: 126 MMHG | HEART RATE: 62 BPM | OXYGEN SATURATION: 93 % | DIASTOLIC BLOOD PRESSURE: 62 MMHG

## 2020-08-10 DIAGNOSIS — G47.33 OSA (OBSTRUCTIVE SLEEP APNEA): ICD-10-CM

## 2020-08-10 DIAGNOSIS — I10 ESSENTIAL HYPERTENSION: ICD-10-CM

## 2020-08-10 PROCEDURE — 99213 OFFICE O/P EST LOW 20 MIN: CPT | Performed by: NURSE PRACTITIONER

## 2020-08-10 ASSESSMENT — FIBROSIS 4 INDEX: FIB4 SCORE: 3.53

## 2020-08-10 NOTE — PROGRESS NOTES
Chief Complaint   Patient presents with   • Follow-Up     DAVID-Last seen 03/16/2020       HPI:      Mr. Coleman is a 58 y/o male patient who is in today for DAVID f/u. PMH includes status post CABG x3 in 1998, CAD, lipidemia, hypertension, obesity, DAVID, steatohepatitis, DM II.     PSG from 12/2019 indicated an AHI of 51.4 that increases to 61.7 in REM and low oxygenation of 71%. There were a total of 213 periodic leg movements, of which 20 were PLMS arousals.  This resulted in a PLMS index of 104.3 and a PLMS arousal index of 9.8 CPAP was tried from 5 cm H2O to 15 cm H2O. he best tolerated pressure of 15 with an improved AHI of 0, mean oxygenation 93%.     Compliance report from 7/11/20-8/9/20 was downloaded and reviewed with the patient which showed autoCPAP at 11-15 cmH2O, 100% compliance, 4 hrs 27 min use (100% compliance), AHI of 0.4. He is tolerating the pressure and mask well.  It took him a little while but he has become more used to the mask.  He goes to bed at 3 am and wakes up between 6-7 am.  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  He tends to typically only sleep about 4 to 5 hours each night.  He does not feel tired during the day.  He denies morning headache or snoring.  He denies any new health problems or medications.      ROS:    Constitutional: Denies fevers, Denies weight changes  Eyes: Denies changes in vision, no eye pain  Ears/Nose/Throat/Mouth: Denies nasal congestion or sore throat   Cardiovascular: Denies chest pain or palpitations   Respiratory: Denies shortness of breath , Denies cough  Gastrointestinal/Hepatic: Denies abdominal pain, nausea, vomiting,    Skin/Breast: Denies rash,   Neurological: Denies headache, confusion,   Psychiatric: denies mood disorder   Sleep: denies snoring       Past Medical History:   Diagnosis Date   • CAD (coronary artery disease)    • Congestive heart failure (HCC)    • Heart valve disease    • High cholesterol    • HTN  (hypertension) 6/30/2014   • Hyperlipidemia 5/14/2012   • Kidney stone    • Liver disease    • Myocardial infarct (HCC) 1998   • Obesity 5/14/2012   • Renal disorder     kidney stones   • S/P CABG (coronary artery bypass graft) 1998    3 vessel       Past Surgical History:   Procedure Laterality Date   • CARDIAC CATH, LEFT HEART  2007    open LIMA to LAD and SVG to Diag which was supplying RCA and LCx by collaterals.    • MULTIPLE CORONARY ARTERY BYPASS  1998    3 vessel, per previous cardiology notes (unable to find records of op report), LIMA to LAD, SVG to inferior marginal (occluded), and SVG to Diagonal artery       Family History   Problem Relation Age of Onset   • Heart Attack Mother    • Sleep Apnea Brother        Social History     Socioeconomic History   • Marital status: Single     Spouse name: Not on file   • Number of children: Not on file   • Years of education: Not on file   • Highest education level: Not on file   Occupational History   • Not on file   Social Needs   • Financial resource strain: Not on file   • Food insecurity     Worry: Not on file     Inability: Not on file   • Transportation needs     Medical: Not on file     Non-medical: Not on file   Tobacco Use   • Smoking status: Never Smoker   • Smokeless tobacco: Never Used   Substance and Sexual Activity   • Alcohol use: No   • Drug use: No   • Sexual activity: Not on file   Lifestyle   • Physical activity     Days per week: Not on file     Minutes per session: Not on file   • Stress: Not on file   Relationships   • Social connections     Talks on phone: Not on file     Gets together: Not on file     Attends Sabianist service: Not on file     Active member of club or organization: Not on file     Attends meetings of clubs or organizations: Not on file     Relationship status: Not on file   • Intimate partner violence     Fear of current or ex partner: Not on file     Emotionally abused: Not on file     Physically abused: Not on file      Forced sexual activity: Not on file   Other Topics Concern   • Not on file   Social History Narrative    Works at [employer].        Allergies as of 08/10/2020   • (No Known Allergies)        Vitals:  Vitals:    08/10/20 1307   BP: 126/62   Pulse: 62   SpO2: 93%       Current medications as of today   Current Outpatient Medications   Medication Sig Dispense Refill   • losartan (COZAAR) 50 MG Tab TAKE ONE TABLET BY MOUTH EVERY DAY 90 Tab 1   • atenolol (TENORMIN) 25 MG Tab Take 1 Tab by mouth every day. 90 Tab 3   • finasteride (PROSCAR) 5 MG Tab Take 5 mg by mouth every day.     • folic acid (FOLVITE) 1 MG Tab Take 2 Tabs by mouth 2 Times a Day. 120 Tab 11   • vitamin D, Ergocalciferol, (DRISDOL) 50521 UNITS Cap capsule Take  by mouth every 14 days.     • Pyridoxine HCl (VITAMIN B6 PO) Take  by mouth every day.     • Coenzyme Q10 (CO Q-10 PO) Take  by mouth every day.     • cyanocobalamin (VITAMIN B-12) 100 MCG TABS Take 100 mcg by mouth every day.       • aspirin 81 MG tablet Take 81 mg by mouth every day.     • Semaglutide (RYBELSUS) 7 MG Tab Take  by mouth.     • Alirocumab 75 MG/ML Solution Pen-injector Inject 75 mg as instructed every 14 days. (Patient not taking: Reported on 8/10/2020) 6 PEN 3   • polyethylene glycol-electrolytes (NULYTELY) 420 GM solution        No current facility-administered medications for this visit.          Physical Exam:   Appearance: Well developed, well nourished, no acute distress  Eyes: PERRL, EOM intact, sclera white, conjunctiva moist  Ears: no lesions or deformities  Hearing: grossly intact  Nose: no lesions or deformities  Respiratory effort: no intercostal retractions or use of accessory muscles  Extremities: no cyanosis or edema  Abdomen: soft   Gait and Station: normal  Digits and nails: no clubbing, cyanosis, petechiae or nodes.  Cranial nerves: grossly intact  Skin: no visible rashes, lesions or ulcers noted  Orientation: Oriented to time, person and place  Mood and  affect: mood and affect appropriate, normal interaction with examiner  Judgement: Intact    Assessment:  1. DAVID (obstructive sleep apnea)     2. Essential hypertension     3. BMI 35.0-35.9,adult           Plan  Discussed the cardiovascular and neuropsychiatric risks of untreated DAVID; including but not limited to: HTN, DM, MI, ASCVD, CVA, CHF, traffic accidents.     1. Compliance report from 7/11/20-8/9/20 was downloaded and reviewed with the patient which showed autoCPAP at 11-15 cmH2O, 100% compliance, 4 hrs 27 min use (100% compliance), AHI of 0.4.  Mask leak at 95th percentile detected at 39.8. Continue autoCPAP. Patient is compliant and benefiting from autoCPAP therapy for management of DAVID.   2. DME order ([employer]) for mask (FFM or MOC) and supplies was not needed today. Continue to clean mask and supplies weekly, and change supplies per insurance guidelines.   3. Continue to stay active.   4. Follow up with the appropriate healthcare practitioners for all other medical problems and issues.  5. Sleep hygiene discussed.  6. F/u in 6 months.       YANIV Campbell.      This dictation was created using voice recognition software. The accuracy of the dictation is limited to the abilities of the software. I expect there may be some errors of grammar and possibly content.

## 2020-08-14 ENCOUNTER — HOSPITAL ENCOUNTER (OUTPATIENT)
Dept: LAB | Facility: MEDICAL CENTER | Age: 60
End: 2020-08-14
Attending: FAMILY MEDICINE
Payer: COMMERCIAL

## 2020-08-14 LAB
ALBUMIN SERPL BCP-MCNC: 4.2 G/DL (ref 3.2–4.9)
ALBUMIN/GLOB SERPL: 1.5 G/DL
ALP SERPL-CCNC: 121 U/L (ref 30–99)
ALT SERPL-CCNC: 86 U/L (ref 2–50)
ANION GAP SERPL CALC-SCNC: 15 MMOL/L (ref 7–16)
APPEARANCE UR: CLEAR
AST SERPL-CCNC: 65 U/L (ref 12–45)
BACTERIA #/AREA URNS HPF: NEGATIVE /HPF
BASOPHILS # BLD AUTO: 0.8 % (ref 0–1.8)
BASOPHILS # BLD: 0.06 K/UL (ref 0–0.12)
BILIRUB SERPL-MCNC: 0.8 MG/DL (ref 0.1–1.5)
BILIRUB UR QL STRIP.AUTO: NEGATIVE
BUN SERPL-MCNC: 22 MG/DL (ref 8–22)
CALCIUM SERPL-MCNC: 9.9 MG/DL (ref 8.5–10.5)
CHLORIDE SERPL-SCNC: 100 MMOL/L (ref 96–112)
CHOLEST SERPL-MCNC: 231 MG/DL (ref 100–199)
CO2 SERPL-SCNC: 22 MMOL/L (ref 20–33)
COLOR UR: YELLOW
CREAT SERPL-MCNC: 1.34 MG/DL (ref 0.5–1.4)
CREAT UR-MCNC: 101.68 MG/DL
EOSINOPHIL # BLD AUTO: 0.17 K/UL (ref 0–0.51)
EOSINOPHIL NFR BLD: 2.3 % (ref 0–6.9)
EPI CELLS #/AREA URNS HPF: NEGATIVE /HPF
ERYTHROCYTE [DISTWIDTH] IN BLOOD BY AUTOMATED COUNT: 47.8 FL (ref 35.9–50)
EST. AVERAGE GLUCOSE BLD GHB EST-MCNC: 217 MG/DL
FASTING STATUS PATIENT QL REPORTED: NORMAL
GLOBULIN SER CALC-MCNC: 2.8 G/DL (ref 1.9–3.5)
GLUCOSE SERPL-MCNC: 157 MG/DL (ref 65–99)
GLUCOSE UR STRIP.AUTO-MCNC: NEGATIVE MG/DL
HBA1C MFR BLD: 9.2 % (ref 0–5.6)
HCT VFR BLD AUTO: 62.9 % (ref 42–52)
HDLC SERPL-MCNC: 30 MG/DL
HGB BLD-MCNC: 19.8 G/DL (ref 14–18)
HYALINE CASTS #/AREA URNS LPF: ABNORMAL /LPF
IMM GRANULOCYTES # BLD AUTO: 0.14 K/UL (ref 0–0.11)
IMM GRANULOCYTES NFR BLD AUTO: 1.9 % (ref 0–0.9)
KETONES UR STRIP.AUTO-MCNC: NEGATIVE MG/DL
LDLC SERPL CALC-MCNC: 151 MG/DL
LEUKOCYTE ESTERASE UR QL STRIP.AUTO: NEGATIVE
LYMPHOCYTES # BLD AUTO: 1.45 K/UL (ref 1–4.8)
LYMPHOCYTES NFR BLD: 19.3 % (ref 22–41)
MCH RBC QN AUTO: 28 PG (ref 27–33)
MCHC RBC AUTO-ENTMCNC: 31.5 G/DL (ref 33.7–35.3)
MCV RBC AUTO: 89.1 FL (ref 81.4–97.8)
MICRO URNS: ABNORMAL
MICROALBUMIN UR-MCNC: >40 MG/DL
MICROALBUMIN/CREAT UR: 393 MG/G (ref 0–30)
MONOCYTES # BLD AUTO: 0.8 K/UL (ref 0–0.85)
MONOCYTES NFR BLD AUTO: 10.7 % (ref 0–13.4)
NEUTROPHILS # BLD AUTO: 4.89 K/UL (ref 1.82–7.42)
NEUTROPHILS NFR BLD: 65 % (ref 44–72)
NITRITE UR QL STRIP.AUTO: NEGATIVE
NRBC # BLD AUTO: 0 K/UL
NRBC BLD-RTO: 0 /100 WBC
PH UR STRIP.AUTO: 5.5 [PH] (ref 5–8)
PLATELET # BLD AUTO: 158 K/UL (ref 164–446)
PMV BLD AUTO: 10.8 FL (ref 9–12.9)
POTASSIUM SERPL-SCNC: 4.5 MMOL/L (ref 3.6–5.5)
PROT SERPL-MCNC: 7 G/DL (ref 6–8.2)
PROT UR QL STRIP: 300 MG/DL
RBC # BLD AUTO: 7.06 M/UL (ref 4.7–6.1)
RBC # URNS HPF: ABNORMAL /HPF
RBC UR QL AUTO: ABNORMAL
SODIUM SERPL-SCNC: 137 MMOL/L (ref 135–145)
SP GR UR STRIP.AUTO: 1.02
TRIGL SERPL-MCNC: 251 MG/DL (ref 0–149)
TSH SERPL DL<=0.005 MIU/L-ACNC: 2.95 UIU/ML (ref 0.38–5.33)
UROBILINOGEN UR STRIP.AUTO-MCNC: 0.2 MG/DL
WBC # BLD AUTO: 7.5 K/UL (ref 4.8–10.8)
WBC #/AREA URNS HPF: ABNORMAL /HPF

## 2020-08-14 PROCEDURE — 81001 URINALYSIS AUTO W/SCOPE: CPT

## 2020-08-14 PROCEDURE — 84443 ASSAY THYROID STIM HORMONE: CPT

## 2020-08-14 PROCEDURE — 82043 UR ALBUMIN QUANTITATIVE: CPT

## 2020-08-14 PROCEDURE — 82306 VITAMIN D 25 HYDROXY: CPT

## 2020-08-14 PROCEDURE — 80061 LIPID PANEL: CPT

## 2020-08-14 PROCEDURE — 87086 URINE CULTURE/COLONY COUNT: CPT

## 2020-08-14 PROCEDURE — 36415 COLL VENOUS BLD VENIPUNCTURE: CPT

## 2020-08-14 PROCEDURE — 80053 COMPREHEN METABOLIC PANEL: CPT

## 2020-08-14 PROCEDURE — 83036 HEMOGLOBIN GLYCOSYLATED A1C: CPT

## 2020-08-14 PROCEDURE — 85025 COMPLETE CBC W/AUTO DIFF WBC: CPT

## 2020-08-14 PROCEDURE — 82570 ASSAY OF URINE CREATININE: CPT

## 2020-08-15 LAB — 25(OH)D3 SERPL-MCNC: 41 NG/ML (ref 30–100)

## 2020-08-17 ENCOUNTER — HOSPITAL ENCOUNTER (OUTPATIENT)
Dept: LAB | Facility: MEDICAL CENTER | Age: 60
End: 2020-08-17
Attending: FAMILY MEDICINE
Payer: COMMERCIAL

## 2020-08-17 LAB
BACTERIA UR CULT: NORMAL
BASOPHILS # BLD AUTO: 0.9 % (ref 0–1.8)
BASOPHILS # BLD: 0.06 K/UL (ref 0–0.12)
EOSINOPHIL # BLD AUTO: 0.18 K/UL (ref 0–0.51)
EOSINOPHIL NFR BLD: 2.6 % (ref 0–6.9)
ERYTHROCYTE [DISTWIDTH] IN BLOOD BY AUTOMATED COUNT: 46 FL (ref 35.9–50)
HCT VFR BLD AUTO: 59.4 % (ref 42–52)
HGB BLD-MCNC: 19.1 G/DL (ref 14–18)
LYMPHOCYTES # BLD AUTO: 1.3 K/UL (ref 1–4.8)
LYMPHOCYTES NFR BLD: 18.3 % (ref 22–41)
MANUAL DIFF BLD: ABNORMAL
MCH RBC QN AUTO: 28 PG (ref 27–33)
MCHC RBC AUTO-ENTMCNC: 32.2 G/DL (ref 33.7–35.3)
MCV RBC AUTO: 87.2 FL (ref 81.4–97.8)
METAMYELOCYTES NFR BLD MANUAL: 0.9 %
MONOCYTES # BLD AUTO: 0.5 K/UL (ref 0–0.85)
MONOCYTES NFR BLD AUTO: 7 % (ref 0–13.4)
NEUTROPHILS # BLD AUTO: 5 K/UL (ref 1.82–7.42)
NEUTROPHILS NFR BLD: 70.4 % (ref 44–72)
PLATELET # BLD AUTO: 153 K/UL (ref 164–446)
PMV BLD AUTO: 10.4 FL (ref 9–12.9)
RBC # BLD AUTO: 6.81 M/UL (ref 4.7–6.1)
SIGNIFICANT IND 70042: NORMAL
SITE SITE: NORMAL
SOURCE SOURCE: NORMAL
WBC # BLD AUTO: 7.1 K/UL (ref 4.8–10.8)

## 2020-08-17 PROCEDURE — 82668 ASSAY OF ERYTHROPOIETIN: CPT

## 2020-08-17 PROCEDURE — 36415 COLL VENOUS BLD VENIPUNCTURE: CPT

## 2020-08-17 PROCEDURE — 85027 COMPLETE CBC AUTOMATED: CPT

## 2020-08-17 PROCEDURE — 85007 BL SMEAR W/DIFF WBC COUNT: CPT

## 2020-08-19 LAB — EPO SERPL-ACNC: 6 MU/ML (ref 4–27)

## 2020-08-24 ENCOUNTER — HOSPITAL ENCOUNTER (OUTPATIENT)
Dept: LAB | Facility: MEDICAL CENTER | Age: 60
End: 2020-08-24
Attending: FAMILY MEDICINE
Payer: COMMERCIAL

## 2020-08-24 LAB
FERRITIN SERPL-MCNC: 325 NG/ML (ref 22–322)
HGB RETIC QN AUTO: 34.1 PG/CELL (ref 29–35)
IMM RETICS NFR: 15.5 % (ref 9.3–17.4)
IRON SATN MFR SERPL: 22 % (ref 15–55)
IRON SERPL-MCNC: 75 UG/DL (ref 50–180)
RETICS # AUTO: 0.14 M/UL (ref 0.04–0.06)
RETICS/RBC NFR: 2 % (ref 0.8–2.1)
TIBC SERPL-MCNC: 348 UG/DL (ref 250–450)
TRANSFERRIN SERPL-MCNC: 275 MG/DL (ref 200–370)
UIBC SERPL-MCNC: 273 UG/DL (ref 110–370)

## 2020-08-24 PROCEDURE — 36415 COLL VENOUS BLD VENIPUNCTURE: CPT

## 2020-08-24 PROCEDURE — 85046 RETICYTE/HGB CONCENTRATE: CPT

## 2020-08-24 PROCEDURE — 84466 ASSAY OF TRANSFERRIN: CPT

## 2020-08-24 PROCEDURE — 82728 ASSAY OF FERRITIN: CPT

## 2020-08-24 PROCEDURE — 83540 ASSAY OF IRON: CPT

## 2020-08-24 PROCEDURE — 83550 IRON BINDING TEST: CPT

## 2020-11-08 DIAGNOSIS — I10 ESSENTIAL HYPERTENSION: ICD-10-CM

## 2020-11-09 ENCOUNTER — HOSPITAL ENCOUNTER (OUTPATIENT)
Dept: LAB | Facility: MEDICAL CENTER | Age: 60
End: 2020-11-09
Attending: FAMILY MEDICINE
Payer: COMMERCIAL

## 2020-11-09 LAB
ALBUMIN SERPL BCP-MCNC: 4.2 G/DL (ref 3.2–4.9)
ALBUMIN/GLOB SERPL: 1.6 G/DL
ALP SERPL-CCNC: 120 U/L (ref 30–99)
ALT SERPL-CCNC: 63 U/L (ref 2–50)
ANION GAP SERPL CALC-SCNC: 13 MMOL/L (ref 7–16)
AST SERPL-CCNC: 52 U/L (ref 12–45)
BASOPHILS # BLD AUTO: 0.7 % (ref 0–1.8)
BASOPHILS # BLD: 0.05 K/UL (ref 0–0.12)
BILIRUB SERPL-MCNC: 0.7 MG/DL (ref 0.1–1.5)
BUN SERPL-MCNC: 20 MG/DL (ref 8–22)
CALCIUM SERPL-MCNC: 9.5 MG/DL (ref 8.5–10.5)
CHLORIDE SERPL-SCNC: 103 MMOL/L (ref 96–112)
CHOLEST SERPL-MCNC: 199 MG/DL (ref 100–199)
CO2 SERPL-SCNC: 20 MMOL/L (ref 20–33)
CREAT SERPL-MCNC: 1.34 MG/DL (ref 0.5–1.4)
CREAT UR-MCNC: 148.96 MG/DL
EOSINOPHIL # BLD AUTO: 0.17 K/UL (ref 0–0.51)
EOSINOPHIL NFR BLD: 2.4 % (ref 0–6.9)
ERYTHROCYTE [DISTWIDTH] IN BLOOD BY AUTOMATED COUNT: 45.9 FL (ref 35.9–50)
EST. AVERAGE GLUCOSE BLD GHB EST-MCNC: 192 MG/DL
FASTING STATUS PATIENT QL REPORTED: NORMAL
GLOBULIN SER CALC-MCNC: 2.6 G/DL (ref 1.9–3.5)
GLUCOSE SERPL-MCNC: 136 MG/DL (ref 65–99)
HBA1C MFR BLD: 8.3 % (ref 0–5.6)
HCT VFR BLD AUTO: 61.2 % (ref 42–52)
HDLC SERPL-MCNC: 31 MG/DL
HGB BLD-MCNC: 19.5 G/DL (ref 14–18)
IMM GRANULOCYTES # BLD AUTO: 0.09 K/UL (ref 0–0.11)
IMM GRANULOCYTES NFR BLD AUTO: 1.3 % (ref 0–0.9)
LDLC SERPL CALC-MCNC: 123 MG/DL
LYMPHOCYTES # BLD AUTO: 0.98 K/UL (ref 1–4.8)
LYMPHOCYTES NFR BLD: 13.9 % (ref 22–41)
MCH RBC QN AUTO: 27.9 PG (ref 27–33)
MCHC RBC AUTO-ENTMCNC: 31.9 G/DL (ref 33.7–35.3)
MCV RBC AUTO: 87.4 FL (ref 81.4–97.8)
MICROALBUMIN UR-MCNC: 185.3 MG/DL
MICROALBUMIN/CREAT UR: 1244 MG/G (ref 0–30)
MONOCYTES # BLD AUTO: 0.7 K/UL (ref 0–0.85)
MONOCYTES NFR BLD AUTO: 9.9 % (ref 0–13.4)
NEUTROPHILS # BLD AUTO: 5.05 K/UL (ref 1.82–7.42)
NEUTROPHILS NFR BLD: 71.8 % (ref 44–72)
NRBC # BLD AUTO: 0 K/UL
NRBC BLD-RTO: 0 /100 WBC
PLATELET # BLD AUTO: 155 K/UL (ref 164–446)
PMV BLD AUTO: 11.2 FL (ref 9–12.9)
POTASSIUM SERPL-SCNC: 4.4 MMOL/L (ref 3.6–5.5)
PROT SERPL-MCNC: 6.8 G/DL (ref 6–8.2)
RBC # BLD AUTO: 7 M/UL (ref 4.7–6.1)
SODIUM SERPL-SCNC: 136 MMOL/L (ref 135–145)
TRIGL SERPL-MCNC: 227 MG/DL (ref 0–149)
TSH SERPL DL<=0.005 MIU/L-ACNC: 1.9 UIU/ML (ref 0.38–5.33)
WBC # BLD AUTO: 7 K/UL (ref 4.8–10.8)

## 2020-11-09 PROCEDURE — 80061 LIPID PANEL: CPT

## 2020-11-09 PROCEDURE — 82043 UR ALBUMIN QUANTITATIVE: CPT

## 2020-11-09 PROCEDURE — 83036 HEMOGLOBIN GLYCOSYLATED A1C: CPT

## 2020-11-09 PROCEDURE — 84443 ASSAY THYROID STIM HORMONE: CPT

## 2020-11-09 PROCEDURE — 85025 COMPLETE CBC W/AUTO DIFF WBC: CPT

## 2020-11-09 PROCEDURE — 36415 COLL VENOUS BLD VENIPUNCTURE: CPT

## 2020-11-09 PROCEDURE — 80053 COMPREHEN METABOLIC PANEL: CPT

## 2020-11-09 PROCEDURE — 82570 ASSAY OF URINE CREATININE: CPT

## 2020-11-10 RX ORDER — LOSARTAN POTASSIUM 50 MG/1
TABLET ORAL
Qty: 90 TAB | Refills: 2 | Status: SHIPPED | OUTPATIENT
Start: 2020-11-10 | End: 2021-04-22 | Stop reason: SDUPTHER

## 2020-12-24 ENCOUNTER — TELEPHONE (OUTPATIENT)
Dept: CARDIOLOGY | Facility: MEDICAL CENTER | Age: 60
End: 2020-12-24

## 2020-12-24 DIAGNOSIS — E78.5 HYPERLIPIDEMIA, UNSPECIFIED HYPERLIPIDEMIA TYPE: ICD-10-CM

## 2020-12-24 DIAGNOSIS — E78.5 DYSLIPIDEMIA: ICD-10-CM

## 2020-12-24 DIAGNOSIS — E11.9 TYPE 2 DIABETES MELLITUS WITHOUT COMPLICATION, WITHOUT LONG-TERM CURRENT USE OF INSULIN (HCC): ICD-10-CM

## 2020-12-24 DIAGNOSIS — I10 ESSENTIAL HYPERTENSION: ICD-10-CM

## 2020-12-24 NOTE — TELEPHONE ENCOUNTER
JM    Patient is all out of their Alirocumab 75 MG/ML Solution Pen-injector. Pt would like it to be sent to the [facility].    Thank you,  Breana AMDAO

## 2020-12-24 NOTE — TELEPHONE ENCOUNTER
Upon chart review, last office visit note stated may increase dose pending labs.  Routed to Dr. Magana for recommendations.

## 2020-12-26 NOTE — TELEPHONE ENCOUNTER
Yes let's increase to 150mg of praluent SC Q 2 weeks. Recheck lipid panel, hgbA1c, and CMP in 3 months after this dose change. Thanks!

## 2020-12-28 ENCOUNTER — TELEPHONE (OUTPATIENT)
Dept: CARDIOLOGY | Facility: MEDICAL CENTER | Age: 60
End: 2020-12-28

## 2020-12-28 DIAGNOSIS — E78.5 HYPERLIPIDEMIA, UNSPECIFIED HYPERLIPIDEMIA TYPE: ICD-10-CM

## 2020-12-28 DIAGNOSIS — E78.5 DYSLIPIDEMIA: ICD-10-CM

## 2020-12-28 NOTE — TELEPHONE ENCOUNTER
Severo Coleman Key: [id number] - PA Case ID: [id number] - Rx #: [id number] Need help? Call us at [phone]   Status   Sent to [facility]   DrugPraluent 150MG/ML auto-injectors   Domingo Carr Prior Authorization Request Form   Original Claim Info76

## 2020-12-28 NOTE — TELEPHONE ENCOUNTER
Called and spoke to the patient, discussed the increase in dose and being sent to his pharmacy.  Also notified him to get lab work done 3 months after starting the new dosage.  Answered all questions and concerns, asked that lab slips be mailed as well.   Lab orders placed.  New RX placed for 150mg.

## 2021-01-04 NOTE — TELEPHONE ENCOUNTER
Received approval letter from patients [facility] for praluent. Letter sent to scanning.

## 2021-02-08 DIAGNOSIS — I25.119 CORONARY ARTERY DISEASE INVOLVING NATIVE CORONARY ARTERY OF NATIVE HEART WITH ANGINA PECTORIS (HCC): ICD-10-CM

## 2021-02-10 ENCOUNTER — HOSPITAL ENCOUNTER (OUTPATIENT)
Dept: LAB | Facility: MEDICAL CENTER | Age: 61
End: 2021-02-10
Attending: FAMILY MEDICINE
Payer: COMMERCIAL

## 2021-02-10 LAB
ALBUMIN SERPL BCP-MCNC: 4.1 G/DL (ref 3.2–4.9)
ALBUMIN/GLOB SERPL: 1.4 G/DL
ALP SERPL-CCNC: 126 U/L (ref 30–99)
ALT SERPL-CCNC: 56 U/L (ref 2–50)
ANION GAP SERPL CALC-SCNC: 11 MMOL/L (ref 7–16)
AST SERPL-CCNC: 37 U/L (ref 12–45)
BASOPHILS # BLD AUTO: 0.6 % (ref 0–1.8)
BASOPHILS # BLD: 0.05 K/UL (ref 0–0.12)
BILIRUB SERPL-MCNC: 0.7 MG/DL (ref 0.1–1.5)
BUN SERPL-MCNC: 31 MG/DL (ref 8–22)
CALCIUM SERPL-MCNC: 9.4 MG/DL (ref 8.5–10.5)
CHLORIDE SERPL-SCNC: 102 MMOL/L (ref 96–112)
CHOLEST SERPL-MCNC: 164 MG/DL (ref 100–199)
CO2 SERPL-SCNC: 22 MMOL/L (ref 20–33)
CREAT SERPL-MCNC: 1.41 MG/DL (ref 0.5–1.4)
CREAT UR-MCNC: 94.32 MG/DL
EOSINOPHIL # BLD AUTO: 0.2 K/UL (ref 0–0.51)
EOSINOPHIL NFR BLD: 2.3 % (ref 0–6.9)
ERYTHROCYTE [DISTWIDTH] IN BLOOD BY AUTOMATED COUNT: 48.5 FL (ref 35.9–50)
FASTING STATUS PATIENT QL REPORTED: NORMAL
GLOBULIN SER CALC-MCNC: 3 G/DL (ref 1.9–3.5)
GLUCOSE SERPL-MCNC: 166 MG/DL (ref 65–99)
HCT VFR BLD AUTO: 63.5 % (ref 42–52)
HDLC SERPL-MCNC: 27 MG/DL
HGB BLD-MCNC: 20.2 G/DL (ref 14–18)
IMM GRANULOCYTES # BLD AUTO: 0.23 K/UL (ref 0–0.11)
IMM GRANULOCYTES NFR BLD AUTO: 2.6 % (ref 0–0.9)
LDLC SERPL CALC-MCNC: 64 MG/DL
LYMPHOCYTES # BLD AUTO: 1.49 K/UL (ref 1–4.8)
LYMPHOCYTES NFR BLD: 16.9 % (ref 22–41)
MCH RBC QN AUTO: 28.5 PG (ref 27–33)
MCHC RBC AUTO-ENTMCNC: 31.8 G/DL (ref 33.7–35.3)
MCV RBC AUTO: 89.7 FL (ref 81.4–97.8)
MICROALBUMIN UR-MCNC: 91.8 MG/DL
MICROALBUMIN/CREAT UR: 973 MG/G (ref 0–30)
MONOCYTES # BLD AUTO: 0.85 K/UL (ref 0–0.85)
MONOCYTES NFR BLD AUTO: 9.7 % (ref 0–13.4)
NEUTROPHILS # BLD AUTO: 5.98 K/UL (ref 1.82–7.42)
NEUTROPHILS NFR BLD: 67.9 % (ref 44–72)
NRBC # BLD AUTO: 0 K/UL
NRBC BLD-RTO: 0 /100 WBC
PLATELET # BLD AUTO: 162 K/UL (ref 164–446)
PMV BLD AUTO: 10.4 FL (ref 9–12.9)
POTASSIUM SERPL-SCNC: 4.3 MMOL/L (ref 3.6–5.5)
PROT SERPL-MCNC: 7.1 G/DL (ref 6–8.2)
RBC # BLD AUTO: 7.08 M/UL (ref 4.7–6.1)
SODIUM SERPL-SCNC: 135 MMOL/L (ref 135–145)
TRIGL SERPL-MCNC: 363 MG/DL (ref 0–149)
TSH SERPL DL<=0.005 MIU/L-ACNC: 3.31 UIU/ML (ref 0.38–5.33)
WBC # BLD AUTO: 8.8 K/UL (ref 4.8–10.8)

## 2021-02-10 PROCEDURE — 80061 LIPID PANEL: CPT

## 2021-02-10 PROCEDURE — 82043 UR ALBUMIN QUANTITATIVE: CPT

## 2021-02-10 PROCEDURE — 85025 COMPLETE CBC W/AUTO DIFF WBC: CPT

## 2021-02-10 PROCEDURE — 84443 ASSAY THYROID STIM HORMONE: CPT

## 2021-02-10 PROCEDURE — 80053 COMPREHEN METABOLIC PANEL: CPT

## 2021-02-10 PROCEDURE — 82570 ASSAY OF URINE CREATININE: CPT

## 2021-02-10 PROCEDURE — 36415 COLL VENOUS BLD VENIPUNCTURE: CPT

## 2021-02-10 PROCEDURE — 83036 HEMOGLOBIN GLYCOSYLATED A1C: CPT

## 2021-02-10 RX ORDER — ATENOLOL 25 MG/1
25 TABLET ORAL DAILY
Qty: 90 TABLET | Refills: 1 | Status: SHIPPED | OUTPATIENT
Start: 2021-02-10 | End: 2021-02-18 | Stop reason: SDUPTHER

## 2021-02-11 LAB
EST. AVERAGE GLUCOSE BLD GHB EST-MCNC: 171 MG/DL
HBA1C MFR BLD: 7.6 % (ref 0–5.6)

## 2021-02-18 DIAGNOSIS — I25.119 CORONARY ARTERY DISEASE INVOLVING NATIVE CORONARY ARTERY OF NATIVE HEART WITH ANGINA PECTORIS (HCC): ICD-10-CM

## 2021-02-19 RX ORDER — ATENOLOL 25 MG/1
25 TABLET ORAL DAILY
Qty: 90 TABLET | Refills: 1 | Status: SHIPPED | OUTPATIENT
Start: 2021-02-19 | End: 2021-04-22 | Stop reason: SDUPTHER

## 2021-04-10 ENCOUNTER — HOSPITAL ENCOUNTER (OUTPATIENT)
Dept: LAB | Facility: MEDICAL CENTER | Age: 61
End: 2021-04-10
Attending: INTERNAL MEDICINE
Payer: COMMERCIAL

## 2021-04-10 DIAGNOSIS — I25.84 CORONARY ARTERY DISEASE DUE TO CALCIFIED CORONARY LESION: ICD-10-CM

## 2021-04-10 DIAGNOSIS — I25.10 CORONARY ARTERY DISEASE DUE TO CALCIFIED CORONARY LESION: ICD-10-CM

## 2021-04-10 DIAGNOSIS — K75.81 STEATOHEPATITIS: ICD-10-CM

## 2021-04-10 DIAGNOSIS — I10 ESSENTIAL HYPERTENSION: ICD-10-CM

## 2021-04-10 DIAGNOSIS — E78.5 DYSLIPIDEMIA: ICD-10-CM

## 2021-04-10 DIAGNOSIS — E66.9 OBESITY (BMI 30-39.9): ICD-10-CM

## 2021-04-10 DIAGNOSIS — E11.9 TYPE 2 DIABETES MELLITUS WITHOUT COMPLICATION, WITHOUT LONG-TERM CURRENT USE OF INSULIN (HCC): ICD-10-CM

## 2021-04-10 LAB
ALBUMIN SERPL BCP-MCNC: 4.1 G/DL (ref 3.2–4.9)
ALBUMIN/GLOB SERPL: 1.4 G/DL
ALP SERPL-CCNC: 129 U/L (ref 30–99)
ALT SERPL-CCNC: 63 U/L (ref 2–50)
ANION GAP SERPL CALC-SCNC: 10 MMOL/L (ref 7–16)
AST SERPL-CCNC: 54 U/L (ref 12–45)
BILIRUB SERPL-MCNC: 1 MG/DL (ref 0.1–1.5)
BUN SERPL-MCNC: 23 MG/DL (ref 8–22)
CALCIUM SERPL-MCNC: 9.5 MG/DL (ref 8.5–10.5)
CHLORIDE SERPL-SCNC: 104 MMOL/L (ref 96–112)
CHOLEST SERPL-MCNC: 151 MG/DL (ref 100–199)
CO2 SERPL-SCNC: 22 MMOL/L (ref 20–33)
CREAT SERPL-MCNC: 1.36 MG/DL (ref 0.5–1.4)
EST. AVERAGE GLUCOSE BLD GHB EST-MCNC: 214 MG/DL
FASTING STATUS PATIENT QL REPORTED: NORMAL
GLOBULIN SER CALC-MCNC: 3 G/DL (ref 1.9–3.5)
GLUCOSE SERPL-MCNC: 132 MG/DL (ref 65–99)
HBA1C MFR BLD: 9.1 % (ref 4–5.6)
HDLC SERPL-MCNC: 28 MG/DL
LDLC SERPL CALC-MCNC: 74 MG/DL
POTASSIUM SERPL-SCNC: 4.9 MMOL/L (ref 3.6–5.5)
PROT SERPL-MCNC: 7.1 G/DL (ref 6–8.2)
SODIUM SERPL-SCNC: 136 MMOL/L (ref 135–145)
TRIGL SERPL-MCNC: 243 MG/DL (ref 0–149)

## 2021-04-10 PROCEDURE — 83036 HEMOGLOBIN GLYCOSYLATED A1C: CPT

## 2021-04-10 PROCEDURE — 80053 COMPREHEN METABOLIC PANEL: CPT

## 2021-04-10 PROCEDURE — 80061 LIPID PANEL: CPT

## 2021-04-10 PROCEDURE — 36415 COLL VENOUS BLD VENIPUNCTURE: CPT

## 2021-04-19 ENCOUNTER — HOSPITAL ENCOUNTER (OUTPATIENT)
Dept: LAB | Facility: MEDICAL CENTER | Age: 61
End: 2021-04-19
Attending: PHYSICIAN ASSISTANT
Payer: COMMERCIAL

## 2021-04-19 PROCEDURE — 84153 ASSAY OF PSA TOTAL: CPT

## 2021-04-19 PROCEDURE — 36415 COLL VENOUS BLD VENIPUNCTURE: CPT

## 2021-04-20 LAB — PSA SERPL-MCNC: 0.31 NG/ML (ref 0–4)

## 2021-04-22 ENCOUNTER — OFFICE VISIT (OUTPATIENT)
Dept: CARDIOLOGY | Facility: MEDICAL CENTER | Age: 61
End: 2021-04-22
Payer: COMMERCIAL

## 2021-04-22 VITALS
SYSTOLIC BLOOD PRESSURE: 110 MMHG | WEIGHT: 216.05 LBS | BODY MASS INDEX: 36 KG/M2 | OXYGEN SATURATION: 94 % | DIASTOLIC BLOOD PRESSURE: 62 MMHG | HEIGHT: 65 IN | HEART RATE: 52 BPM

## 2021-04-22 DIAGNOSIS — I25.84 CORONARY ARTERY DISEASE DUE TO CALCIFIED CORONARY LESION: ICD-10-CM

## 2021-04-22 DIAGNOSIS — E66.9 OBESITY (BMI 30-39.9): ICD-10-CM

## 2021-04-22 DIAGNOSIS — I25.119 CORONARY ARTERY DISEASE INVOLVING NATIVE CORONARY ARTERY OF NATIVE HEART WITH ANGINA PECTORIS (HCC): ICD-10-CM

## 2021-04-22 DIAGNOSIS — I10 ESSENTIAL HYPERTENSION: ICD-10-CM

## 2021-04-22 DIAGNOSIS — Z95.1 S/P CABG (CORONARY ARTERY BYPASS GRAFT): Chronic | ICD-10-CM

## 2021-04-22 DIAGNOSIS — I25.10 CORONARY ARTERY DISEASE DUE TO CALCIFIED CORONARY LESION: ICD-10-CM

## 2021-04-22 DIAGNOSIS — E78.5 DYSLIPIDEMIA: ICD-10-CM

## 2021-04-22 DIAGNOSIS — G47.33 OSA (OBSTRUCTIVE SLEEP APNEA): ICD-10-CM

## 2021-04-22 DIAGNOSIS — E11.9 TYPE 2 DIABETES MELLITUS WITHOUT COMPLICATION, WITHOUT LONG-TERM CURRENT USE OF INSULIN (HCC): ICD-10-CM

## 2021-04-22 DIAGNOSIS — K75.81 STEATOHEPATITIS: ICD-10-CM

## 2021-04-22 PROCEDURE — 99214 OFFICE O/P EST MOD 30 MIN: CPT | Performed by: INTERNAL MEDICINE

## 2021-04-22 RX ORDER — GLIMEPIRIDE 1 MG/1
1 TABLET ORAL
COMMUNITY
Start: 2021-04-07 | End: 2023-01-09

## 2021-04-22 RX ORDER — LOSARTAN POTASSIUM 50 MG/1
50 TABLET ORAL
Qty: 90 TABLET | Refills: 3 | Status: SHIPPED | OUTPATIENT
Start: 2021-04-22 | End: 2022-05-04

## 2021-04-22 RX ORDER — CHOLECALCIFEROL (VITAMIN D3) 125 MCG
5000 CAPSULE ORAL DAILY
COMMUNITY

## 2021-04-22 RX ORDER — DAPAGLIFLOZIN 5 MG/1
1 TABLET, FILM COATED ORAL
COMMUNITY
Start: 2021-04-07 | End: 2022-12-05

## 2021-04-22 RX ORDER — ICOSAPENT ETHYL 1000 MG/1
2 CAPSULE ORAL 2 TIMES DAILY
Qty: 360 CAPSULE | Refills: 3 | Status: SHIPPED | OUTPATIENT
Start: 2021-04-22 | End: 2021-04-27

## 2021-04-22 RX ORDER — ATENOLOL 25 MG/1
25 TABLET ORAL DAILY
Qty: 90 TABLET | Refills: 3 | Status: SHIPPED | OUTPATIENT
Start: 2021-04-22 | End: 2022-05-04

## 2021-04-22 ASSESSMENT — FIBROSIS 4 INDEX: FIB4 SCORE: 2.48

## 2021-04-22 NOTE — PATIENT INSTRUCTIONS
Please start vascepa 2g twice a day for your high triglycerides.     Please let me know if you don't hear about scheduling your referral within 2 weeks.

## 2021-04-22 NOTE — PROGRESS NOTES
Cardiology Follow-up Consultation Note    Date of note:    4/22/2021   Primary Care Provider: Jacey Elizondo M.D.  Referring Provider: Tez Tena M.D.     Patient Name: Severo Coleman   YOB: 1960  MRN:              [id number]    Chief Complaint: CAD    History of Present Illness: Severo Coleman is a 60 y.o. male whose current medical problems include diabetes, DAVID on CPAP, CAD s/p CABG, hypertension, dyslipidemia who is here for follow-up.    At our visit, 7/2/2020:  Liver enzymes elevated for years, relatively stable.     In terms of DAVID, on CPAP.     Started praluent 1 year ago.     Interim Events:  Got J+J vaccine last week.     In terms of CAD, no angina. Does 20-30 minutes on the treadmill around 3 times a week.     In terms of hypertension, BP at home in the 120s.     Diabetes worsening.       ROS  Constitution: Negative for chills, fever and night sweats.   HENT: Negative for nosebleeds.    Eyes: Negative for vision loss in left eye and vision loss in right eye.   Respiratory: Negative for hemoptysis.    Gastrointestinal: Negative for hematemesis, hematochezia and melena.   Genitourinary: Negative for hematuria.   Neurological: Negative for focal weakness, numbness and paresthesias.       Past Medical History:   Diagnosis Date   • CAD (coronary artery disease)    • Congestive heart failure (HCC)    • Heart valve disease    • High cholesterol    • HTN (hypertension) 6/30/2014   • Hyperlipidemia 5/14/2012   • Kidney stone    • Liver disease    • Myocardial infarct (HCC) 1998   • Obesity 5/14/2012   • Renal disorder     kidney stones   • S/P CABG (coronary artery bypass graft) 1998    3 vessel         Past Surgical History:   Procedure Laterality Date   • CARDIAC CATH, LEFT HEART  2007    open LIMA to LAD and SVG to Diag which was supplying RCA and LCx by collaterals.    • MULTIPLE CORONARY ARTERY BYPASS  1998    3 vessel, per previous cardiology notes (unable to find  records of op report), LIMA to LAD, SVG to inferior marginal (occluded), and SVG to Diagonal artery         Current Outpatient Medications   Medication Sig Dispense Refill   • FARXIGA 5 MG Tab Take 1 tablet by mouth every day.     • glimepiride (AMARYL) 1 MG tablet Take 1 mg by mouth every day.     • Cholecalciferol (VITAMIN D) 125 MCG (5000 UT) Cap Take 5,000 Units by mouth every day.     • atenolol (TENORMIN) 25 MG Tab Take 1 tablet by mouth every day. 90 tablet 1   • Alirocumab 150 MG/ML Solution Auto-injector Inject 150 mg under the skin every 14 days. 6 Each 3   • losartan (COZAAR) 50 MG Tab TAKE 1 TABLET BY MOUTH EVERY DAY 90 Tab 2   • finasteride (PROSCAR) 5 MG Tab Take 5 mg by mouth every day.     • folic acid (FOLVITE) 1 MG Tab Take 2 Tabs by mouth 2 Times a Day. 120 Tab 11   • Pyridoxine HCl (VITAMIN B6 PO) Take  by mouth every day.     • Coenzyme Q10 (CO Q-10 PO) Take  by mouth every day.     • cyanocobalamin (VITAMIN B-12) 100 MCG TABS Take 100 mcg by mouth every day.       • aspirin 81 MG tablet Take 81 mg by mouth every day.     • Semaglutide (RYBELSUS) 7 MG Tab Take  by mouth.       No current facility-administered medications for this visit.         No Known Allergies      Family History   Problem Relation Age of Onset   • Heart Attack Mother    • Sleep Apnea Brother          Social History     Socioeconomic History   • Marital status: Single     Spouse name: Not on file   • Number of children: Not on file   • Years of education: Not on file   • Highest education level: Not on file   Occupational History   • Not on file   Tobacco Use   • Smoking status: Never Smoker   • Smokeless tobacco: Never Used   Substance and Sexual Activity   • Alcohol use: No   • Drug use: No   • Sexual activity: Not on file   Other Topics Concern   • Not on file   Social History Narrative    Works at [employer].      Social Determinants of Health     Financial Resource Strain:    • Difficulty of Paying Living Expenses:   "  Food Insecurity:    • Worried About Running Out of Food in the Last Year:    • Ran Out of Food in the Last Year:    Transportation Needs:    • Lack of Transportation (Medical):    • Lack of Transportation (Non-Medical):    Physical Activity:    • Days of Exercise per Week:    • Minutes of Exercise per Session:    Stress:    • Feeling of Stress :    Social Connections:    • Frequency of Communication with Friends and Family:    • Frequency of Social Gatherings with Friends and Family:    • Attends Faith Services:    • Active Member of Clubs or Organizations:    • Attends Club or Organization Meetings:    • Marital Status:    Intimate Partner Violence:    • Fear of Current or Ex-Partner:    • Emotionally Abused:    • Physically Abused:    • Sexually Abused:          Physical Exam:  Ambulatory Vitals  /62 (BP Location: Left arm, Patient Position: Sitting, BP Cuff Size: Adult)   Pulse (!) 52   Ht 1.651 m (5' 5\")   Wt 98 kg (216 lb 0.8 oz)   SpO2 94%    Oxygen Therapy:  Pulse Oximetry: 94 %  BP Readings from Last 4 Encounters:   04/22/21 110/62   08/10/20 126/62   07/02/20 136/86   03/16/20 128/64       Weight/BMI: Body mass index is 35.95 kg/m².  Wt Readings from Last 4 Encounters:   04/22/21 98 kg (216 lb 0.8 oz)   08/10/20 96.6 kg (213 lb)   07/02/20 97.5 kg (215 lb)   03/16/20 95.7 kg (211 lb)     General: No apparent distress  Eyes: nl conjunctiva  ENT: OP covered by mask, normal external appearance of nose and ears  Neck: JVP <8 cm H2O, no carotid bruits  Lungs: normal respiratory effort, CTAB  Heart: RRR, no murmurs, no rubs or gallops, trace edema bilateral lower extremities. No LV/RV heave on cardiac palpatation. 2+ bilateral radial pulses.   Abdomen: soft, non tender, non distended, no masses, normal bowel sounds.  No HSM.  Extremities/MSK: no clubbing, no cyanosis  Neurological: No focal sensory deficits  Psychiatric: Appropriate affect, A/O x 3, intact judgement and insight  Skin: Warm " extremities    Exam repeated in full and unchanged except for as noted above.      Lab Data Review:  Lab Results   Component Value Date/Time    CHOLSTRLTOT 151 04/10/2021 08:47 AM    LDL 74 04/10/2021 08:47 AM    HDL 28 (A) 04/10/2021 08:47 AM    TRIGLYCERIDE 243 (H) 04/10/2021 08:47 AM       Lab Results   Component Value Date/Time    SODIUM 136 04/10/2021 08:47 AM    POTASSIUM 4.9 04/10/2021 08:47 AM    CHLORIDE 104 04/10/2021 08:47 AM    CO2 22 04/10/2021 08:47 AM    GLUCOSE 132 (H) 04/10/2021 08:47 AM    BUN 23 (H) 04/10/2021 08:47 AM    CREATININE 1.36 04/10/2021 08:47 AM     Lab Results   Component Value Date/Time    ALKPHOSPHAT 129 (H) 04/10/2021 08:47 AM    ASTSGOT 54 (H) 04/10/2021 08:47 AM    ALTSGPT 63 (H) 04/10/2021 08:47 AM    TBILIRUBIN 1.0 04/10/2021 08:47 AM      Lab Results   Component Value Date/Time    WBC 8.8 02/10/2021 08:12 AM     No components found for: HBGA1C  No components found for: TROPONIN  No results found for: BNP      Cardiac Imaging and Procedures Review:    EKG dated 7/2/2020 : My personal interpretation is sinus bradycardia, previously large anteroseptal-lateral infarct, LAD, TOMAS, LVH. Posterior axis.       Echo dated 7/28/2020:  CONCLUSIONS  Normal right and left ventricular size and function.   Mild concentric left ventricular hypertrophy.  Wall motion not well visualized however appeared grossly normal.  No significant valvular stenosis or regurgitation.   Normal estiamted right atrial pressure.  Unable to estimate pulmonary artery pressure due to an inadequate   tricuspid regurgitant jet.     Compared to the previous echocardiogram performed on 08/26/2013: There   has been no significant change.     Normal left ventricular chamber size. Mild concentric left ventricular   hypertrophy. Left ventricular ejection fraction is visually estimated   to be 60%. Wall motion not well visualized however appeared grossly   normal. Normal diastolic function.      Echo dated 2013:    CONCLUSIONS   Normal left ventricular systolic function.   Mild to moderate concentric left ventricular hypertrophy.   Left ventricular ejection fraction is 55% to 60%.   No significant valve abnormalities.     Radiology test Review:  CXR:   FINDINGS:  The patient is status post CABG. The cardiac silhouette is enlarged.  No pulmonary infiltrates or consolidations are noted.  No pleural effusions are appreciated.    Liver ultrasound 2016:  IMPRESSION:     1.  Cholelithiasis.  2.  Increased liver echogenicity consistent with hepatic steatosis.  3.  8 mm cyst in left lobe of liver.  4.  3.2 cm cyst in the right kidney.    Liver Biopsy 2016:  A. Liver Biopsy:          Benign liver tissue showing moderate fatty change (approximately           35% of hepatocytes show steatosis).          The lobules also show mild lobular chronic inflammation, slight           acute inflammation and rare degenerated hepatocytes.          The portal tracts show mild chronic inflammation, minimal acute           inflammation and rare eosinophils; the bile ducts are           unremarkable except for one duct which shows focal acute           inflammation of its wall.          The trichrome and reticulin stains do not show significant           fibrosis or architectural distortion.          The iron stain does not demonstrate increased iron deposition     Comment: The findings fit for steatohepatitis without significant   fibrosis.  Drugs and toxins are possible causes.  Clinical correlation   is suggested.  Clinical information from Dr. Hou's office has also   been reviewed in conjunction with the biopsy.       Medical Decision Makin. Coronary artery disease due to a calcified coronary lesion: Status post CABG x3 in .  Occluded SVG to OM with a patent LIMA to the LAD in   Per notes, appears LIMA and SVG to diag are open, however other notes say only LIMA is open. Currently asymptomatic, however EKG showed  quite extensive anterolateral infarct. Wall motion and LVEF were grossly normal 7/2020.   -continue aspirin 81mg PO Daily  -continue praluent  -continue atenolol, will consider switching to metoprolol in the future.     2. Dyslipidemia  Continue praluent as per above.   -start vascepa for high triglycerides.     3. Essential hypertension  Well controlled at home  -continue atenolol, losartan.     4. Obesity (BMI 30-39.9)  Weight management referral.     5. DAVID (obstructive sleep apnea)  CPAP    6. Steatohepatitis  Concern for obesity related vs drug induced based on biopsy. Stable   -repeat Q3-6 months. If worsens may need to evaluate PCSK9 and if this is contributing, I think this is unlikely.     7. Type 2 diabetes mellitus without complication, without long-term current use of insulin (HCC)  Per PCP, poorly controlled currently.   -f/u with Dr. Elizondo as scheduled.       Return in about 1 year (around 4/22/2022).      Ismael Magana MD, [facility]  [facility], [address]  [address] [address]  Phone: [phone]  Fax: [phone]

## 2021-04-23 ENCOUNTER — TELEPHONE (OUTPATIENT)
Dept: CARDIOLOGY | Facility: MEDICAL CENTER | Age: 61
End: 2021-04-23

## 2021-04-23 DIAGNOSIS — E78.5 DYSLIPIDEMIA: ICD-10-CM

## 2021-04-23 DIAGNOSIS — E78.5 HYPERLIPIDEMIA, UNSPECIFIED HYPERLIPIDEMIA TYPE: ICD-10-CM

## 2021-04-23 NOTE — TELEPHONE ENCOUNTER
JM    Pt called stating the cost of vascepa is too expensive and would like to know if there is something else he can take instead. Please call Pt back at [phone].    Thank you

## 2021-04-27 RX ORDER — FENOFIBRATE 120 MG/1
120 TABLET ORAL DAILY
Qty: 90 TABLET | Refills: 3 | Status: SHIPPED | OUTPATIENT
Start: 2021-04-27 | End: 2022-06-02

## 2021-04-27 NOTE — TELEPHONE ENCOUNTER
"Called and spoke to the patient and relayed ELIOT FALLON recommendations.  He stated \"I hope its not like 100 dollars\".  I advised if it is too expensive please call and let us know. Confirmed preferred pharmacy.      RX ordered.   "

## 2021-04-30 ENCOUNTER — TELEPHONE (OUTPATIENT)
Dept: CARDIOLOGY | Facility: MEDICAL CENTER | Age: 61
End: 2021-04-30

## 2021-04-30 NOTE — TELEPHONE ENCOUNTER
Severo Coleman Key: [id number] - PA Case ID: [id number] - Rx #: [id number] Need help? Call us at [phone]   Status   Sent to [employer]   DrugFenofibrate 120MG tablets   [employer] Electronic Prior Authorization Request Form   Original Claim Info[address] ST Req`d: Step 1 Options: Fenofibrate Tab 48 mg, 54 mg, 145 mg, 160 mg , Gemfibrozil, Fenofibric Acid Drug Requires Prior Authorization

## 2021-05-07 NOTE — TELEPHONE ENCOUNTER
Severo Coleman Key: [id number] - PA Case ID: [id number] - Rx #: [id number] help? Call us at [phone]  Outcome  Deniedtoday  PA Case: [id number], Status: Denied. Notification: Completed.  Drug  Fenofibrate 120MG tablets  Form  [employer] Electronic Prior Authorization Request Form  Original Claim Info  [address] Req`d: Step 1 Options: Fenofibrate Tab 48 mg, 54 mg, 145 mg, 160 mg , Gemfibrozil, Fenofibric Acid Drug Requires Prior Authorization

## 2021-05-10 ENCOUNTER — TELEPHONE (OUTPATIENT)
Dept: CARDIOLOGY | Facility: MEDICAL CENTER | Age: 61
End: 2021-05-10

## 2021-05-10 NOTE — TELEPHONE ENCOUNTER
Per chart review, he could not tolerate the combination of max statin with Zetia because of renal impairment so zetia has to be stopped by nephrologist in 2016.   His cholesterol was suboptimally controlled on maximum dose of rosuvastatin in 2017 so PCSK9 was initiated.   His LDL came down enough with the max rosuvastatin in 2018 however he had elevated LFTs so PCSK9 was again initiated and has been placed on fenofibrate.

## 2021-05-10 NOTE — TELEPHONE ENCOUNTER
Received paper PA request from patients plan for Praluent, upon chart review no documented intolerance to statins and pt is not taking a statin at this time.    To MOHINI Escudero covering JM.

## 2021-05-13 ENCOUNTER — HOSPITAL ENCOUNTER (OUTPATIENT)
Dept: LAB | Facility: MEDICAL CENTER | Age: 61
End: 2021-05-13
Attending: FAMILY MEDICINE
Payer: COMMERCIAL

## 2021-05-13 LAB
ALBUMIN SERPL BCP-MCNC: 4.2 G/DL (ref 3.2–4.9)
ALBUMIN/GLOB SERPL: 1.4 G/DL
ALP SERPL-CCNC: 123 U/L (ref 30–99)
ALT SERPL-CCNC: 61 U/L (ref 2–50)
ANION GAP SERPL CALC-SCNC: 11 MMOL/L (ref 7–16)
AST SERPL-CCNC: 51 U/L (ref 12–45)
BASOPHILS # BLD AUTO: 0.8 % (ref 0–1.8)
BASOPHILS # BLD: 0.07 K/UL (ref 0–0.12)
BILIRUB SERPL-MCNC: 0.8 MG/DL (ref 0.1–1.5)
BUN SERPL-MCNC: 33 MG/DL (ref 8–22)
CALCIUM SERPL-MCNC: 9.9 MG/DL (ref 8.5–10.5)
CHLORIDE SERPL-SCNC: 103 MMOL/L (ref 96–112)
CHOLEST SERPL-MCNC: 147 MG/DL (ref 100–199)
CO2 SERPL-SCNC: 21 MMOL/L (ref 20–33)
CREAT SERPL-MCNC: 1.61 MG/DL (ref 0.5–1.4)
EOSINOPHIL # BLD AUTO: 0.2 K/UL (ref 0–0.51)
EOSINOPHIL NFR BLD: 2.2 % (ref 0–6.9)
ERYTHROCYTE [DISTWIDTH] IN BLOOD BY AUTOMATED COUNT: 46.5 FL (ref 35.9–50)
EST. AVERAGE GLUCOSE BLD GHB EST-MCNC: 189 MG/DL
FASTING STATUS PATIENT QL REPORTED: NORMAL
GLOBULIN SER CALC-MCNC: 2.9 G/DL (ref 1.9–3.5)
GLUCOSE SERPL-MCNC: 123 MG/DL (ref 65–99)
HBA1C MFR BLD: 8.2 % (ref 4–5.6)
HCT VFR BLD AUTO: 60.6 % (ref 42–52)
HDLC SERPL-MCNC: 28 MG/DL
HGB BLD-MCNC: 19.9 G/DL (ref 14–18)
IMM GRANULOCYTES # BLD AUTO: 0.23 K/UL (ref 0–0.11)
IMM GRANULOCYTES NFR BLD AUTO: 2.5 % (ref 0–0.9)
LDLC SERPL CALC-MCNC: 61 MG/DL
LYMPHOCYTES # BLD AUTO: 1.39 K/UL (ref 1–4.8)
LYMPHOCYTES NFR BLD: 15 % (ref 22–41)
MCH RBC QN AUTO: 28.3 PG (ref 27–33)
MCHC RBC AUTO-ENTMCNC: 32.8 G/DL (ref 33.7–35.3)
MCV RBC AUTO: 86.3 FL (ref 81.4–97.8)
MONOCYTES # BLD AUTO: 1.13 K/UL (ref 0–0.85)
MONOCYTES NFR BLD AUTO: 12.2 % (ref 0–13.4)
NEUTROPHILS # BLD AUTO: 6.27 K/UL (ref 1.82–7.42)
NEUTROPHILS NFR BLD: 67.3 % (ref 44–72)
NRBC # BLD AUTO: 0 K/UL
NRBC BLD-RTO: 0 /100 WBC
PLATELET # BLD AUTO: 160 K/UL (ref 164–446)
PMV BLD AUTO: 10.2 FL (ref 9–12.9)
POTASSIUM SERPL-SCNC: 4.4 MMOL/L (ref 3.6–5.5)
PROT SERPL-MCNC: 7.1 G/DL (ref 6–8.2)
RBC # BLD AUTO: 7.02 M/UL (ref 4.7–6.1)
SODIUM SERPL-SCNC: 135 MMOL/L (ref 135–145)
TRIGL SERPL-MCNC: 291 MG/DL (ref 0–149)
TSH SERPL DL<=0.005 MIU/L-ACNC: 3.74 UIU/ML (ref 0.38–5.33)
WBC # BLD AUTO: 9.3 K/UL (ref 4.8–10.8)

## 2021-05-13 PROCEDURE — 80061 LIPID PANEL: CPT

## 2021-05-13 PROCEDURE — 85025 COMPLETE CBC W/AUTO DIFF WBC: CPT

## 2021-05-13 PROCEDURE — 80053 COMPREHEN METABOLIC PANEL: CPT

## 2021-05-13 PROCEDURE — 84443 ASSAY THYROID STIM HORMONE: CPT

## 2021-05-13 PROCEDURE — 83036 HEMOGLOBIN GLYCOSYLATED A1C: CPT

## 2021-05-13 PROCEDURE — 36415 COLL VENOUS BLD VENIPUNCTURE: CPT

## 2021-08-23 ENCOUNTER — HOSPITAL ENCOUNTER (OUTPATIENT)
Dept: LAB | Facility: MEDICAL CENTER | Age: 61
End: 2021-08-23
Attending: FAMILY MEDICINE
Payer: COMMERCIAL

## 2021-08-23 LAB
25(OH)D3 SERPL-MCNC: 51 NG/ML (ref 30–100)
ALBUMIN SERPL BCP-MCNC: 4.4 G/DL (ref 3.2–4.9)
ALBUMIN/GLOB SERPL: 1.6 G/DL
ALP SERPL-CCNC: 126 U/L (ref 30–99)
ALT SERPL-CCNC: 64 U/L (ref 2–50)
ANION GAP SERPL CALC-SCNC: 14 MMOL/L (ref 7–16)
APPEARANCE UR: CLEAR
AST SERPL-CCNC: 51 U/L (ref 12–45)
BACTERIA #/AREA URNS HPF: NEGATIVE /HPF
BASOPHILS # BLD AUTO: 0.5 % (ref 0–1.8)
BASOPHILS # BLD: 0.04 K/UL (ref 0–0.12)
BILIRUB SERPL-MCNC: 0.4 MG/DL (ref 0.1–1.5)
BILIRUB UR QL STRIP.AUTO: NEGATIVE
BUN SERPL-MCNC: 31 MG/DL (ref 8–22)
CALCIUM SERPL-MCNC: 9.5 MG/DL (ref 8.5–10.5)
CHLORIDE SERPL-SCNC: 104 MMOL/L (ref 96–112)
CHOLEST SERPL-MCNC: 165 MG/DL (ref 100–199)
CO2 SERPL-SCNC: 20 MMOL/L (ref 20–33)
COLOR UR: YELLOW
CREAT SERPL-MCNC: 1.58 MG/DL (ref 0.5–1.4)
CREAT UR-MCNC: 82.14 MG/DL
EOSINOPHIL # BLD AUTO: 0.2 K/UL (ref 0–0.51)
EOSINOPHIL NFR BLD: 2.5 % (ref 0–6.9)
EPI CELLS #/AREA URNS HPF: NEGATIVE /HPF
ERYTHROCYTE [DISTWIDTH] IN BLOOD BY AUTOMATED COUNT: 47.2 FL (ref 35.9–50)
EST. AVERAGE GLUCOSE BLD GHB EST-MCNC: 186 MG/DL
FASTING STATUS PATIENT QL REPORTED: NORMAL
GLOBULIN SER CALC-MCNC: 2.8 G/DL (ref 1.9–3.5)
GLUCOSE SERPL-MCNC: 156 MG/DL (ref 65–99)
GLUCOSE UR STRIP.AUTO-MCNC: >=1000 MG/DL
HBA1C MFR BLD: 8.1 % (ref 4–5.6)
HCT VFR BLD AUTO: 62.5 % (ref 42–52)
HDLC SERPL-MCNC: 27 MG/DL
HGB BLD-MCNC: 20.1 G/DL (ref 14–18)
HYALINE CASTS #/AREA URNS LPF: ABNORMAL /LPF
IMM GRANULOCYTES # BLD AUTO: 0.2 K/UL (ref 0–0.11)
IMM GRANULOCYTES NFR BLD AUTO: 2.5 % (ref 0–0.9)
KETONES UR STRIP.AUTO-MCNC: NEGATIVE MG/DL
LDLC SERPL CALC-MCNC: 66 MG/DL
LEUKOCYTE ESTERASE UR QL STRIP.AUTO: NEGATIVE
LYMPHOCYTES # BLD AUTO: 1.27 K/UL (ref 1–4.8)
LYMPHOCYTES NFR BLD: 15.6 % (ref 22–41)
MCH RBC QN AUTO: 28.5 PG (ref 27–33)
MCHC RBC AUTO-ENTMCNC: 32.2 G/DL (ref 33.7–35.3)
MCV RBC AUTO: 88.5 FL (ref 81.4–97.8)
MICRO URNS: ABNORMAL
MICROALBUMIN UR-MCNC: 98.5 MG/DL
MICROALBUMIN/CREAT UR: 1199 MG/G (ref 0–30)
MONOCYTES # BLD AUTO: 0.77 K/UL (ref 0–0.85)
MONOCYTES NFR BLD AUTO: 9.5 % (ref 0–13.4)
NEUTROPHILS # BLD AUTO: 5.66 K/UL (ref 1.82–7.42)
NEUTROPHILS NFR BLD: 69.4 % (ref 44–72)
NITRITE UR QL STRIP.AUTO: NEGATIVE
NRBC # BLD AUTO: 0 K/UL
NRBC BLD-RTO: 0 /100 WBC
PH UR STRIP.AUTO: 6 [PH] (ref 5–8)
PLATELET # BLD AUTO: 172 K/UL (ref 164–446)
PMV BLD AUTO: 10.2 FL (ref 9–12.9)
POTASSIUM SERPL-SCNC: 4.7 MMOL/L (ref 3.6–5.5)
PROT SERPL-MCNC: 7.2 G/DL (ref 6–8.2)
PROT UR QL STRIP: 300 MG/DL
RBC # BLD AUTO: 7.06 M/UL (ref 4.7–6.1)
RBC # URNS HPF: ABNORMAL /HPF
RBC UR QL AUTO: ABNORMAL
SODIUM SERPL-SCNC: 138 MMOL/L (ref 135–145)
SP GR UR STRIP.AUTO: 1.02
TRIGL SERPL-MCNC: 358 MG/DL (ref 0–149)
TSH SERPL DL<=0.005 MIU/L-ACNC: 2.16 UIU/ML (ref 0.38–5.33)
UROBILINOGEN UR STRIP.AUTO-MCNC: 0.2 MG/DL
WBC # BLD AUTO: 8.1 K/UL (ref 4.8–10.8)
WBC #/AREA URNS HPF: ABNORMAL /HPF

## 2021-08-23 PROCEDURE — 82570 ASSAY OF URINE CREATININE: CPT

## 2021-08-23 PROCEDURE — 81001 URINALYSIS AUTO W/SCOPE: CPT

## 2021-08-23 PROCEDURE — 82043 UR ALBUMIN QUANTITATIVE: CPT

## 2021-08-23 PROCEDURE — 85025 COMPLETE CBC W/AUTO DIFF WBC: CPT

## 2021-08-23 PROCEDURE — 80053 COMPREHEN METABOLIC PANEL: CPT

## 2021-08-23 PROCEDURE — 84443 ASSAY THYROID STIM HORMONE: CPT

## 2021-08-23 PROCEDURE — 82306 VITAMIN D 25 HYDROXY: CPT

## 2021-08-23 PROCEDURE — 36415 COLL VENOUS BLD VENIPUNCTURE: CPT

## 2021-08-23 PROCEDURE — 80061 LIPID PANEL: CPT

## 2021-08-23 PROCEDURE — 83036 HEMOGLOBIN GLYCOSYLATED A1C: CPT

## 2021-10-18 ENCOUNTER — HOSPITAL ENCOUNTER (OUTPATIENT)
Facility: MEDICAL CENTER | Age: 61
End: 2021-10-18
Attending: INTERNAL MEDICINE
Payer: COMMERCIAL

## 2021-10-18 PROCEDURE — 82668 ASSAY OF ERYTHROPOIETIN: CPT

## 2021-10-20 LAB — EPO SERPL-ACNC: 6 MU/ML (ref 4–27)

## 2021-11-22 ENCOUNTER — HOSPITAL ENCOUNTER (OUTPATIENT)
Dept: LAB | Facility: MEDICAL CENTER | Age: 61
End: 2021-11-22
Attending: FAMILY MEDICINE
Payer: COMMERCIAL

## 2021-11-22 LAB
ALBUMIN SERPL BCP-MCNC: 4 G/DL (ref 3.2–4.9)
ALBUMIN/GLOB SERPL: 1.5 G/DL
ALP SERPL-CCNC: 119 U/L (ref 30–99)
ALT SERPL-CCNC: 56 U/L (ref 2–50)
ANION GAP SERPL CALC-SCNC: 14 MMOL/L (ref 7–16)
APPEARANCE UR: CLEAR
AST SERPL-CCNC: 41 U/L (ref 12–45)
BACTERIA #/AREA URNS HPF: NEGATIVE /HPF
BASOPHILS # BLD AUTO: 0.8 % (ref 0–1.8)
BASOPHILS # BLD: 0.06 K/UL (ref 0–0.12)
BILIRUB SERPL-MCNC: 1 MG/DL (ref 0.1–1.5)
BILIRUB UR QL STRIP.AUTO: NEGATIVE
BUN SERPL-MCNC: 28 MG/DL (ref 8–22)
CALCIUM SERPL-MCNC: 9.3 MG/DL (ref 8.4–10.2)
CHLORIDE SERPL-SCNC: 107 MMOL/L (ref 96–112)
CHOLEST SERPL-MCNC: 160 MG/DL (ref 100–199)
CO2 SERPL-SCNC: 18 MMOL/L (ref 20–33)
COLOR UR: YELLOW
CREAT SERPL-MCNC: 1.53 MG/DL (ref 0.5–1.4)
CREAT UR-MCNC: 81.04 MG/DL
EOSINOPHIL # BLD AUTO: 0.14 K/UL (ref 0–0.51)
EOSINOPHIL NFR BLD: 1.8 % (ref 0–6.9)
EPI CELLS #/AREA URNS HPF: NEGATIVE /HPF
ERYTHROCYTE [DISTWIDTH] IN BLOOD BY AUTOMATED COUNT: 47.3 FL (ref 35.9–50)
EST. AVERAGE GLUCOSE BLD GHB EST-MCNC: 174 MG/DL
FASTING STATUS PATIENT QL REPORTED: NORMAL
GLOBULIN SER CALC-MCNC: 2.6 G/DL (ref 1.9–3.5)
GLUCOSE SERPL-MCNC: 145 MG/DL (ref 65–99)
GLUCOSE UR STRIP.AUTO-MCNC: 500 MG/DL
HBA1C MFR BLD: 7.7 % (ref 4–5.6)
HCT VFR BLD AUTO: 62.1 % (ref 42–52)
HDLC SERPL-MCNC: 31 MG/DL
HGB BLD-MCNC: 19.8 G/DL (ref 14–18)
HYALINE CASTS #/AREA URNS LPF: ABNORMAL /LPF
IMM GRANULOCYTES # BLD AUTO: 0.13 K/UL (ref 0–0.11)
IMM GRANULOCYTES NFR BLD AUTO: 1.7 % (ref 0–0.9)
KETONES UR STRIP.AUTO-MCNC: NEGATIVE MG/DL
LDLC SERPL CALC-MCNC: 83 MG/DL
LEUKOCYTE ESTERASE UR QL STRIP.AUTO: NEGATIVE
LYMPHOCYTES # BLD AUTO: 0.96 K/UL (ref 1–4.8)
LYMPHOCYTES NFR BLD: 12.6 % (ref 22–41)
MCH RBC QN AUTO: 28 PG (ref 27–33)
MCHC RBC AUTO-ENTMCNC: 31.9 G/DL (ref 33.7–35.3)
MCV RBC AUTO: 88 FL (ref 81.4–97.8)
MICRO URNS: ABNORMAL
MICROALBUMIN UR-MCNC: 118.1 MG/DL
MICROALBUMIN/CREAT UR: 1457 MG/G (ref 0–30)
MONOCYTES # BLD AUTO: 0.69 K/UL (ref 0–0.85)
MONOCYTES NFR BLD AUTO: 9.1 % (ref 0–13.4)
MUCOUS THREADS #/AREA URNS HPF: ABNORMAL /HPF
NEUTROPHILS # BLD AUTO: 5.63 K/UL (ref 1.82–7.42)
NEUTROPHILS NFR BLD: 74 % (ref 44–72)
NITRITE UR QL STRIP.AUTO: NEGATIVE
NRBC # BLD AUTO: 0 K/UL
NRBC BLD-RTO: 0 /100 WBC
PH UR STRIP.AUTO: 6 [PH] (ref 5–8)
PLATELET # BLD AUTO: 150 K/UL (ref 164–446)
PMV BLD AUTO: 10.4 FL (ref 9–12.9)
POTASSIUM SERPL-SCNC: 4.6 MMOL/L (ref 3.6–5.5)
PROT SERPL-MCNC: 6.6 G/DL (ref 6–8.2)
PROT UR QL STRIP: 100 MG/DL
RBC # BLD AUTO: 7.06 M/UL (ref 4.7–6.1)
RBC # URNS HPF: ABNORMAL /HPF
RBC UR QL AUTO: ABNORMAL
SODIUM SERPL-SCNC: 139 MMOL/L (ref 135–145)
SP GR UR STRIP.AUTO: 1.02
TRIGL SERPL-MCNC: 230 MG/DL (ref 0–149)
TSH SERPL DL<=0.005 MIU/L-ACNC: 2.09 UIU/ML (ref 0.38–5.33)
WBC # BLD AUTO: 7.6 K/UL (ref 4.8–10.8)
WBC #/AREA URNS HPF: ABNORMAL /HPF

## 2021-11-22 PROCEDURE — 81001 URINALYSIS AUTO W/SCOPE: CPT

## 2021-11-22 PROCEDURE — 82043 UR ALBUMIN QUANTITATIVE: CPT

## 2021-11-22 PROCEDURE — 85025 COMPLETE CBC W/AUTO DIFF WBC: CPT

## 2021-11-22 PROCEDURE — 82570 ASSAY OF URINE CREATININE: CPT

## 2021-11-22 PROCEDURE — 80053 COMPREHEN METABOLIC PANEL: CPT

## 2021-11-22 PROCEDURE — 84443 ASSAY THYROID STIM HORMONE: CPT

## 2021-11-22 PROCEDURE — 36415 COLL VENOUS BLD VENIPUNCTURE: CPT

## 2021-11-22 PROCEDURE — 83036 HEMOGLOBIN GLYCOSYLATED A1C: CPT

## 2021-11-22 PROCEDURE — 80061 LIPID PANEL: CPT

## 2021-11-23 DIAGNOSIS — E78.5 HYPERLIPIDEMIA, UNSPECIFIED HYPERLIPIDEMIA TYPE: ICD-10-CM

## 2021-11-23 DIAGNOSIS — E78.5 DYSLIPIDEMIA: ICD-10-CM

## 2021-11-24 NOTE — TELEPHONE ENCOUNTER
Received paper refill request for medication.   RX refilled and sent to [facility].

## 2022-01-06 ENCOUNTER — TELEPHONE (OUTPATIENT)
Dept: SCHEDULING | Facility: IMAGING CENTER | Age: 62
End: 2022-01-06

## 2022-01-06 DIAGNOSIS — E78.5 DYSLIPIDEMIA: ICD-10-CM

## 2022-01-06 NOTE — TELEPHONE ENCOUNTER
ELIOT    Pt called stating his insurance is changing his medication from praluent to rapatha and needs a new prescription sent to [facility]. Pt is out of medication. If any questions please call Pt back at [phone].    Thank you

## 2022-01-10 ENCOUNTER — TELEPHONE (OUTPATIENT)
Dept: CARDIOLOGY | Facility: MEDICAL CENTER | Age: 62
End: 2022-01-10

## 2022-01-10 DIAGNOSIS — E78.5 DYSLIPIDEMIA: ICD-10-CM

## 2022-01-10 NOTE — TELEPHONE ENCOUNTER
Spoke to JM and he stated Repatha 140mg every 14 days.     RX ordered, see refill encounter.   Updated medication list.     Called mobile number and spoke to the patient, confirmed pharmacy and advised he will need a FV around April. He stated he could make an appt now and patient was successfully transferred to the schedulers.

## 2022-01-10 NOTE — TELEPHONE ENCOUNTER
----- Message from Dahiana Gama sent at 1/10/2022  9:51 AM PST -----  Regarding: INSURANCE PREFERRED PRESCRIPTION  Duran,     I scanned in Preferred Prescriptions for this patient and the highlighted is what he needs a prescription for, Repatha.     Patient also gave me a fax number of [phone] to send to.     Thanks,     Dahiana

## 2022-02-28 ENCOUNTER — HOSPITAL ENCOUNTER (OUTPATIENT)
Dept: LAB | Facility: MEDICAL CENTER | Age: 62
End: 2022-02-28
Attending: FAMILY MEDICINE
Payer: COMMERCIAL

## 2022-02-28 LAB
25(OH)D3 SERPL-MCNC: 51 NG/ML (ref 30–100)
ALBUMIN SERPL BCP-MCNC: 4.1 G/DL (ref 3.2–4.9)
ALBUMIN/GLOB SERPL: 1.4 G/DL
ALP SERPL-CCNC: 119 U/L (ref 30–99)
ALT SERPL-CCNC: 48 U/L (ref 2–50)
ANION GAP SERPL CALC-SCNC: 10 MMOL/L (ref 7–16)
APPEARANCE UR: CLEAR
AST SERPL-CCNC: 42 U/L (ref 12–45)
BASOPHILS # BLD AUTO: 0.7 % (ref 0–1.8)
BASOPHILS # BLD: 0.06 K/UL (ref 0–0.12)
BILIRUB SERPL-MCNC: 0.9 MG/DL (ref 0.1–1.5)
BILIRUB UR QL STRIP.AUTO: NEGATIVE
BUN SERPL-MCNC: 22 MG/DL (ref 8–22)
CALCIUM SERPL-MCNC: 9.3 MG/DL (ref 8.4–10.2)
CHLORIDE SERPL-SCNC: 106 MMOL/L (ref 96–112)
CHOLEST SERPL-MCNC: 188 MG/DL (ref 100–199)
CO2 SERPL-SCNC: 23 MMOL/L (ref 20–33)
COLOR UR: YELLOW
CREAT SERPL-MCNC: 1.21 MG/DL (ref 0.5–1.4)
CREAT UR-MCNC: 82.34 MG/DL
EOSINOPHIL # BLD AUTO: 0.23 K/UL (ref 0–0.51)
EOSINOPHIL NFR BLD: 2.7 % (ref 0–6.9)
EPI CELLS #/AREA URNS HPF: ABNORMAL /HPF
ERYTHROCYTE [DISTWIDTH] IN BLOOD BY AUTOMATED COUNT: 50 FL (ref 35.9–50)
EST. AVERAGE GLUCOSE BLD GHB EST-MCNC: 169 MG/DL
FASTING STATUS PATIENT QL REPORTED: NORMAL
GLOBULIN SER CALC-MCNC: 3 G/DL (ref 1.9–3.5)
GLUCOSE SERPL-MCNC: 131 MG/DL (ref 65–99)
GLUCOSE UR STRIP.AUTO-MCNC: >=1000 MG/DL
HBA1C MFR BLD: 7.5 % (ref 4–5.6)
HCT VFR BLD AUTO: 61.8 % (ref 42–52)
HDLC SERPL-MCNC: 29 MG/DL
HGB BLD-MCNC: 19.6 G/DL (ref 14–18)
HYALINE CASTS #/AREA URNS LPF: ABNORMAL /LPF
IMM GRANULOCYTES # BLD AUTO: 0.16 K/UL (ref 0–0.11)
IMM GRANULOCYTES NFR BLD AUTO: 1.9 % (ref 0–0.9)
KETONES UR STRIP.AUTO-MCNC: NEGATIVE MG/DL
LDLC SERPL CALC-MCNC: 114 MG/DL
LEUKOCYTE ESTERASE UR QL STRIP.AUTO: NEGATIVE
LYMPHOCYTES # BLD AUTO: 1.31 K/UL (ref 1–4.8)
LYMPHOCYTES NFR BLD: 15.3 % (ref 22–41)
MCH RBC QN AUTO: 28 PG (ref 27–33)
MCHC RBC AUTO-ENTMCNC: 31.7 G/DL (ref 33.7–35.3)
MCV RBC AUTO: 88.3 FL (ref 81.4–97.8)
MICRO URNS: ABNORMAL
MICROALBUMIN UR-MCNC: 87.7 MG/DL
MICROALBUMIN/CREAT UR: 1065 MG/G (ref 0–30)
MONOCYTES # BLD AUTO: 0.87 K/UL (ref 0–0.85)
MONOCYTES NFR BLD AUTO: 10.2 % (ref 0–13.4)
MUCOUS THREADS #/AREA URNS HPF: ABNORMAL /HPF
NEUTROPHILS # BLD AUTO: 5.94 K/UL (ref 1.82–7.42)
NEUTROPHILS NFR BLD: 69.2 % (ref 44–72)
NITRITE UR QL STRIP.AUTO: NEGATIVE
NRBC # BLD AUTO: 0 K/UL
NRBC BLD-RTO: 0 /100 WBC
PH UR STRIP.AUTO: 6 [PH] (ref 5–8)
PLATELET # BLD AUTO: 135 K/UL (ref 164–446)
PMV BLD AUTO: 10.4 FL (ref 9–12.9)
POTASSIUM SERPL-SCNC: 5 MMOL/L (ref 3.6–5.5)
PROT SERPL-MCNC: 7.1 G/DL (ref 6–8.2)
PROT UR QL STRIP: 100 MG/DL
RBC # BLD AUTO: 7 M/UL (ref 4.7–6.1)
RBC # URNS HPF: ABNORMAL /HPF
RBC UR QL AUTO: ABNORMAL
SODIUM SERPL-SCNC: 139 MMOL/L (ref 135–145)
SP GR UR STRIP.AUTO: 1.02
TRIGL SERPL-MCNC: 225 MG/DL (ref 0–149)
TSH SERPL DL<=0.005 MIU/L-ACNC: 2.03 UIU/ML (ref 0.38–5.33)
WBC # BLD AUTO: 8.6 K/UL (ref 4.8–10.8)
WBC #/AREA URNS HPF: ABNORMAL /HPF

## 2022-02-28 PROCEDURE — 82306 VITAMIN D 25 HYDROXY: CPT

## 2022-02-28 PROCEDURE — 83036 HEMOGLOBIN GLYCOSYLATED A1C: CPT

## 2022-02-28 PROCEDURE — 36415 COLL VENOUS BLD VENIPUNCTURE: CPT

## 2022-02-28 PROCEDURE — 82043 UR ALBUMIN QUANTITATIVE: CPT

## 2022-02-28 PROCEDURE — 80053 COMPREHEN METABOLIC PANEL: CPT

## 2022-02-28 PROCEDURE — 81001 URINALYSIS AUTO W/SCOPE: CPT

## 2022-02-28 PROCEDURE — 85025 COMPLETE CBC W/AUTO DIFF WBC: CPT

## 2022-02-28 PROCEDURE — 80061 LIPID PANEL: CPT

## 2022-02-28 PROCEDURE — 82570 ASSAY OF URINE CREATININE: CPT

## 2022-02-28 PROCEDURE — 84443 ASSAY THYROID STIM HORMONE: CPT

## 2022-03-18 DIAGNOSIS — E78.5 DYSLIPIDEMIA: ICD-10-CM

## 2022-03-22 RX ORDER — EVOLOCUMAB 140 MG/ML
INJECTION, SOLUTION SUBCUTANEOUS
Qty: 6 EACH | Refills: 0 | Status: SHIPPED | OUTPATIENT
Start: 2022-03-22 | End: 2022-06-02

## 2022-05-04 DIAGNOSIS — I25.119 CORONARY ARTERY DISEASE INVOLVING NATIVE CORONARY ARTERY OF NATIVE HEART WITH ANGINA PECTORIS (HCC): ICD-10-CM

## 2022-05-04 DIAGNOSIS — I10 ESSENTIAL HYPERTENSION: ICD-10-CM

## 2022-05-06 RX ORDER — ATENOLOL 25 MG/1
25 TABLET ORAL DAILY
Qty: 90 TABLET | Refills: 0 | Status: SHIPPED | OUTPATIENT
Start: 2022-05-06 | End: 2022-06-02

## 2022-05-06 RX ORDER — LOSARTAN POTASSIUM 50 MG/1
50 TABLET ORAL
Qty: 90 TABLET | Refills: 0 | Status: SHIPPED | OUTPATIENT
Start: 2022-05-06 | End: 2022-06-02

## 2022-05-18 ENCOUNTER — HOSPITAL ENCOUNTER (OUTPATIENT)
Dept: LAB | Facility: MEDICAL CENTER | Age: 62
End: 2022-05-18
Attending: PHYSICIAN ASSISTANT
Payer: COMMERCIAL

## 2022-05-18 PROCEDURE — 36415 COLL VENOUS BLD VENIPUNCTURE: CPT

## 2022-05-18 PROCEDURE — 84153 ASSAY OF PSA TOTAL: CPT

## 2022-05-19 LAB — PSA SERPL-MCNC: 0.34 NG/ML (ref 0–4)

## 2022-06-02 ENCOUNTER — OFFICE VISIT (OUTPATIENT)
Dept: CARDIOLOGY | Facility: MEDICAL CENTER | Age: 62
End: 2022-06-02
Payer: COMMERCIAL

## 2022-06-02 VITALS
DIASTOLIC BLOOD PRESSURE: 86 MMHG | OXYGEN SATURATION: 95 % | WEIGHT: 216 LBS | HEIGHT: 65 IN | RESPIRATION RATE: 14 BRPM | SYSTOLIC BLOOD PRESSURE: 124 MMHG | HEART RATE: 54 BPM | BODY MASS INDEX: 35.99 KG/M2

## 2022-06-02 DIAGNOSIS — I25.119 CORONARY ARTERY DISEASE INVOLVING NATIVE CORONARY ARTERY OF NATIVE HEART WITH ANGINA PECTORIS (HCC): ICD-10-CM

## 2022-06-02 DIAGNOSIS — D75.1 POLYCYTHEMIA: ICD-10-CM

## 2022-06-02 DIAGNOSIS — G47.33 OSA (OBSTRUCTIVE SLEEP APNEA): ICD-10-CM

## 2022-06-02 DIAGNOSIS — Z95.1 S/P CABG (CORONARY ARTERY BYPASS GRAFT): Chronic | ICD-10-CM

## 2022-06-02 DIAGNOSIS — I25.10 CORONARY ARTERY DISEASE DUE TO CALCIFIED CORONARY LESION: ICD-10-CM

## 2022-06-02 DIAGNOSIS — D69.6 THROMBOCYTOPENIA (HCC): ICD-10-CM

## 2022-06-02 DIAGNOSIS — E66.9 OBESITY (BMI 30-39.9): ICD-10-CM

## 2022-06-02 DIAGNOSIS — K75.81 STEATOHEPATITIS: ICD-10-CM

## 2022-06-02 DIAGNOSIS — I25.84 CORONARY ARTERY DISEASE DUE TO CALCIFIED CORONARY LESION: ICD-10-CM

## 2022-06-02 DIAGNOSIS — E78.5 DYSLIPIDEMIA: ICD-10-CM

## 2022-06-02 DIAGNOSIS — E11.9 TYPE 2 DIABETES MELLITUS WITHOUT COMPLICATION, WITHOUT LONG-TERM CURRENT USE OF INSULIN (HCC): ICD-10-CM

## 2022-06-02 DIAGNOSIS — I10 ESSENTIAL HYPERTENSION: ICD-10-CM

## 2022-06-02 PROCEDURE — 99214 OFFICE O/P EST MOD 30 MIN: CPT | Performed by: INTERNAL MEDICINE

## 2022-06-02 RX ORDER — DAPAGLIFLOZIN 10 MG/1
TABLET, FILM COATED ORAL
COMMUNITY
Start: 2022-05-04

## 2022-06-02 RX ORDER — EVOLOCUMAB 140 MG/ML
INJECTION, SOLUTION SUBCUTANEOUS
Qty: 6 EACH | Refills: 3 | Status: SHIPPED | OUTPATIENT
Start: 2022-06-02 | End: 2022-06-03 | Stop reason: SDUPTHER

## 2022-06-02 RX ORDER — GLIMEPIRIDE 2 MG/1
2 TABLET ORAL
COMMUNITY
Start: 2022-05-04 | End: 2023-06-13

## 2022-06-02 RX ORDER — EZETIMIBE 10 MG/1
10 TABLET ORAL
COMMUNITY
Start: 2022-05-04 | End: 2022-07-19

## 2022-06-02 RX ORDER — ROSUVASTATIN CALCIUM 10 MG/1
10 TABLET, COATED ORAL EVERY EVENING
Qty: 90 TABLET | Refills: 3 | Status: SHIPPED | OUTPATIENT
Start: 2022-06-02 | End: 2022-06-03 | Stop reason: SDUPTHER

## 2022-06-02 RX ORDER — LOSARTAN POTASSIUM 50 MG/1
50 TABLET ORAL
Qty: 90 TABLET | Refills: 3 | Status: SHIPPED | OUTPATIENT
Start: 2022-06-02 | End: 2022-06-03 | Stop reason: SDUPTHER

## 2022-06-02 RX ORDER — ATENOLOL 25 MG/1
25 TABLET ORAL DAILY
Qty: 90 TABLET | Refills: 3 | Status: SHIPPED | OUTPATIENT
Start: 2022-06-02 | End: 2022-06-03 | Stop reason: SDUPTHER

## 2022-06-02 ASSESSMENT — FIBROSIS 4 INDEX: FIB4 SCORE: 2.69

## 2022-06-02 NOTE — PATIENT INSTRUCTIONS
Please start crestor 10mg once a day for your cholesterol.     Please get fasting blood tests in 4-6 weeks.

## 2022-06-02 NOTE — PROGRESS NOTES
Cardiology Follow-up Consultation Note    Date of note:  6/2/2022  Primary Care Provider: Jacey Elizondo M.D.  Referring Provider: Tez Tena M.D.     Patient Name: Severo Coleman   YOB: 1960  MRN:              [id number]    Chief Complaint: CAD    History of Present Illness: Severo Coleman is a 61 y.o. male whose current medical problems include diabetes, DAVID on CPAP, CAD s/p CABG, hypertension, dyslipidemia who is here for follow-up.    At our visit, 7/2/2020:  Liver enzymes elevated for years, relatively stable.     Started praluent 1 year ago.     At our visit, 4/22/2021:  Got J+J vaccine last week.     In terms of CAD, no angina. Does 20-30 minutes on the treadmill around 3 times a week.     Diabetes worsening.     Interim Events:  Exercising on treadmill 2-3 times a week for 30 minutes.     In terms of hypertension, BP at home in the 120s.     Diabetes stable.     In terms of DAVID, on CPAP.       ROS  Constitution: Negative for chills, fever and night sweats.   HENT: Negative for nosebleeds.    Eyes: Negative for vision loss in left eye and vision loss in right eye.   Respiratory: Negative for hemoptysis.    Gastrointestinal: Negative for hematemesis, hematochezia and melena.   Genitourinary: Negative for hematuria.   Neurological: Negative for focal weakness. Bilateral toe numbness.     All other systems reviewed and discussed using a comprehensive questionnaire and are negative.         Past Medical History:   Diagnosis Date   • CAD (coronary artery disease)    • Congestive heart failure (HCC)    • Heart valve disease    • High cholesterol    • HTN (hypertension) 6/30/2014   • Hyperlipidemia 5/14/2012   • Kidney stone    • Liver disease    • Myocardial infarct (HCC) 1998   • Obesity 5/14/2012   • Renal disorder     kidney stones   • S/P CABG (coronary artery bypass graft) 1998    3 vessel         Past Surgical History:   Procedure Laterality Date   • CARDIAC CATH,  LEFT HEART  2007    open LIMA to LAD and SVG to Diag which was supplying RCA and LCx by collaterals.    • MULTIPLE CORONARY ARTERY BYPASS  1998    3 vessel, per previous cardiology notes (unable to find records of op report), LIMA to LAD, SVG to inferior marginal (occluded), and SVG to Diagonal artery         Current Outpatient Medications   Medication Sig Dispense Refill   • atenolol (TENORMIN) 25 MG Tab TAKE 1 TABLET BY MOUTH EVERY DAY 90 Tablet 0   • losartan (COZAAR) 50 MG Tab TAKE 1 TABLET BY MOUTH EVERY DAY 90 Tablet 0   • REPATHA SURECLICK 140 MG/ML Solution Auto-injector Inject 1 pen (140mg) under the skin every 14 days. 6 Each 0   • FARXIGA 5 MG Tab Take 1 tablet by mouth every day.     • glimepiride (AMARYL) 1 MG tablet Take 1 mg by mouth every day.     • Cholecalciferol (VITAMIN D) 125 MCG (5000 UT) Cap Take 5,000 Units by mouth every day.     • finasteride (PROSCAR) 5 MG Tab Take 5 mg by mouth every day.     • folic acid (FOLVITE) 1 MG Tab Take 2 Tabs by mouth 2 Times a Day. 120 Tab 11   • Pyridoxine HCl (VITAMIN B6 PO) Take  by mouth every day.     • Coenzyme Q10 (CO Q-10 PO) Take  by mouth every day.     • cyanocobalamin (VITAMIN B-12) 100 MCG TABS Take 100 mcg by mouth every day.       • aspirin 81 MG tablet Take 81 mg by mouth every day.       No current facility-administered medications for this visit.         No Known Allergies      Family History   Problem Relation Age of Onset   • Heart Attack Mother    • Sleep Apnea Brother          Social History     Socioeconomic History   • Marital status: Single     Spouse name: Not on file   • Number of children: Not on file   • Years of education: Not on file   • Highest education level: Not on file   Occupational History   • Not on file   Tobacco Use   • Smoking status: Never Smoker   • Smokeless tobacco: Never Used   Vaping Use   • Vaping Use: Never used   Substance and Sexual Activity   • Alcohol use: No   • Drug use: No   • Sexual activity: Not on file  "  Other Topics Concern   • Not on file   Social History Narrative    Works at [employer].      Social Determinants of Health     Financial Resource Strain: Not on file   Food Insecurity: Not on file   Transportation Needs: Not on file   Physical Activity: Not on file   Stress: Not on file   Social Connections: Not on file   Intimate Partner Violence: Not on file   Housing Stability: Not on file         Physical Exam:  Ambulatory Vitals  /86 (BP Location: Left arm, Patient Position: Sitting, BP Cuff Size: Adult)   Pulse (!) 54   Resp 14   Ht 1.651 m (5' 5\")   Wt 98 kg (216 lb)   SpO2 95%    Oxygen Therapy:  Pulse Oximetry: 95 %  BP Readings from Last 4 Encounters:   06/02/22 124/86   04/22/21 110/62   08/10/20 126/62   07/02/20 136/86       Weight/BMI: Body mass index is 35.94 kg/m².  Wt Readings from Last 4 Encounters:   06/02/22 98 kg (216 lb)   04/22/21 98 kg (216 lb 0.8 oz)   08/10/20 96.6 kg (213 lb)   07/02/20 97.5 kg (215 lb)     General: No apparent distress  Eyes: nl conjunctiva  ENT: OP covered by mask  Neck: JVP <8 cm H2O  Lungs: normal respiratory effort, CTAB  Heart: RRR, no murmurs, no rubs or gallops, 1+ edema bilateral lower extremities. No LV/RV heave on cardiac palpatation. 2+ bilateral radial pulses.   Abdomen: soft, non tender, non distended, no masses, normal bowel sounds.  No HSM.  Extremities/MSK: no clubbing, no cyanosis  Neurological: No focal sensory deficits  Psychiatric: Appropriate affect, A/O x 3, intact judgement and insight  Skin: Warm extremities    Exam repeated in full and unchanged except for as noted above.        Lab Data Review:  Lab Results   Component Value Date/Time    CHOLSTRLTOT 188 02/28/2022 09:35 AM     (H) 02/28/2022 09:35 AM    HDL 29 (A) 02/28/2022 09:35 AM    TRIGLYCERIDE 225 (H) 02/28/2022 09:35 AM       Lab Results   Component Value Date/Time    SODIUM 139 02/28/2022 09:35 AM    POTASSIUM 5.0 02/28/2022 09:35 AM    CHLORIDE 106 02/28/2022 09:35 AM "    CO2 23 02/28/2022 09:35 AM    GLUCOSE 131 (H) 02/28/2022 09:35 AM    BUN 22 02/28/2022 09:35 AM    CREATININE 1.21 02/28/2022 09:35 AM     Lab Results   Component Value Date/Time    ALKPHOSPHAT 119 (H) 02/28/2022 09:35 AM    ASTSGOT 42 02/28/2022 09:35 AM    ALTSGPT 48 02/28/2022 09:35 AM    TBILIRUBIN 0.9 02/28/2022 09:35 AM      Lab Results   Component Value Date/Time    WBC 8.6 02/28/2022 09:35 AM     No components found for: HBGA1C  No components found for: TROPONIN  No results found for: BNP      Cardiac Imaging and Procedures Review:    EKG dated 7/2/2020 : My personal interpretation is sinus bradycardia, previously large anteroseptal-lateral infarct, LAD, TOMAS, LVH. Posterior axis.       Echo dated 7/28/2020:  CONCLUSIONS  Normal right and left ventricular size and function.   Mild concentric left ventricular hypertrophy.  Wall motion not well visualized however appeared grossly normal.  No significant valvular stenosis or regurgitation.   Normal estiamted right atrial pressure.  Unable to estimate pulmonary artery pressure due to an inadequate   tricuspid regurgitant jet.     Compared to the previous echocardiogram performed on 08/26/2013: There   has been no significant change.     Normal left ventricular chamber size. Mild concentric left ventricular   hypertrophy. Left ventricular ejection fraction is visually estimated   to be 60%. Wall motion not well visualized however appeared grossly   normal. Normal diastolic function.      Echo dated 2013:   CONCLUSIONS   Normal left ventricular systolic function.   Mild to moderate concentric left ventricular hypertrophy.   Left ventricular ejection fraction is 55% to 60%.   No significant valve abnormalities.     Radiology test Review:  CXR: 2015  FINDINGS:  The patient is status post CABG. The cardiac silhouette is enlarged.  No pulmonary infiltrates or consolidations are noted.  No pleural effusions are appreciated.    Liver ultrasound  2016:  IMPRESSION:     1.  Cholelithiasis.  2.  Increased liver echogenicity consistent with hepatic steatosis.  3.  8 mm cyst in left lobe of liver.  4.  3.2 cm cyst in the right kidney.    Liver Biopsy 2016:  A. Liver Biopsy:          Benign liver tissue showing moderate fatty change (approximately           35% of hepatocytes show steatosis).          The lobules also show mild lobular chronic inflammation, slight           acute inflammation and rare degenerated hepatocytes.          The portal tracts show mild chronic inflammation, minimal acute           inflammation and rare eosinophils; the bile ducts are           unremarkable except for one duct which shows focal acute           inflammation of its wall.          The trichrome and reticulin stains do not show significant           fibrosis or architectural distortion.          The iron stain does not demonstrate increased iron deposition     Comment: The findings fit for steatohepatitis without significant   fibrosis.  Drugs and toxins are possible causes.  Clinical correlation   is suggested.  Clinical information from Dr. Hou's office has also   been reviewed in conjunction with the biopsy.       Medical Decision Makin. Coronary artery disease due to a calcified coronary lesion: Status post CABG x3 in .  Occluded SVG to OM with a patent LIMA to the LAD in   Per notes, appears LIMA and SVG to diag are open, however other notes say only LIMA is open. Currently asymptomatic, however EKG showed quite extensive anterolateral infarct. Wall motion and LVEF were grossly normal 2020.   -continue aspirin 81mg PO Daily  -continue repatha  -continue atenolol, will consider switching to metoprolol in the future.     2. Dyslipidemia  Continue repatha as per above. Add crestor 10mg PO Daily check lipids in 2 months. Started on PCSK9 inhibitor prior to me being his cardiologist, but presumed due to inadequate cholesterol control on statin  alone.     3. Essential hypertension  Well controlled at home  -continue atenolol, losartan.     4. Obesity (BMI 30-39.9)  Weight loss  -diet/exercise.     5. DAVID (obstructive sleep apnea)  CPAP    6. Steatohepatitis  Concern for obesity related vs drug induced based on biopsy. Stable   -repeat Q3-6 months. If worsens may need to evaluate PCSK9 and if this is contributing, I think this is unlikely.     7. Type 2 diabetes mellitus without complication, without long-term current use of insulin (HCC)  Per PCP, poorly controlled currently.   -f/u with Dr. Elizondo as scheduled.     8. Polycythemia - potentially due to sleep apnea  -f/u with sleep medicine clinic for optimization  -repeat CBC, may need to look into alternatives if worsening.    9. Thrombocytopenia - recheck. May need heme referral if worsening.       Return in about 6 months (around 12/2/2022). with LEONARD Magana MD, [facility]  [facility], [address]  [address] [address]  Phone: [phone]  Fax: [phone]

## 2022-06-03 ENCOUNTER — TELEPHONE (OUTPATIENT)
Dept: CARDIOLOGY | Facility: MEDICAL CENTER | Age: 62
End: 2022-06-03
Payer: COMMERCIAL

## 2022-06-03 DIAGNOSIS — I10 ESSENTIAL HYPERTENSION: ICD-10-CM

## 2022-06-03 DIAGNOSIS — E78.5 DYSLIPIDEMIA: ICD-10-CM

## 2022-06-03 DIAGNOSIS — I25.119 CORONARY ARTERY DISEASE INVOLVING NATIVE CORONARY ARTERY OF NATIVE HEART WITH ANGINA PECTORIS (HCC): ICD-10-CM

## 2022-06-03 RX ORDER — EVOLOCUMAB 140 MG/ML
INJECTION, SOLUTION SUBCUTANEOUS
Qty: 6 EACH | Refills: 3 | Status: SHIPPED | OUTPATIENT
Start: 2022-06-03 | End: 2022-06-21 | Stop reason: SDUPTHER

## 2022-06-03 RX ORDER — ATENOLOL 25 MG/1
25 TABLET ORAL DAILY
Qty: 90 TABLET | Refills: 3 | Status: SHIPPED | OUTPATIENT
Start: 2022-06-03 | End: 2023-06-05

## 2022-06-03 RX ORDER — ROSUVASTATIN CALCIUM 10 MG/1
10 TABLET, COATED ORAL EVERY EVENING
Qty: 90 TABLET | Refills: 3 | Status: SHIPPED | OUTPATIENT
Start: 2022-06-03 | End: 2023-06-05

## 2022-06-03 RX ORDER — LOSARTAN POTASSIUM 50 MG/1
50 TABLET ORAL
Qty: 90 TABLET | Refills: 3 | Status: SHIPPED | OUTPATIENT
Start: 2022-06-03 | End: 2023-06-05

## 2022-06-03 NOTE — TELEPHONE ENCOUNTER
Spoke to Reyna HOFFMANN and patient wants medications sent to [facility].     RX's reordered to preferred pharmacy.

## 2022-06-03 NOTE — TELEPHONE ENCOUNTER
ELIOT      Caller: Severo Coleman    Medication Name and Dosage: Evolocumab, REPATHA, (REPATHA SURECLICK) 140 MG/ML Solution Auto-injector    Did patient contact pharmacy (If no, please call pharmacy first):Yes    Medication amount left: completely out    [facility]  PH: [phone]    Other questions (Topic): Pt called in and is needing this medication to go through [facility] instead please.    Callback Number (Will only call for issues):[phone]    Thank you,  Vanesa YEE

## 2022-06-03 NOTE — TELEPHONE ENCOUNTER
Severo Coleman (Sanchez: [id number])  Rx #: [id number]  Repatha SureClick 140MG/ML auto-injectors     Form  [facility] Prior Authorization Request Form   Plan Contact  [phone] phone  [phone] fax  Created  14 hours ago  Sent to Plan  3 minutes ago  Plan Response  3 minutes ago  Submit Clinical Questions  less than a minute ago  Determination  Wait for Determination  Please wait for [address] to return a determination.

## 2022-06-08 NOTE — TELEPHONE ENCOUNTER
ELIOT    Caller: Severo Coleman    Medication Name and Dosage: Evolocumab, REPATHA, (REPATHA SURECLICK) 140 MG/ML Solution Auto-injector     Did patient contact pharmacy (If no, please call pharmacy first): Yes    Medication amount left: has 2 left which is good for this month    Preferred Pharmacy:[facility]  PH: [phone]       Other questions (Topic): Pt stated that JM had this medication sent to [facility] and he is needing this medication to only go through [facility] please.    Callback Number (Will only call for issues): [phone]      Thank you,  Vanesa YEE

## 2022-06-20 ENCOUNTER — TELEPHONE (OUTPATIENT)
Dept: CARDIOLOGY | Facility: MEDICAL CENTER | Age: 62
End: 2022-06-20
Payer: COMMERCIAL

## 2022-06-20 DIAGNOSIS — E78.5 DYSLIPIDEMIA: ICD-10-CM

## 2022-06-20 NOTE — TELEPHONE ENCOUNTER
ELIOT      Caller: Severo    Medication Name and Dosage: Evolocumab, REPATHA, (REPATHA SURECLICK) 140 MG/ML Solution Auto-injector [[id number]]     Did patient contact pharmacy (If no, please call pharmacy first): yes    Medication amount left: 3 days.     Preferred Pharmacy: [facility] - Mail In  Fax - [phone]    Other questions (Topic): N/A    Callback Number (Will only call for issues): [phone]    Thank you,    Batsheva LAST

## 2022-06-21 RX ORDER — EVOLOCUMAB 140 MG/ML
INJECTION, SOLUTION SUBCUTANEOUS
Qty: 6 EACH | Refills: 3 | Status: SHIPPED | OUTPATIENT
Start: 2022-06-21 | End: 2023-06-13

## 2022-07-07 ENCOUNTER — HOSPITAL ENCOUNTER (OUTPATIENT)
Dept: LAB | Facility: MEDICAL CENTER | Age: 62
End: 2022-07-07
Attending: FAMILY MEDICINE
Payer: COMMERCIAL

## 2022-07-07 ENCOUNTER — HOSPITAL ENCOUNTER (OUTPATIENT)
Dept: LAB | Facility: MEDICAL CENTER | Age: 62
End: 2022-07-07
Attending: INTERNAL MEDICINE
Payer: COMMERCIAL

## 2022-07-07 DIAGNOSIS — D75.1 POLYCYTHEMIA: ICD-10-CM

## 2022-07-07 DIAGNOSIS — E78.5 DYSLIPIDEMIA: ICD-10-CM

## 2022-07-07 DIAGNOSIS — D69.6 THROMBOCYTOPENIA (HCC): ICD-10-CM

## 2022-07-07 LAB
25(OH)D3 SERPL-MCNC: 53 NG/ML (ref 30–100)
ALBUMIN SERPL BCP-MCNC: 4.3 G/DL (ref 3.2–4.9)
ALBUMIN/GLOB SERPL: 1.5 G/DL
ALP SERPL-CCNC: 112 U/L (ref 30–99)
ALT SERPL-CCNC: 67 U/L (ref 2–50)
ANION GAP SERPL CALC-SCNC: 14 MMOL/L (ref 7–16)
AST SERPL-CCNC: 59 U/L (ref 12–45)
BASOPHILS # BLD AUTO: 0.7 % (ref 0–1.8)
BASOPHILS # BLD: 0.06 K/UL (ref 0–0.12)
BILIRUB SERPL-MCNC: 1 MG/DL (ref 0.1–1.5)
BUN SERPL-MCNC: 30 MG/DL (ref 8–22)
CALCIUM SERPL-MCNC: 9.5 MG/DL (ref 8.4–10.2)
CHLORIDE SERPL-SCNC: 103 MMOL/L (ref 96–112)
CHOLEST SERPL-MCNC: 51 MG/DL (ref 100–199)
CHOLEST SERPL-MCNC: 53 MG/DL (ref 100–199)
CO2 SERPL-SCNC: 18 MMOL/L (ref 20–33)
CREAT SERPL-MCNC: 1.34 MG/DL (ref 0.5–1.4)
CREAT UR-MCNC: 70.83 MG/DL
EOSINOPHIL # BLD AUTO: 0.14 K/UL (ref 0–0.51)
EOSINOPHIL NFR BLD: 1.6 % (ref 0–6.9)
ERYTHROCYTE [DISTWIDTH] IN BLOOD BY AUTOMATED COUNT: 44.7 FL (ref 35.9–50)
ERYTHROCYTE [DISTWIDTH] IN BLOOD BY AUTOMATED COUNT: 44.9 FL (ref 35.9–50)
EST. AVERAGE GLUCOSE BLD GHB EST-MCNC: 203 MG/DL
FASTING STATUS PATIENT QL REPORTED: NORMAL
FASTING STATUS PATIENT QL REPORTED: NORMAL
GFR SERPLBLD CREATININE-BSD FMLA CKD-EPI: 60 ML/MIN/1.73 M 2
GLOBULIN SER CALC-MCNC: 2.8 G/DL (ref 1.9–3.5)
GLUCOSE SERPL-MCNC: 154 MG/DL (ref 65–99)
HBA1C MFR BLD: 8.7 % (ref 4–5.6)
HCT VFR BLD AUTO: 61.7 % (ref 42–52)
HCT VFR BLD AUTO: 62.2 % (ref 42–52)
HDLC SERPL-MCNC: 32 MG/DL
HDLC SERPL-MCNC: 32 MG/DL
HGB BLD-MCNC: 19.7 G/DL (ref 14–18)
HGB BLD-MCNC: 19.9 G/DL (ref 14–18)
IMM GRANULOCYTES # BLD AUTO: 0.15 K/UL (ref 0–0.11)
IMM GRANULOCYTES NFR BLD AUTO: 1.7 % (ref 0–0.9)
LDLC SERPL CALC-MCNC: ABNORMAL MG/DL
LDLC SERPL CALC-MCNC: ABNORMAL MG/DL
LYMPHOCYTES # BLD AUTO: 1.25 K/UL (ref 1–4.8)
LYMPHOCYTES NFR BLD: 14 % (ref 22–41)
MCH RBC QN AUTO: 28 PG (ref 27–33)
MCH RBC QN AUTO: 28.2 PG (ref 27–33)
MCHC RBC AUTO-ENTMCNC: 31.9 G/DL (ref 33.7–35.3)
MCHC RBC AUTO-ENTMCNC: 32 G/DL (ref 33.7–35.3)
MCV RBC AUTO: 87.5 FL (ref 81.4–97.8)
MCV RBC AUTO: 88.3 FL (ref 81.4–97.8)
MICROALBUMIN UR-MCNC: 97.8 MG/DL
MICROALBUMIN/CREAT UR: 1381 MG/G (ref 0–30)
MONOCYTES # BLD AUTO: 0.87 K/UL (ref 0–0.85)
MONOCYTES NFR BLD AUTO: 9.8 % (ref 0–13.4)
NEUTROPHILS # BLD AUTO: 6.44 K/UL (ref 1.82–7.42)
NEUTROPHILS NFR BLD: 72.2 % (ref 44–72)
NRBC # BLD AUTO: 0 K/UL
NRBC BLD-RTO: 0 /100 WBC
PLATELET # BLD AUTO: 145 K/UL (ref 164–446)
PLATELET # BLD AUTO: 147 K/UL (ref 164–446)
PMV BLD AUTO: 10.2 FL (ref 9–12.9)
PMV BLD AUTO: 9.8 FL (ref 9–12.9)
POTASSIUM SERPL-SCNC: 4.8 MMOL/L (ref 3.6–5.5)
PROT SERPL-MCNC: 7.1 G/DL (ref 6–8.2)
RBC # BLD AUTO: 6.99 M/UL (ref 4.7–6.1)
RBC # BLD AUTO: 7.11 M/UL (ref 4.7–6.1)
SODIUM SERPL-SCNC: 135 MMOL/L (ref 135–145)
TRIGL SERPL-MCNC: 133 MG/DL (ref 0–149)
TRIGL SERPL-MCNC: 135 MG/DL (ref 0–149)
TSH SERPL DL<=0.005 MIU/L-ACNC: 2.82 UIU/ML (ref 0.38–5.33)
WBC # BLD AUTO: 8.9 K/UL (ref 4.8–10.8)
WBC # BLD AUTO: 9.2 K/UL (ref 4.8–10.8)

## 2022-07-07 PROCEDURE — 83036 HEMOGLOBIN GLYCOSYLATED A1C: CPT

## 2022-07-07 PROCEDURE — 85027 COMPLETE CBC AUTOMATED: CPT

## 2022-07-07 PROCEDURE — 84443 ASSAY THYROID STIM HORMONE: CPT

## 2022-07-07 PROCEDURE — 80061 LIPID PANEL: CPT

## 2022-07-07 PROCEDURE — 82043 UR ALBUMIN QUANTITATIVE: CPT

## 2022-07-07 PROCEDURE — 80053 COMPREHEN METABOLIC PANEL: CPT

## 2022-07-07 PROCEDURE — 36415 COLL VENOUS BLD VENIPUNCTURE: CPT

## 2022-07-07 PROCEDURE — 80061 LIPID PANEL: CPT | Mod: 91

## 2022-07-07 PROCEDURE — 82570 ASSAY OF URINE CREATININE: CPT

## 2022-07-07 PROCEDURE — 82306 VITAMIN D 25 HYDROXY: CPT

## 2022-07-07 PROCEDURE — 85025 COMPLETE CBC W/AUTO DIFF WBC: CPT

## 2022-07-15 ENCOUNTER — TELEPHONE (OUTPATIENT)
Dept: CARDIOLOGY | Facility: MEDICAL CENTER | Age: 62
End: 2022-07-15
Payer: COMMERCIAL

## 2022-07-15 DIAGNOSIS — E78.5 DYSLIPIDEMIA: ICD-10-CM

## 2022-07-15 DIAGNOSIS — E11.9 TYPE 2 DIABETES MELLITUS WITHOUT COMPLICATION, WITHOUT LONG-TERM CURRENT USE OF INSULIN (HCC): ICD-10-CM

## 2022-07-16 NOTE — TELEPHONE ENCOUNTER
Labs reviewed and are stable except cholesterol is extremely low. Liver enzymes are up slightly which has happened in the past, and we will just monitor these.     Recommend stopping zetia and recheck  CMP and lipid panel in 3 months.     HgbA1c also worsening, recommend follow-up with PCP about diabetes control.

## 2022-07-19 NOTE — TELEPHONE ENCOUNTER
Discussed with pt, he is agreeable to these changes, requesting notification in 3mo when to get his labs done. Will stop zetia.     Pt recently saw PCP and is getting his A1C under control.

## 2022-08-19 ENCOUNTER — HOSPITAL ENCOUNTER (OUTPATIENT)
Dept: LAB | Facility: MEDICAL CENTER | Age: 62
End: 2022-08-19
Attending: FAMILY MEDICINE
Payer: COMMERCIAL

## 2022-08-19 LAB
ALBUMIN SERPL BCP-MCNC: 4.5 G/DL (ref 3.2–4.9)
ALBUMIN/GLOB SERPL: 1.5 G/DL
ALP SERPL-CCNC: 104 U/L (ref 30–99)
ALT SERPL-CCNC: 63 U/L (ref 2–50)
ANION GAP SERPL CALC-SCNC: 13 MMOL/L (ref 7–16)
AST SERPL-CCNC: 52 U/L (ref 12–45)
BILIRUB SERPL-MCNC: 0.8 MG/DL (ref 0.1–1.5)
BUN SERPL-MCNC: 28 MG/DL (ref 8–22)
CALCIUM SERPL-MCNC: 9.5 MG/DL (ref 8.4–10.2)
CHLORIDE SERPL-SCNC: 105 MMOL/L (ref 96–112)
CO2 SERPL-SCNC: 20 MMOL/L (ref 20–33)
CREAT SERPL-MCNC: 1.46 MG/DL (ref 0.5–1.4)
FASTING STATUS PATIENT QL REPORTED: NORMAL
GFR SERPLBLD CREATININE-BSD FMLA CKD-EPI: 54 ML/MIN/1.73 M 2
GLOBULIN SER CALC-MCNC: 3 G/DL (ref 1.9–3.5)
GLUCOSE SERPL-MCNC: 116 MG/DL (ref 65–99)
POTASSIUM SERPL-SCNC: 4.3 MMOL/L (ref 3.6–5.5)
PROT SERPL-MCNC: 7.5 G/DL (ref 6–8.2)
SODIUM SERPL-SCNC: 138 MMOL/L (ref 135–145)

## 2022-08-19 PROCEDURE — 80053 COMPREHEN METABOLIC PANEL: CPT

## 2022-08-19 PROCEDURE — 36415 COLL VENOUS BLD VENIPUNCTURE: CPT

## 2022-09-26 ENCOUNTER — TELEPHONE (OUTPATIENT)
Dept: CARDIOLOGY | Facility: MEDICAL CENTER | Age: 62
End: 2022-09-26
Payer: COMMERCIAL

## 2022-09-26 NOTE — TELEPHONE ENCOUNTER
ELIOT    Caller: Shaista    Name and Department: Health     Topic/Issue: INSURANCE     Shaista pt health . Per Shaista patient insurance is changing as of 10/01. So a patient asstiance form is being faxexd to ELIOT to fill out and fax back for patient to continue with his Reptha. Please advise.    Thank you,  Paddy BAILEY

## 2022-09-29 NOTE — TELEPHONE ENCOUNTER
ELIOT    Caller: Shaista [facility].    Topic/issue: Shaista called to follow up on the patient rx assistance form she faxed over on 09-. Stated I didn't see it and to refax over.    Callback Number: [phone]

## 2022-09-29 NOTE — TELEPHONE ENCOUNTER
Called Shaista  [phone]  No answer, left VM to call [phone]  Regarding fax still not received

## 2022-10-04 NOTE — TELEPHONE ENCOUNTER
Called pt [phone]  Pt states he will come to clinic to sign application and provide income documents. Advised pt that RN will be out of office and another RN will be aware of application. Pt verbalized understanding.       Route to triage RN for tomorrow, Luz Elena RAJAN

## 2022-10-04 NOTE — TELEPHONE ENCOUNTER
Pt arrived at clinic to sign documents and provide income.  Documents given to Duran RAJAN for follow up (signature) with [facility] on 10/10/22  Teams sent to Luz Elena RAJAN    Thank you Duran RAJAN

## 2022-10-04 NOTE — TELEPHONE ENCOUNTER
Caller: Pipo Elkins    Topic/issue: Severo is checking on the information for his insurance change.     Callback Number: [phone]    Thank you,   Batsheva OSULLIVAN

## 2022-10-06 ENCOUNTER — TELEPHONE (OUTPATIENT)
Dept: CARDIOLOGY | Facility: MEDICAL CENTER | Age: 62
End: 2022-10-06
Payer: COMMERCIAL

## 2022-10-06 NOTE — TELEPHONE ENCOUNTER
ELIOT    Caller: Shaista    Name and Department: Health     Topic/Issue: EDNA Noonan would like to know if patient can receive samples of this medication until the paperwork can be sent over with the providers signature. Please advise. Shaista would like a call back.     Thank you,  Paddy BAILEY    Callback Number or Extension: [phone]

## 2022-10-07 NOTE — TELEPHONE ENCOUNTER
S/W Shaista, asked that she reach out to [facility] drug enrollment form [phone] to ask about samples or resources that may be of assistance while waiting for paperwork to be signed on Monday 10/10. She will inform pt.

## 2022-10-10 NOTE — TELEPHONE ENCOUNTER
Received signed assistance application. Faxed back to [employer], received fax confirmation.   Scanned into [employer].     Called patient mobile number and LM stating patient assistance has been faxed in callback for further questions.

## 2022-10-11 ENCOUNTER — TELEPHONE (OUTPATIENT)
Dept: CARDIOLOGY | Facility: MEDICAL CENTER | Age: 62
End: 2022-10-11
Payer: COMMERCIAL

## 2022-10-11 NOTE — TELEPHONE ENCOUNTER
ELIOT    Caller: Severo    Topic/issue: Pt called to check on the status of his pt assistance for Zulema. Please call pt back.    Callback Number: [phone]

## 2022-10-11 NOTE — TELEPHONE ENCOUNTER
Called patient mobile number and LM assistance packet sent in and to contact them for information.

## 2022-10-12 ENCOUNTER — TELEPHONE (OUTPATIENT)
Dept: CARDIOLOGY | Facility: MEDICAL CENTER | Age: 62
End: 2022-10-12
Payer: COMMERCIAL

## 2022-10-12 NOTE — TELEPHONE ENCOUNTER
Called pt [phone]   Pt states he was denied by insurance. Advised pt that [facility] was to call pt regarding fee samples until paperwork can be reviewed. Explained to pt that paperwork was faxed on 10/10/22 and RN doubts paperwork has been reviewed in such a short amount of time. RN advised pt to call Shaista regarding free samples and denial letter. Pt reports he has phone number for Shaista and will call RN back regarding results of conversation. Pt verbalized understanding.

## 2022-10-12 NOTE — TELEPHONE ENCOUNTER
ELIOT        Caller: Severo Coleman      Topic/issue: Patient was returning our call regarding his repatha medication. He was unable to answer yesterday and was asking for a call back    Callback Number: [phone] (home)         Thank you    -Rayshawn RAMIREZ     Quality 111:Pneumonia Vaccination Status For Older Adults: Pneumococcal vaccine (PPSV23) was not administered on or after patientâs 60th birthday and before the end of the measurement period, reason not otherwise specified Quality 226: Preventive Care And Screening: Tobacco Use: Screening And Cessation Intervention: Patient screened for tobacco use and is an ex/non-smoker Quality 130: Documentation Of Current Medications In The Medical Record: Current Medications Documented Detail Level: Detailed Quality 431: Preventive Care And Screening: Unhealthy Alcohol Use - Screening: Patient not identified as an unhealthy alcohol user when screened for unhealthy alcohol use using a systematic screening method Dr. Galloway Quality 110: Preventive Care And Screening: Influenza Immunization: Influenza immunization was not ordered or administered, reason not given Quality 47: Advance Care Plan: Advance care planning not documented, reason not otherwise specified.

## 2022-10-13 NOTE — TELEPHONE ENCOUNTER
ELIOT    Caller: Severo    Topic/issue: Pt called and was concerned about not being able to take his Rapatha injection today. He wanted to know if Shaista called back to cardio. Advised I would let the nurse know you were calling back to check.    Callback Number: [phone]

## 2022-10-13 NOTE — TELEPHONE ENCOUNTER
Called Shaista [phone]  PA paper work was faxed to [facility] on 10/3 and 10/11. Advised Shaista that no PA fax was received. Provided fax number [phone]. New PA is required due to pt change in insurance.     ------------------------------------  Called pt [phone]  Updated pt on PA progress and fax still NOT received. Pt verbalized understanding.

## 2022-10-17 ENCOUNTER — HOSPITAL ENCOUNTER (OUTPATIENT)
Dept: LAB | Facility: MEDICAL CENTER | Age: 62
End: 2022-10-17
Attending: FAMILY MEDICINE
Payer: COMMERCIAL

## 2022-10-17 LAB
ALBUMIN SERPL BCP-MCNC: 4.2 G/DL (ref 3.2–4.9)
ALBUMIN/GLOB SERPL: 1.4 G/DL
ALP SERPL-CCNC: 98 U/L (ref 30–99)
ALT SERPL-CCNC: 67 U/L (ref 2–50)
ANION GAP SERPL CALC-SCNC: 12 MMOL/L (ref 7–16)
AST SERPL-CCNC: 56 U/L (ref 12–45)
BASOPHILS # BLD AUTO: 0.8 % (ref 0–1.8)
BASOPHILS # BLD: 0.06 K/UL (ref 0–0.12)
BILIRUB SERPL-MCNC: 0.7 MG/DL (ref 0.1–1.5)
BUN SERPL-MCNC: 24 MG/DL (ref 8–22)
CALCIUM SERPL-MCNC: 9.7 MG/DL (ref 8.4–10.2)
CHLORIDE SERPL-SCNC: 103 MMOL/L (ref 96–112)
CHOLEST SERPL-MCNC: 90 MG/DL (ref 100–199)
CO2 SERPL-SCNC: 20 MMOL/L (ref 20–33)
CREAT SERPL-MCNC: 1.31 MG/DL (ref 0.5–1.4)
CREAT UR-MCNC: 84.94 MG/DL
EOSINOPHIL # BLD AUTO: 0.18 K/UL (ref 0–0.51)
EOSINOPHIL NFR BLD: 2.4 % (ref 0–6.9)
ERYTHROCYTE [DISTWIDTH] IN BLOOD BY AUTOMATED COUNT: 50 FL (ref 35.9–50)
EST. AVERAGE GLUCOSE BLD GHB EST-MCNC: 197 MG/DL
FASTING STATUS PATIENT QL REPORTED: NORMAL
GFR SERPLBLD CREATININE-BSD FMLA CKD-EPI: 62 ML/MIN/1.73 M 2
GLOBULIN SER CALC-MCNC: 3.1 G/DL (ref 1.9–3.5)
GLUCOSE SERPL-MCNC: 153 MG/DL (ref 65–99)
HBA1C MFR BLD: 8.5 % (ref 4–5.6)
HCT VFR BLD AUTO: 60.1 % (ref 42–52)
HDLC SERPL-MCNC: 30 MG/DL
HGB BLD-MCNC: 18.9 G/DL (ref 14–18)
IMM GRANULOCYTES # BLD AUTO: 0.1 K/UL (ref 0–0.11)
IMM GRANULOCYTES NFR BLD AUTO: 1.3 % (ref 0–0.9)
LDLC SERPL CALC-MCNC: 13 MG/DL
LYMPHOCYTES # BLD AUTO: 1.03 K/UL (ref 1–4.8)
LYMPHOCYTES NFR BLD: 13.8 % (ref 22–41)
MCH RBC QN AUTO: 27.9 PG (ref 27–33)
MCHC RBC AUTO-ENTMCNC: 31.4 G/DL (ref 33.7–35.3)
MCV RBC AUTO: 88.6 FL (ref 81.4–97.8)
MICROALBUMIN UR-MCNC: 175.5 MG/DL
MICROALBUMIN/CREAT UR: 2066 MG/G (ref 0–30)
MONOCYTES # BLD AUTO: 0.69 K/UL (ref 0–0.85)
MONOCYTES NFR BLD AUTO: 9.2 % (ref 0–13.4)
NEUTROPHILS # BLD AUTO: 5.4 K/UL (ref 1.82–7.42)
NEUTROPHILS NFR BLD: 72.5 % (ref 44–72)
NRBC # BLD AUTO: 0 K/UL
NRBC BLD-RTO: 0 /100 WBC
PLATELET # BLD AUTO: 157 K/UL (ref 164–446)
PMV BLD AUTO: 10.6 FL (ref 9–12.9)
POTASSIUM SERPL-SCNC: 4.6 MMOL/L (ref 3.6–5.5)
PROT SERPL-MCNC: 7.3 G/DL (ref 6–8.2)
RBC # BLD AUTO: 6.78 M/UL (ref 4.7–6.1)
SODIUM SERPL-SCNC: 135 MMOL/L (ref 135–145)
TRIGL SERPL-MCNC: 235 MG/DL (ref 0–149)
TSH SERPL DL<=0.005 MIU/L-ACNC: 2.52 UIU/ML (ref 0.38–5.33)
WBC # BLD AUTO: 7.5 K/UL (ref 4.8–10.8)

## 2022-10-17 PROCEDURE — 80053 COMPREHEN METABOLIC PANEL: CPT

## 2022-10-17 PROCEDURE — 84443 ASSAY THYROID STIM HORMONE: CPT

## 2022-10-17 PROCEDURE — 82043 UR ALBUMIN QUANTITATIVE: CPT

## 2022-10-17 PROCEDURE — 80061 LIPID PANEL: CPT

## 2022-10-17 PROCEDURE — 83036 HEMOGLOBIN GLYCOSYLATED A1C: CPT

## 2022-10-17 PROCEDURE — 36415 COLL VENOUS BLD VENIPUNCTURE: CPT

## 2022-10-17 PROCEDURE — 82570 ASSAY OF URINE CREATININE: CPT

## 2022-10-17 PROCEDURE — 85025 COMPLETE CBC W/AUTO DIFF WBC: CPT

## 2022-10-21 NOTE — TELEPHONE ENCOUNTER
ELIOT      Caller: Shaista from [facility]    Topic/issue: Their office was calling about the prior authorization about the patient's repatha and were calling us back to follow up    Callback Number: [phone]      Thank you      -Rayshawn RAMIREZ

## 2022-10-25 NOTE — TELEPHONE ENCOUNTER
Called Shaista [phone]  No answer, left VM to call [phone]  Regarding pt assistance application for repatha

## 2022-10-25 NOTE — TELEPHONE ENCOUNTER
Called Shaista [phone]  Shaista states she faxed over PA for repatha 2x. In review of pt chart NO fax requesting PA has been received. Shaista will re-fax PA request NOW to [phone].      Awaiting PA fax from Shaista

## 2022-10-28 ENCOUNTER — TELEPHONE (OUTPATIENT)
Dept: SCHEDULING | Facility: IMAGING CENTER | Age: 62
End: 2022-10-28
Payer: COMMERCIAL

## 2022-10-28 NOTE — TELEPHONE ENCOUNTER
ELIOT        Caller: Shaista with [facility]    Topic/issue: They were calling about the patient's Prior Auth for Repatha and the forms for it would need to be sent back.    Callback Number: [phone]      Thank you    -Rayshawn RAMIREZ

## 2022-11-02 NOTE — TELEPHONE ENCOUNTER
Signed forms received from   Faxed to [phone]  Confirmation status received  Scan to [facility]     ------------------------  Called Shaista [phone]  No answer, left DETAILED VM stating 14 page fax sent to her.  Questions? Call [phone]

## 2022-11-03 NOTE — TELEPHONE ENCOUNTER
ELIOT    Caller: Severo Coleman     Topic/issue: Pt is reaching out to find an update on his Repatha medication and if it has been approved?    Callback Number: [phone] (home)       Thank you ,    Nelda GREEN

## 2022-11-04 NOTE — TELEPHONE ENCOUNTER
Called pt [phone]  Advised pt that letter was received from [employer] and repatha has been approved for 1 year(11/4/22-11/4/23). Pt verbalized understanding and appreciative of call back.       Letter scanned into chart

## 2022-11-14 ENCOUNTER — TELEPHONE (OUTPATIENT)
Dept: CARDIOLOGY | Facility: MEDICAL CENTER | Age: 62
End: 2022-11-14
Payer: COMMERCIAL

## 2022-11-14 NOTE — TELEPHONE ENCOUNTER
ELIOT    Caller: Megan     Name and Department:     [employer] [facility]  T: [phone]  F: [phone]    Topic/Issue: FACE SHEET    Per Megan he states that he is needing information for this patient. Including a face sheet with patient insurance and demographics. He also states that he is needing a household size for this patient. Please advise.    Thank you,  Paddy BAILEY    Callback Number or Extension: [phone]

## 2022-11-14 NOTE — TELEPHONE ENCOUNTER
Anjana: Please fax demographics paper to [employer] Patient Assistance fax number provided, and on cover sheet please tell them to contact the patient for number in household. Thanks!

## 2022-11-15 NOTE — TELEPHONE ENCOUNTER
Demographic faxed to [employer] Patient Assistance also noted to contact patient to get # of household. Fax confirmation received and sent to [employer] for scanning.

## 2022-11-23 ENCOUNTER — HOSPITAL ENCOUNTER (OUTPATIENT)
Dept: LAB | Facility: MEDICAL CENTER | Age: 62
End: 2022-11-23
Attending: INTERNAL MEDICINE
Payer: COMMERCIAL

## 2022-11-23 DIAGNOSIS — E78.5 DYSLIPIDEMIA: ICD-10-CM

## 2022-11-23 DIAGNOSIS — E11.9 TYPE 2 DIABETES MELLITUS WITHOUT COMPLICATION, WITHOUT LONG-TERM CURRENT USE OF INSULIN (HCC): ICD-10-CM

## 2022-11-23 LAB
ALBUMIN SERPL BCP-MCNC: 4.3 G/DL (ref 3.2–4.9)
ALBUMIN/GLOB SERPL: 1.6 G/DL
ALP SERPL-CCNC: 107 U/L (ref 30–99)
ALT SERPL-CCNC: 77 U/L (ref 2–50)
ANION GAP SERPL CALC-SCNC: 12 MMOL/L (ref 7–16)
AST SERPL-CCNC: 61 U/L (ref 12–45)
BILIRUB SERPL-MCNC: 0.7 MG/DL (ref 0.1–1.5)
BUN SERPL-MCNC: 24 MG/DL (ref 8–22)
CALCIUM SERPL-MCNC: 9.8 MG/DL (ref 8.4–10.2)
CHLORIDE SERPL-SCNC: 102 MMOL/L (ref 96–112)
CHOLEST SERPL-MCNC: 109 MG/DL (ref 100–199)
CO2 SERPL-SCNC: 22 MMOL/L (ref 20–33)
CREAT SERPL-MCNC: 1.43 MG/DL (ref 0.5–1.4)
FASTING STATUS PATIENT QL REPORTED: NORMAL
GFR SERPLBLD CREATININE-BSD FMLA CKD-EPI: 56 ML/MIN/1.73 M 2
GLOBULIN SER CALC-MCNC: 2.7 G/DL (ref 1.9–3.5)
GLUCOSE SERPL-MCNC: 147 MG/DL (ref 65–99)
HDLC SERPL-MCNC: 30 MG/DL
LDLC SERPL CALC-MCNC: 31 MG/DL
POTASSIUM SERPL-SCNC: 4.5 MMOL/L (ref 3.6–5.5)
PROT SERPL-MCNC: 7 G/DL (ref 6–8.2)
SODIUM SERPL-SCNC: 136 MMOL/L (ref 135–145)
TRIGL SERPL-MCNC: 241 MG/DL (ref 0–149)

## 2022-11-23 PROCEDURE — 36415 COLL VENOUS BLD VENIPUNCTURE: CPT

## 2022-11-23 PROCEDURE — 80061 LIPID PANEL: CPT

## 2022-11-23 PROCEDURE — 80053 COMPREHEN METABOLIC PANEL: CPT

## 2022-12-05 ENCOUNTER — OFFICE VISIT (OUTPATIENT)
Dept: CARDIOLOGY | Facility: MEDICAL CENTER | Age: 62
End: 2022-12-05
Payer: COMMERCIAL

## 2022-12-05 VITALS
OXYGEN SATURATION: 94 % | HEIGHT: 65 IN | DIASTOLIC BLOOD PRESSURE: 70 MMHG | RESPIRATION RATE: 16 BRPM | BODY MASS INDEX: 35.49 KG/M2 | SYSTOLIC BLOOD PRESSURE: 136 MMHG | HEART RATE: 61 BPM | WEIGHT: 213 LBS

## 2022-12-05 DIAGNOSIS — I25.10 CORONARY ARTERY DISEASE DUE TO CALCIFIED CORONARY LESION: ICD-10-CM

## 2022-12-05 DIAGNOSIS — E78.5 DYSLIPIDEMIA: ICD-10-CM

## 2022-12-05 DIAGNOSIS — I25.84 CORONARY ARTERY DISEASE DUE TO CALCIFIED CORONARY LESION: ICD-10-CM

## 2022-12-05 DIAGNOSIS — I10 ESSENTIAL HYPERTENSION: ICD-10-CM

## 2022-12-05 DIAGNOSIS — E66.01 CLASS 2 SEVERE OBESITY DUE TO EXCESS CALORIES WITH SERIOUS COMORBIDITY AND BODY MASS INDEX (BMI) OF 35.0 TO 35.9 IN ADULT (HCC): ICD-10-CM

## 2022-12-05 DIAGNOSIS — E11.9 TYPE 2 DIABETES MELLITUS WITHOUT COMPLICATION, WITHOUT LONG-TERM CURRENT USE OF INSULIN (HCC): ICD-10-CM

## 2022-12-05 DIAGNOSIS — Z95.1 S/P CABG (CORONARY ARTERY BYPASS GRAFT): Chronic | ICD-10-CM

## 2022-12-05 DIAGNOSIS — K75.81 STEATOHEPATITIS: ICD-10-CM

## 2022-12-05 PROBLEM — E66.812 CLASS 2 SEVERE OBESITY DUE TO EXCESS CALORIES WITH SERIOUS COMORBIDITY AND BODY MASS INDEX (BMI) OF 35.0 TO 35.9 IN ADULT (HCC): Status: ACTIVE | Noted: 2019-11-25

## 2022-12-05 PROCEDURE — 99214 OFFICE O/P EST MOD 30 MIN: CPT | Performed by: INTERNAL MEDICINE

## 2022-12-05 RX ORDER — METFORMIN HYDROCHLORIDE 500 MG/1
500 TABLET, EXTENDED RELEASE ORAL
COMMUNITY
Start: 2022-10-03 | End: 2023-01-09 | Stop reason: SDUPTHER

## 2022-12-05 ASSESSMENT — FIBROSIS 4 INDEX: FIB4 SCORE: 2.66

## 2022-12-05 NOTE — PROGRESS NOTES
Cardiology Follow-up Consultation Note    Date of note:  12/5/2022  Primary Care Provider: Jacey Elizondo M.D.  Referring Provider: Tez Tena M.D.     Patient Name: Severo Coleman   YOB: 1960  MRN:              [id number]    Chief Complaint: CAD    History of Present Illness: Severo Coleman is a 61 y.o. male whose current medical problems include diabetes, DAVID on CPAP, CAD s/p CABG, hypertension, dyslipidemia who is here for follow-up.    At our visit, 7/2/2020:  Liver enzymes elevated for years, relatively stable.     Started praluent 1 year ago.     At our visit, 4/22/2021:  Got J+J vaccine last week.     In terms of CAD, no angina. Does 20-30 minutes on the treadmill around 3 times a week.     Diabetes worsening.     At our visit, 6/2/2022:  Exercising on treadmill 2-3 times a week for 30 minutes.     In terms of hypertension, BP at home in the 120s.     Diabetes stable.       Interim Events:  BP in the 120s-130s.     In terms of DAVID, on CPAP.     Patient denies chest pain, palpitations, dyspnea on exertion, pre-syncope, syncope, lower extremity swelling, orthopnea, PND or recent weight gain.     Exercises 2-3 times a week, 30 minutes.     Diabetes worsening, worsening peripheral neuropathy.     ROS  + back pain, cough, allergies.   + Bilateral toe numbness.       Constitution: Negative for chills, fever and night sweats.   HENT: Negative for nosebleeds.    Eyes: Negative for vision loss in left eye and vision loss in right eye.   Respiratory: Negative for hemoptysis.    Gastrointestinal: Negative for hematemesis, hematochezia and melena.   Genitourinary: Negative for hematuria.       All other systems reviewed and discussed using a comprehensive questionnaire and are negative.             Past Medical History:   Diagnosis Date    CAD (coronary artery disease)     Congestive heart failure (HCC)     Heart valve disease     High cholesterol     HTN (hypertension)  6/30/2014    Hyperlipidemia 5/14/2012    Kidney stone     Liver disease     Myocardial infarct (HCC) 1998    Obesity 5/14/2012    Renal disorder     kidney stones    S/P CABG (coronary artery bypass graft) 1998    3 vessel         Past Surgical History:   Procedure Laterality Date    CARDIAC CATH, LEFT HEART  2007    open LIMA to LAD and SVG to Diag which was supplying RCA and LCx by collaterals.     MULTIPLE CORONARY ARTERY BYPASS  1998    3 vessel, per previous cardiology notes (unable to find records of op report), LIMA to LAD, SVG to inferior marginal (occluded), and SVG to Diagonal artery         Current Outpatient Medications   Medication Sig Dispense Refill    metFORMIN ER (GLUCOPHAGE XR) 500 MG TABLET SR 24 HR Take 500 mg by mouth every day.      Evolocumab, REPATHA, (REPATHA SURECLICK) 140 MG/ML Solution Auto-injector Inject 1 pen (140mg) under the skin every 14 days. 6 Each 3    atenolol (TENORMIN) 25 MG Tab Take 1 Tablet by mouth every day. 90 Tablet 3    losartan (COZAAR) 50 MG Tab Take 1 Tablet by mouth every day. 90 Tablet 3    rosuvastatin (CRESTOR) 10 MG Tab Take 1 Tablet by mouth every evening. 90 Tablet 3    FARXIGA 10 MG Tab TAKE 1 TABLET BY MOUTH EVERY DAY. REPLACES 5 MG TABLET      glimepiride (AMARYL) 2 MG Tab Take 2 mg by mouth every day.      glimepiride (AMARYL) 1 MG tablet Take 1 mg by mouth every day.      Cholecalciferol (VITAMIN D) 125 MCG (5000 UT) Cap Take 5,000 Units by mouth every day.      finasteride (PROSCAR) 5 MG Tab Take 5 mg by mouth every day.      folic acid (FOLVITE) 1 MG Tab Take 2 Tabs by mouth 2 Times a Day. 120 Tab 11    Pyridoxine HCl (VITAMIN B6 PO) Take  by mouth every day.      Coenzyme Q10 (CO Q-10 PO) Take  by mouth every day.      cyanocobalamin (VITAMIN B-12) 100 MCG TABS Take 100 mcg by mouth every day.        aspirin 81 MG tablet Take 81 mg by mouth every day.       No current facility-administered medications for this visit.         No Known  "Allergies      Family History   Problem Relation Age of Onset    Heart Attack Mother     Sleep Apnea Brother          Social History     Socioeconomic History    Marital status: Single     Spouse name: Not on file    Number of children: Not on file    Years of education: Not on file    Highest education level: Not on file   Occupational History    Not on file   Tobacco Use    Smoking status: Never    Smokeless tobacco: Never   Vaping Use    Vaping Use: Never used   Substance and Sexual Activity    Alcohol use: No    Drug use: No    Sexual activity: Not on file   Other Topics Concern    Not on file   Social History Narrative    Works at [employer].      Social Determinants of Health     Financial Resource Strain: Not on file   Food Insecurity: Not on file   Transportation Needs: Not on file   Physical Activity: Not on file   Stress: Not on file   Social Connections: Not on file   Intimate Partner Violence: Not on file   Housing Stability: Not on file         Physical Exam:  Ambulatory Vitals  /70 (BP Location: Left arm, Patient Position: Sitting, BP Cuff Size: Adult)   Pulse 61   Resp 16   Ht 1.651 m (5' 5\")   Wt 96.6 kg (213 lb)   SpO2 94%    Oxygen Therapy:  Pulse Oximetry: 94 %  BP Readings from Last 4 Encounters:   12/05/22 136/70   06/02/22 124/86   04/22/21 110/62   08/10/20 126/62       Weight/BMI: Body mass index is 35.45 kg/m².  Wt Readings from Last 4 Encounters:   12/05/22 96.6 kg (213 lb)   06/02/22 98 kg (216 lb)   04/22/21 98 kg (216 lb 0.8 oz)   08/10/20 96.6 kg (213 lb)     General: No apparent distress  Eyes: nl conjunctiva  ENT: OP covered by mask  Neck: JVP <8 cm H2O  Lungs: normal respiratory effort, CTAB  Heart: RRR, no murmurs, no rubs or gallops, 1+ edema bilateral lower extremities. No LV/RV heave on cardiac palpatation. 2+ bilateral radial pulses.   Abdomen: soft, non tender, non distended, no masses, normal bowel sounds.  No HSM.  Extremities/MSK: no clubbing, no " cyanosis  Neurological: No focal sensory deficits  Psychiatric: Appropriate affect, A/O x 3, intact judgement and insight  Skin: Warm extremities    Exam repeated in full and unchanged except for as noted above.        Lab Data Review:  Lab Results   Component Value Date/Time    CHOLSTRLTOT 109 11/23/2022 07:42 AM    LDL 31 11/23/2022 07:42 AM    HDL 30 (A) 11/23/2022 07:42 AM    TRIGLYCERIDE 241 (H) 11/23/2022 07:42 AM       Lab Results   Component Value Date/Time    SODIUM 136 11/23/2022 07:42 AM    POTASSIUM 4.5 11/23/2022 07:42 AM    CHLORIDE 102 11/23/2022 07:42 AM    CO2 22 11/23/2022 07:42 AM    GLUCOSE 147 (H) 11/23/2022 07:42 AM    BUN 24 (H) 11/23/2022 07:42 AM    CREATININE 1.43 (H) 11/23/2022 07:42 AM     Lab Results   Component Value Date/Time    ALKPHOSPHAT 107 (H) 11/23/2022 07:42 AM    ASTSGOT 61 (H) 11/23/2022 07:42 AM    ALTSGPT 77 (H) 11/23/2022 07:42 AM    TBILIRUBIN 0.7 11/23/2022 07:42 AM      Lab Results   Component Value Date/Time    WBC 7.5 10/17/2022 08:47 AM     No components found for: HBGA1C  No components found for: TROPONIN  No results found for: BNP      Cardiac Imaging and Procedures Review:    EKG dated 7/2/2020 : My personal interpretation is sinus bradycardia, previously large anteroseptal-lateral infarct, LAD, TOMAS, LVH. Posterior axis.       Echo dated 7/28/2020:  CONCLUSIONS  Normal right and left ventricular size and function.   Mild concentric left ventricular hypertrophy.  Wall motion not well visualized however appeared grossly normal.  No significant valvular stenosis or regurgitation.   Normal estiamted right atrial pressure.  Unable to estimate pulmonary artery pressure due to an inadequate   tricuspid regurgitant jet.     Compared to the previous echocardiogram performed on 08/26/2013: There   has been no significant change.     Normal left ventricular chamber size. Mild concentric left ventricular   hypertrophy. Left ventricular ejection fraction is visually estimated    to be 60%. Wall motion not well visualized however appeared grossly   normal. Normal diastolic function.      Echo dated :   CONCLUSIONS   Normal left ventricular systolic function.   Mild to moderate concentric left ventricular hypertrophy.   Left ventricular ejection fraction is 55% to 60%.   No significant valve abnormalities.     Radiology test Review:  CXR:   FINDINGS:  The patient is status post CABG. The cardiac silhouette is enlarged.  No pulmonary infiltrates or consolidations are noted.  No pleural effusions are appreciated.    Liver ultrasound 2016:  IMPRESSION:     1.  Cholelithiasis.  2.  Increased liver echogenicity consistent with hepatic steatosis.  3.  8 mm cyst in left lobe of liver.  4.  3.2 cm cyst in the right kidney.    Liver Biopsy 2016:  A. Liver Biopsy:          Benign liver tissue showing moderate fatty change (approximately           35% of hepatocytes show steatosis).          The lobules also show mild lobular chronic inflammation, slight           acute inflammation and rare degenerated hepatocytes.          The portal tracts show mild chronic inflammation, minimal acute           inflammation and rare eosinophils; the bile ducts are           unremarkable except for one duct which shows focal acute           inflammation of its wall.          The trichrome and reticulin stains do not show significant           fibrosis or architectural distortion.          The iron stain does not demonstrate increased iron deposition     Comment: The findings fit for steatohepatitis without significant   fibrosis.  Drugs and toxins are possible causes.  Clinical correlation   is suggested.  Clinical information from Dr. Hou's office has also   been reviewed in conjunction with the biopsy.       Medical Decision Makin. Coronary artery disease due to a calcified coronary lesion: Status post CABG x3 in .  Occluded SVG to OM with a patent LIMA to the LAD in   Per notes,  appears LIMA and SVG to diag are open, however other notes say only LIMA is open. Currently asymptomatic, however EKG showed quite extensive anterolateral infarct. Wall motion and LVEF were grossly normal 7/2020.   -continue aspirin 81mg PO Daily  -continue repatha  -continue atenolol, will consider switching to metoprolol in the future.     2. Dyslipidemia  Continue repatha as per above. Added crestor, stable LFTs, and now cholesterol well controlled. Started on PCSK9 inhibitor prior to me being his cardiologist, but presumed due to inadequate cholesterol control on statin alone.     3. Essential hypertension  Well controlled at home  -continue atenolol, losartan.     4. Obesity (BMI 30-39.9)  Weight loss  -diet/exercise.     5. DAVID (obstructive sleep apnea)  CPAP    6. Steatohepatitis  Concern for obesity related vs drug induced based on biopsy in 2016. Stable   -repeat Q3-6 months. If worsens may need to evaluate PCSK9 and if this is contributing, I think this is unlikely.   -referral to GI, saw them previously, may need further imaging or biopsy or trial of stopping lipid medications.     7. Type 2 diabetes mellitus without complication, without long-term current use of insulin (HCC)  Per PCP, poorly controlled currently.   -f/u with Dr. Elizondo as scheduled.   -referral to pharmacotherapy to help with aggressive diabetes control.     8. Polycythemia - likely due to sleep apnea  -continue CPAP, labs improving.   -repeat CBC, may need to look into alternatives if worsening.    9. Thrombocytopenia - recheck. May need heme referral if worsening. Currently improving.       Return in about 6 months (around 6/5/2023). with LEONARD Magana MD, [facility]  [facility], [facility]  [address], [address] [address]  Phone: [phone]  Fax: [phone]

## 2022-12-12 ENCOUNTER — TELEPHONE (OUTPATIENT)
Dept: CARDIOLOGY | Facility: MEDICAL CENTER | Age: 62
End: 2022-12-12
Payer: COMMERCIAL

## 2022-12-12 NOTE — TELEPHONE ENCOUNTER
Received fax from [employer] to refax patient assistance.  Patient assistance reprinted and faxed as requested to [phone], completed status received.    Fax sent to scanning via [employer] for reference, completed status.

## 2022-12-30 NOTE — PATIENT INSTRUCTIONS
Please let me know if you don't hear about your pharmacy referral and your GI referral within the next 2 weeks.   
Statement Selected

## 2023-01-09 ENCOUNTER — NON-PROVIDER VISIT (OUTPATIENT)
Dept: VASCULAR LAB | Facility: MEDICAL CENTER | Age: 63
End: 2023-01-09
Attending: INTERNAL MEDICINE
Payer: COMMERCIAL

## 2023-01-09 DIAGNOSIS — E11.9 TYPE 2 DIABETES MELLITUS WITHOUT COMPLICATION, WITHOUT LONG-TERM CURRENT USE OF INSULIN (HCC): ICD-10-CM

## 2023-01-09 PROCEDURE — 99213 OFFICE O/P EST LOW 20 MIN: CPT

## 2023-01-09 RX ORDER — METFORMIN HYDROCHLORIDE 500 MG/1
1000 TABLET, EXTENDED RELEASE ORAL 2 TIMES DAILY
Qty: 360 TABLET | Refills: 0 | Status: SHIPPED | OUTPATIENT
Start: 2023-01-09 | End: 2023-04-05 | Stop reason: SDUPTHER

## 2023-01-09 NOTE — PROGRESS NOTES
Patient Consult Note      Primary care physician: Jacey Elizondo M.D.    Reason for consult: Management of Uncontrolled Type 2 Diabetes    HPI:  Severo Coleman is a 62 y.o. old patient who comes in today for evaluation of above stated problem.    Signed consent form during visit.    Most Recent HbA1c and POCT glucose:   Lab Results   Component Value Date/Time    HBA1C 8.5 (H) 10/17/2022 08:47 AM            Most Recent Scr:  Lab Results   Component Value Date/Time    CREATININE 1.43 (H) 11/23/2022 07:42 AM        Current Diabetes Medication Regimen  Metformin: XR 1000 mg every morning, 500 mg qpm   SGLT-2 Inhibitor: farxiga 10 mg daily   Sulfonylurea: glimepiride 2 mg at night    Previous Diabetes Medications and Reason for Discontinuation  None    Pt has home glucometer and proper testing technique - No, educated pt regarding purpose of obtaining levels, pt would like to hold off on glucometer at this time.    Pt reports blood sugars:   Before Breakfast:     Hypoglycemia awareness - reviewed symptoms of tremors, sweating, irritability with patient  Nocturnal hypoglycemia- None  Hypoglycemia:  None    Pt's treatment of Hypoglycemia - reviewed 15:15 Rule with pt    Current Exercise - treadmill for 30 minutes, 3 times a week  Exercise Goal - Lose weight.     Dietary -   Breakfast - bagel, cream cheese, and salmon  Lunch - normally skips  Dinner - chicken, rice, bagels, muffins  Snacks - no  Drinks - water, orange juice mixed 1:1 with water.    Preventative Management  BP regimen (ACE/ARB) - losartan 50 mg daily  ASA - 81 mg daily  Statin - rosuvastatin 10 mg qpm  Last Retinal Scan - has appointment in 1 week  Last Foot Exam - Last done by  in December 2022  Last A1c -   Lab Results   Component Value Date/Time    HBA1C 8.5 (H) 10/17/2022 08:47 AM      Last Microalbuminuria -    Latest Reference Range & Units 10/17/22 08:47   Micro Alb Creat Ratio 0 - 30 mg/g 2066 (H)   Microalbumin, Urine Random mg/dL  175.5   (H): Data is abnormally high      Past Medical History:  Patient Active Problem List    Diagnosis Date Noted    Thrombocytopenia ([facility]) 06/02/2022    Polycythemia 06/02/2022    Steatohepatitis 07/02/2020    Type 2 diabetes mellitus without complication, without long-term current use of insulin ([facility]) 07/02/2020    DAVID (obstructive sleep apnea) 03/16/2020    Class 2 severe obesity due to excess calories with serious comorbidity and body mass index (BMI) of 35.0 to 35.9 in adult ([facility]) 11/25/2019    Essential hypertension 06/30/2014    S/P CABG (coronary artery bypass graft)     Coronary artery disease due to a calcified coronary lesion: Status post CABG x3 in 1998.  Occluded SVG to OM with a patent LIMA to the LAD in 2007 05/14/2012    Dyslipidemia 05/14/2012       Past Surgical History:  Past Surgical History:   Procedure Laterality Date    CARDIAC CATH, LEFT HEART  2007    open LIMA to LAD and SVG to Diag which was supplying RCA and LCx by collaterals.     MULTIPLE CORONARY ARTERY BYPASS  1998    3 vessel, per previous cardiology notes (unable to find records of op report), LIMA to LAD, SVG to inferior marginal (occluded), and SVG to Diagonal artery       Allergies:  Patient has no known allergies.    Social History:  Social History     Socioeconomic History    Marital status: Single     Spouse name: Not on file    Number of children: Not on file    Years of education: Not on file    Highest education level: Not on file   Occupational History    Not on file   Tobacco Use    Smoking status: Never    Smokeless tobacco: Never   Vaping Use    Vaping Use: Never used   Substance and Sexual Activity    Alcohol use: No    Drug use: No    Sexual activity: Not on file   Other Topics Concern    Not on file   Social History Narrative    Works at [employer].      Social Determinants of Health     Financial Resource Strain: Not on file   Food Insecurity: Not on file   Transportation Needs: Not on file   Physical Activity:  Not on file   Stress: Not on file   Social Connections: Not on file   Intimate Partner Violence: Not on file   Housing Stability: Not on file       Family History:  Family History   Problem Relation Age of Onset    Heart Attack Mother     Sleep Apnea Brother        Medications:    Current Outpatient Medications:     metFORMIN ER (GLUCOPHAGE XR) 500 MG TABLET SR 24 HR, Take 500 mg by mouth every day., Disp: , Rfl:     Evolocumab, REPATHA, (REPATHA SURECLICK) 140 MG/ML Solution Auto-injector, Inject 1 pen (140mg) under the skin every 14 days., Disp: 6 Each, Rfl: 3    atenolol (TENORMIN) 25 MG Tab, Take 1 Tablet by mouth every day., Disp: 90 Tablet, Rfl: 3    losartan (COZAAR) 50 MG Tab, Take 1 Tablet by mouth every day., Disp: 90 Tablet, Rfl: 3    rosuvastatin (CRESTOR) 10 MG Tab, Take 1 Tablet by mouth every evening., Disp: 90 Tablet, Rfl: 3    FARXIGA 10 MG Tab, TAKE 1 TABLET BY MOUTH EVERY DAY. REPLACES 5 MG TABLET, Disp: , Rfl:     glimepiride (AMARYL) 2 MG Tab, Take 2 mg by mouth every day., Disp: , Rfl:     glimepiride (AMARYL) 1 MG tablet, Take 1 mg by mouth every day., Disp: , Rfl:     Cholecalciferol (VITAMIN D) 125 MCG (5000 UT) Cap, Take 5,000 Units by mouth every day., Disp: , Rfl:     finasteride (PROSCAR) 5 MG Tab, Take 5 mg by mouth every day., Disp: , Rfl:     folic acid (FOLVITE) 1 MG Tab, Take 2 Tabs by mouth 2 Times a Day., Disp: 120 Tab, Rfl: 11    Pyridoxine HCl (VITAMIN B6 PO), Take  by mouth every day., Disp: , Rfl:     Coenzyme Q10 (CO Q-10 PO), Take  by mouth every day., Disp: , Rfl:     cyanocobalamin (VITAMIN B-12) 100 MCG TABS, Take 100 mcg by mouth every day.  , Disp: , Rfl:     aspirin 81 MG tablet, Take 81 mg by mouth every day., Disp: , Rfl:     Labs: Reviewed    Physical Examination:  Vital signs: There were no vitals taken for this visit. There is no height or weight on file to calculate BMI.    Assessment and Plan:    1. DM2  Basic physiology of DMII was explained to patient as well  as microvascular/macrovascular complications. The importance of increasing physical activity to improve diabetes control was discussed with the patient. Patient was also educated on changing diet and making better choices to help control blood sugar.   Discussed Goals: FBG 80 - 130, 2hPP < 180, a1c < 7.0%   Next A1c due on 1/17/2022, ordering a1c for next visit  Glimepiride increased from 1 mg to 2 mg by Dr. Elizondo.  Counseled pt regarding   GI side effects from metformin, has not experienced diarrhea  Glimepiride: take with food to reduce hypoglycemia events  Farxiga: stay hydrated, educated regarding rare possibility of ketoacidosis  Will plan to optimize current medications    - Medication changes:  Increase Metformin XR to 1000 mg BID  Adjust timing of glimepiride 2 mg, take in the morning with meals  Continue to take Farxiga 10 mg daily    - Lifestyle changes:  Diet: Consider swapping out bagels for healthier alternatives such as wheat or rye bread  Exercise: Meeting exercise goal of  minuts per week, increase to 150 minutes per week if able.    Follow Up:  4 weeks    Kendrick Babin, PharmD    CC:   Jacey Elizondo M.D.  Ismael Magana M.D.  Yuval Paris M.D.  Bakari Alcocer, RadhaD

## 2023-01-12 ENCOUNTER — HOSPITAL ENCOUNTER (OUTPATIENT)
Dept: LAB | Facility: MEDICAL CENTER | Age: 63
End: 2023-01-12
Attending: FAMILY MEDICINE
Payer: COMMERCIAL

## 2023-01-12 LAB
ALBUMIN SERPL BCP-MCNC: 4.1 G/DL (ref 3.2–4.9)
ALBUMIN/GLOB SERPL: 1.6 G/DL
ALP SERPL-CCNC: 95 U/L (ref 30–99)
ALT SERPL-CCNC: 74 U/L (ref 2–50)
ANION GAP SERPL CALC-SCNC: 12 MMOL/L (ref 7–16)
AST SERPL-CCNC: 69 U/L (ref 12–45)
BASOPHILS # BLD AUTO: 0.7 % (ref 0–1.8)
BASOPHILS # BLD: 0.05 K/UL (ref 0–0.12)
BILIRUB SERPL-MCNC: 0.8 MG/DL (ref 0.1–1.5)
BUN SERPL-MCNC: 20 MG/DL (ref 8–22)
CALCIUM ALBUM COR SERPL-MCNC: 9.6 MG/DL (ref 8.5–10.5)
CALCIUM SERPL-MCNC: 9.7 MG/DL (ref 8.4–10.2)
CHLORIDE SERPL-SCNC: 105 MMOL/L (ref 96–112)
CHOLEST SERPL-MCNC: 133 MG/DL (ref 100–199)
CO2 SERPL-SCNC: 21 MMOL/L (ref 20–33)
CREAT SERPL-MCNC: 1.33 MG/DL (ref 0.5–1.4)
CREAT UR-MCNC: 104.86 MG/DL
EOSINOPHIL # BLD AUTO: 0.23 K/UL (ref 0–0.51)
EOSINOPHIL NFR BLD: 3.1 % (ref 0–6.9)
ERYTHROCYTE [DISTWIDTH] IN BLOOD BY AUTOMATED COUNT: 45.7 FL (ref 35.9–50)
EST. AVERAGE GLUCOSE BLD GHB EST-MCNC: 189 MG/DL
FASTING STATUS PATIENT QL REPORTED: NORMAL
GFR SERPLBLD CREATININE-BSD FMLA CKD-EPI: 61 ML/MIN/1.73 M 2
GLOBULIN SER CALC-MCNC: 2.6 G/DL (ref 1.9–3.5)
GLUCOSE SERPL-MCNC: 135 MG/DL (ref 65–99)
HBA1C MFR BLD: 8.2 % (ref 4–5.6)
HCT VFR BLD AUTO: 59.1 % (ref 42–52)
HDLC SERPL-MCNC: 31 MG/DL
HGB BLD-MCNC: 18.6 G/DL (ref 14–18)
IMM GRANULOCYTES # BLD AUTO: 0.13 K/UL (ref 0–0.11)
IMM GRANULOCYTES NFR BLD AUTO: 1.8 % (ref 0–0.9)
LDLC SERPL CALC-MCNC: 60 MG/DL
LYMPHOCYTES # BLD AUTO: 1.14 K/UL (ref 1–4.8)
LYMPHOCYTES NFR BLD: 15.5 % (ref 22–41)
MCH RBC QN AUTO: 27.6 PG (ref 27–33)
MCHC RBC AUTO-ENTMCNC: 31.5 G/DL (ref 33.7–35.3)
MCV RBC AUTO: 87.6 FL (ref 81.4–97.8)
MICROALBUMIN UR-MCNC: >40 MG/DL
MICROALBUMIN/CREAT UR: 381 MG/G (ref 0–30)
MONOCYTES # BLD AUTO: 0.77 K/UL (ref 0–0.85)
MONOCYTES NFR BLD AUTO: 10.5 % (ref 0–13.4)
NEUTROPHILS # BLD AUTO: 5.02 K/UL (ref 1.82–7.42)
NEUTROPHILS NFR BLD: 68.4 % (ref 44–72)
NRBC # BLD AUTO: 0 K/UL
NRBC BLD-RTO: 0 /100 WBC
PLATELET # BLD AUTO: 149 K/UL (ref 164–446)
PMV BLD AUTO: 10.2 FL (ref 9–12.9)
POTASSIUM SERPL-SCNC: 4.4 MMOL/L (ref 3.6–5.5)
PROT SERPL-MCNC: 6.7 G/DL (ref 6–8.2)
RBC # BLD AUTO: 6.75 M/UL (ref 4.7–6.1)
SODIUM SERPL-SCNC: 138 MMOL/L (ref 135–145)
TRIGL SERPL-MCNC: 211 MG/DL (ref 0–149)
TSH SERPL DL<=0.005 MIU/L-ACNC: 3.5 UIU/ML (ref 0.38–5.33)
WBC # BLD AUTO: 7.3 K/UL (ref 4.8–10.8)

## 2023-01-12 PROCEDURE — 83036 HEMOGLOBIN GLYCOSYLATED A1C: CPT

## 2023-01-12 PROCEDURE — 84443 ASSAY THYROID STIM HORMONE: CPT

## 2023-01-12 PROCEDURE — 80053 COMPREHEN METABOLIC PANEL: CPT

## 2023-01-12 PROCEDURE — 82570 ASSAY OF URINE CREATININE: CPT

## 2023-01-12 PROCEDURE — 85025 COMPLETE CBC W/AUTO DIFF WBC: CPT

## 2023-01-12 PROCEDURE — 80061 LIPID PANEL: CPT

## 2023-01-12 PROCEDURE — 82043 UR ALBUMIN QUANTITATIVE: CPT

## 2023-01-12 PROCEDURE — 36415 COLL VENOUS BLD VENIPUNCTURE: CPT

## 2023-02-06 ENCOUNTER — NON-PROVIDER VISIT (OUTPATIENT)
Dept: VASCULAR LAB | Facility: MEDICAL CENTER | Age: 63
End: 2023-02-06
Attending: INTERNAL MEDICINE
Payer: COMMERCIAL

## 2023-02-06 DIAGNOSIS — E11.9 TYPE 2 DIABETES MELLITUS WITHOUT COMPLICATION, WITHOUT LONG-TERM CURRENT USE OF INSULIN (HCC): ICD-10-CM

## 2023-02-06 PROCEDURE — 99212 OFFICE O/P EST SF 10 MIN: CPT

## 2023-02-06 RX ORDER — ORAL SEMAGLUTIDE 3 MG/1
3 TABLET ORAL DAILY
Qty: 30 TABLET | Refills: 5 | Status: SHIPPED | OUTPATIENT
Start: 2023-02-06 | End: 2023-06-13

## 2023-02-06 NOTE — PROGRESS NOTES
Patient Consult Note      Primary care physician: Jacey Elizondo M.D.    Reason for consult: Management of Uncontrolled Type 2 Diabetes    HPI:  Severo Coleman is a 62 y.o. old patient who comes in today for evaluation of above stated problem.    Signed consent form during visit.    Most Recent HbA1c and POCT glucose:   Lab Results   Component Value Date/Time    HBA1C 8.2 (H) 01/12/2023 07:46 AM            Most Recent Scr:  Lab Results   Component Value Date/Time    CREATININE 1.33 01/12/2023 07:46 AM        Current Diabetes Medication Regimen  Metformin: XR 1000 mg bid  SGLT-2 Inhibitor: farxiga 10 mg daily   Sulfonylurea: glimepiride 2 mg at night    Previous Diabetes Medications and Reason for Discontinuation  None    Pt has home glucometer and proper testing technique - No, educated pt regarding purpose of obtaining levels, pt would like to hold off on glucometer at this time.    Pt reports blood sugars:   Before Breakfast:     Hypoglycemia awareness - reviewed symptoms of tremors, sweating, irritability with patient  Nocturnal hypoglycemia- None  Hypoglycemia:  None    Preventative Management  BP regimen (ACE/ARB) - losartan 50 mg daily  ASA - 81 mg daily  Statin - rosuvastatin 10 mg qpm  Last Retinal Scan - 01/23  Last Foot Exam - 12/22  Last A1c -   Lab Results   Component Value Date/Time    HBA1C 8.2 (H) 01/12/2023 07:46 AM      Last Microalbuminuria -    Latest Reference Range & Units 10/17/22 08:47   Micro Alb Creat Ratio 0 - 30 mg/g 2066 (H)   Microalbumin, Urine Random mg/dL 175.5   (H): Data is abnormally high      Past Medical History:  Patient Active Problem List    Diagnosis Date Noted    Thrombocytopenia (HCC) 06/02/2022    Polycythemia 06/02/2022    Steatohepatitis 07/02/2020    Type 2 diabetes mellitus without complication, without long-term current use of insulin (HCC) 07/02/2020    DAVID (obstructive sleep apnea) 03/16/2020    Class 2 severe obesity due to excess calories with serious  comorbidity and body mass index (BMI) of 35.0 to 35.9 in adult ([facility]) 11/25/2019    Essential hypertension 06/30/2014    S/P CABG (coronary artery bypass graft)     Coronary artery disease due to a calcified coronary lesion: Status post CABG x3 in 1998.  Occluded SVG to OM with a patent LIMA to the LAD in 2007 05/14/2012    Dyslipidemia 05/14/2012       Past Surgical History:  Past Surgical History:   Procedure Laterality Date    CARDIAC CATH, LEFT HEART  2007    open LIMA to LAD and SVG to Diag which was supplying RCA and LCx by collaterals.     MULTIPLE CORONARY ARTERY BYPASS  1998    3 vessel, per previous cardiology notes (unable to find records of op report), LIMA to LAD, SVG to inferior marginal (occluded), and SVG to Diagonal artery       Allergies:  Patient has no known allergies.    Social History:  Social History     Socioeconomic History    Marital status: Single     Spouse name: Not on file    Number of children: Not on file    Years of education: Not on file    Highest education level: Not on file   Occupational History    Not on file   Tobacco Use    Smoking status: Never    Smokeless tobacco: Never   Vaping Use    Vaping Use: Never used   Substance and Sexual Activity    Alcohol use: No    Drug use: No    Sexual activity: Not on file   Other Topics Concern    Not on file   Social History Narrative    Works at [employer].      Social Determinants of Health     Financial Resource Strain: Not on file   Food Insecurity: Not on file   Transportation Needs: Not on file   Physical Activity: Not on file   Stress: Not on file   Social Connections: Not on file   Intimate Partner Violence: Not on file   Housing Stability: Not on file       Family History:  Family History   Problem Relation Age of Onset    Heart Attack Mother     Sleep Apnea Brother        Medications:    Current Outpatient Medications:     Semaglutide (RYBELSUS) 3 MG Tab, Take 3 mg by mouth every day., Disp: 30 Tablet, Rfl: 5    metFORMIN ER  (GLUCOPHAGE XR) 500 MG TABLET SR 24 HR, Take 2 Tablets by mouth 2 times a day., Disp: 360 Tablet, Rfl: 0    Evolocumab, REPATHA, (REPATHA SURECLICK) 140 MG/ML Solution Auto-injector, Inject 1 pen (140mg) under the skin every 14 days., Disp: 6 Each, Rfl: 3    atenolol (TENORMIN) 25 MG Tab, Take 1 Tablet by mouth every day., Disp: 90 Tablet, Rfl: 3    losartan (COZAAR) 50 MG Tab, Take 1 Tablet by mouth every day., Disp: 90 Tablet, Rfl: 3    rosuvastatin (CRESTOR) 10 MG Tab, Take 1 Tablet by mouth every evening., Disp: 90 Tablet, Rfl: 3    FARXIGA 10 MG Tab, TAKE 1 TABLET BY MOUTH EVERY DAY. REPLACES 5 MG TABLET, Disp: , Rfl:     glimepiride (AMARYL) 2 MG Tab, Take 2 mg by mouth every day., Disp: , Rfl:     Cholecalciferol (VITAMIN D) 125 MCG (5000 UT) Cap, Take 5,000 Units by mouth every day., Disp: , Rfl:     finasteride (PROSCAR) 5 MG Tab, Take 5 mg by mouth every day., Disp: , Rfl:     folic acid (FOLVITE) 1 MG Tab, Take 2 Tabs by mouth 2 Times a Day., Disp: 120 Tab, Rfl: 11    Pyridoxine HCl (VITAMIN B6 PO), Take  by mouth every day., Disp: , Rfl:     Coenzyme Q10 (CO Q-10 PO), Take  by mouth every day., Disp: , Rfl:     cyanocobalamin (VITAMIN B-12) 100 MCG TABS, Take 100 mcg by mouth every day.  , Disp: , Rfl:     aspirin 81 MG tablet, Take 81 mg by mouth every day., Disp: , Rfl:     Labs: Reviewed    Physical Examination:  Vital signs: There were no vitals taken for this visit. There is no height or weight on file to calculate BMI.    Assessment and Plan:    1. DM2  Pt has been tolerating max dose of metformin  Pt was started on samples of Rybelsus 3 mg daily by PCP - he states he was previously on this and was titrated up to 7mg daily but discontinued it d/t cost    - Medication changes:  Continue present management - sent Rybelsus RX to [facility] to check cost    - Lifestyle changes:  Unable to discuss    Of note - pt's copay for this visit and the last visit was $200 each, which is unaffordable. Pt  does not want to f/u with our clinic if each appt will have a $200 copay. Pt has HTH - will forward to Bakari Alcocer to investigate why copay is so high.    Follow Up:  Pending copay     Corinne Camacho, PharmD    CC Bakari Alcocer

## 2023-02-06 NOTE — Clinical Note
BABS Villegas! I saw this pt today for DM and his copay for this visit and the last visit were $200 each! He has HTH so I'm not sure why his copay is so high. He no longer wants to see us if his next appt continues to be $200 so we didn't schedule a follow up appt. Is this copay inaccurate since he has HTH?

## 2023-04-05 DIAGNOSIS — E11.9 TYPE 2 DIABETES MELLITUS WITHOUT COMPLICATION, WITHOUT LONG-TERM CURRENT USE OF INSULIN (HCC): ICD-10-CM

## 2023-04-05 RX ORDER — METFORMIN HYDROCHLORIDE 500 MG/1
1000 TABLET, EXTENDED RELEASE ORAL 2 TIMES DAILY
Qty: 360 TABLET | Refills: 0 | Status: SHIPPED | OUTPATIENT
Start: 2023-04-05

## 2023-04-06 ENCOUNTER — HOSPITAL ENCOUNTER (OUTPATIENT)
Dept: LAB | Facility: MEDICAL CENTER | Age: 63
End: 2023-04-06
Attending: FAMILY MEDICINE
Payer: COMMERCIAL

## 2023-04-06 LAB
ALBUMIN SERPL BCP-MCNC: 4 G/DL (ref 3.2–4.9)
ALBUMIN/GLOB SERPL: 1.3 G/DL
ALP SERPL-CCNC: 94 U/L (ref 30–99)
ALT SERPL-CCNC: 49 U/L (ref 2–50)
ANION GAP SERPL CALC-SCNC: 13 MMOL/L (ref 7–16)
AST SERPL-CCNC: 47 U/L (ref 12–45)
BASOPHILS # BLD AUTO: 0.8 % (ref 0–1.8)
BASOPHILS # BLD: 0.07 K/UL (ref 0–0.12)
BILIRUB SERPL-MCNC: 0.8 MG/DL (ref 0.1–1.5)
BUN SERPL-MCNC: 23 MG/DL (ref 8–22)
CALCIUM ALBUM COR SERPL-MCNC: 9.8 MG/DL (ref 8.5–10.5)
CALCIUM SERPL-MCNC: 9.8 MG/DL (ref 8.4–10.2)
CHLORIDE SERPL-SCNC: 104 MMOL/L (ref 96–112)
CHOLEST SERPL-MCNC: 145 MG/DL (ref 100–199)
CO2 SERPL-SCNC: 19 MMOL/L (ref 20–33)
CREAT SERPL-MCNC: 1.34 MG/DL (ref 0.5–1.4)
EOSINOPHIL # BLD AUTO: 0.4 K/UL (ref 0–0.51)
EOSINOPHIL NFR BLD: 4.3 % (ref 0–6.9)
ERYTHROCYTE [DISTWIDTH] IN BLOOD BY AUTOMATED COUNT: 47.2 FL (ref 35.9–50)
EST. AVERAGE GLUCOSE BLD GHB EST-MCNC: 166 MG/DL
FASTING STATUS PATIENT QL REPORTED: NORMAL
GFR SERPLBLD CREATININE-BSD FMLA CKD-EPI: 60 ML/MIN/1.73 M 2
GLOBULIN SER CALC-MCNC: 3 G/DL (ref 1.9–3.5)
GLUCOSE SERPL-MCNC: 115 MG/DL (ref 65–99)
HBA1C MFR BLD: 7.4 % (ref 4–5.6)
HCT VFR BLD AUTO: 59.5 % (ref 42–52)
HDLC SERPL-MCNC: 31 MG/DL
HGB BLD-MCNC: 19.2 G/DL (ref 14–18)
IMM GRANULOCYTES # BLD AUTO: 0.21 K/UL (ref 0–0.11)
IMM GRANULOCYTES NFR BLD AUTO: 2.3 % (ref 0–0.9)
LDLC SERPL CALC-MCNC: 69 MG/DL
LYMPHOCYTES # BLD AUTO: 1.71 K/UL (ref 1–4.8)
LYMPHOCYTES NFR BLD: 18.4 % (ref 22–41)
MCH RBC QN AUTO: 27.5 PG (ref 27–33)
MCHC RBC AUTO-ENTMCNC: 32.3 G/DL (ref 33.7–35.3)
MCV RBC AUTO: 85.4 FL (ref 81.4–97.8)
MONOCYTES # BLD AUTO: 0.87 K/UL (ref 0–0.85)
MONOCYTES NFR BLD AUTO: 9.4 % (ref 0–13.4)
NEUTROPHILS # BLD AUTO: 6.04 K/UL (ref 1.82–7.42)
NEUTROPHILS NFR BLD: 64.8 % (ref 44–72)
NRBC # BLD AUTO: 0 K/UL
NRBC BLD-RTO: 0 /100 WBC
PLATELET # BLD AUTO: 168 K/UL (ref 164–446)
PMV BLD AUTO: 9.7 FL (ref 9–12.9)
POTASSIUM SERPL-SCNC: 4.4 MMOL/L (ref 3.6–5.5)
PROT SERPL-MCNC: 7 G/DL (ref 6–8.2)
RBC # BLD AUTO: 6.97 M/UL (ref 4.7–6.1)
SODIUM SERPL-SCNC: 136 MMOL/L (ref 135–145)
TRIGL SERPL-MCNC: 225 MG/DL (ref 0–149)
WBC # BLD AUTO: 9.3 K/UL (ref 4.8–10.8)

## 2023-04-06 PROCEDURE — 85025 COMPLETE CBC W/AUTO DIFF WBC: CPT

## 2023-04-06 PROCEDURE — 36415 COLL VENOUS BLD VENIPUNCTURE: CPT

## 2023-04-06 PROCEDURE — 83036 HEMOGLOBIN GLYCOSYLATED A1C: CPT

## 2023-04-06 PROCEDURE — 80061 LIPID PANEL: CPT

## 2023-04-06 PROCEDURE — 80053 COMPREHEN METABOLIC PANEL: CPT

## 2023-04-06 PROCEDURE — 82570 ASSAY OF URINE CREATININE: CPT

## 2023-04-06 PROCEDURE — 82043 UR ALBUMIN QUANTITATIVE: CPT

## 2023-04-07 LAB
CREAT UR-MCNC: 112.52 MG/DL
MICROALBUMIN UR-MCNC: 253 MG/DL
MICROALBUMIN/CREAT UR: 2248 MG/G (ref 0–30)

## 2023-05-26 ENCOUNTER — HOSPITAL ENCOUNTER (OUTPATIENT)
Dept: LAB | Facility: MEDICAL CENTER | Age: 63
End: 2023-05-26
Attending: FAMILY MEDICINE
Payer: COMMERCIAL

## 2023-05-26 LAB
ALBUMIN SERPL BCP-MCNC: 4.1 G/DL (ref 3.2–4.9)
ALBUMIN/GLOB SERPL: 1.6 G/DL
ALP SERPL-CCNC: 93 U/L (ref 30–99)
ALT SERPL-CCNC: 48 U/L (ref 2–50)
ANION GAP SERPL CALC-SCNC: 16 MMOL/L (ref 7–16)
AST SERPL-CCNC: 42 U/L (ref 12–45)
BASOPHILS # BLD AUTO: 0.8 % (ref 0–1.8)
BASOPHILS # BLD: 0.07 K/UL (ref 0–0.12)
BILIRUB SERPL-MCNC: 0.7 MG/DL (ref 0.1–1.5)
BUN SERPL-MCNC: 23 MG/DL (ref 8–22)
CALCIUM ALBUM COR SERPL-MCNC: 9.3 MG/DL (ref 8.5–10.5)
CALCIUM SERPL-MCNC: 9.4 MG/DL (ref 8.4–10.2)
CHLORIDE SERPL-SCNC: 104 MMOL/L (ref 96–112)
CHOLEST SERPL-MCNC: 89 MG/DL (ref 100–199)
CO2 SERPL-SCNC: 18 MMOL/L (ref 20–33)
CREAT SERPL-MCNC: 1.31 MG/DL (ref 0.5–1.4)
EOSINOPHIL # BLD AUTO: 0.18 K/UL (ref 0–0.51)
EOSINOPHIL NFR BLD: 2 % (ref 0–6.9)
ERYTHROCYTE [DISTWIDTH] IN BLOOD BY AUTOMATED COUNT: 47.1 FL (ref 35.9–50)
EST. AVERAGE GLUCOSE BLD GHB EST-MCNC: 163 MG/DL
FASTING STATUS PATIENT QL REPORTED: NORMAL
GFR SERPLBLD CREATININE-BSD FMLA CKD-EPI: 62 ML/MIN/1.73 M 2
GLOBULIN SER CALC-MCNC: 2.6 G/DL (ref 1.9–3.5)
GLUCOSE SERPL-MCNC: 124 MG/DL (ref 65–99)
HBA1C MFR BLD: 7.3 % (ref 4–5.6)
HCT VFR BLD AUTO: 59.4 % (ref 42–52)
HDLC SERPL-MCNC: 32 MG/DL
HGB BLD-MCNC: 19.2 G/DL (ref 14–18)
IMM GRANULOCYTES # BLD AUTO: 0.14 K/UL (ref 0–0.11)
IMM GRANULOCYTES NFR BLD AUTO: 1.6 % (ref 0–0.9)
LDLC SERPL CALC-MCNC: 0 MG/DL
LYMPHOCYTES # BLD AUTO: 1.09 K/UL (ref 1–4.8)
LYMPHOCYTES NFR BLD: 12.2 % (ref 22–41)
MCH RBC QN AUTO: 27.7 PG (ref 27–33)
MCHC RBC AUTO-ENTMCNC: 32.3 G/DL (ref 32.3–36.5)
MCV RBC AUTO: 85.8 FL (ref 81.4–97.8)
MONOCYTES # BLD AUTO: 0.87 K/UL (ref 0–0.85)
MONOCYTES NFR BLD AUTO: 9.7 % (ref 0–13.4)
NEUTROPHILS # BLD AUTO: 6.59 K/UL (ref 1.82–7.42)
NEUTROPHILS NFR BLD: 73.7 % (ref 44–72)
NRBC # BLD AUTO: 0 K/UL
NRBC BLD-RTO: 0 /100 WBC (ref 0–0.2)
PLATELET # BLD AUTO: 168 K/UL (ref 164–446)
PMV BLD AUTO: 10.7 FL (ref 9–12.9)
POTASSIUM SERPL-SCNC: 4.4 MMOL/L (ref 3.6–5.5)
PROT SERPL-MCNC: 6.7 G/DL (ref 6–8.2)
RBC # BLD AUTO: 6.92 M/UL (ref 4.7–6.1)
SODIUM SERPL-SCNC: 138 MMOL/L (ref 135–145)
TRIGL SERPL-MCNC: 283 MG/DL (ref 0–149)
WBC # BLD AUTO: 8.9 K/UL (ref 4.8–10.8)

## 2023-05-26 PROCEDURE — 82570 ASSAY OF URINE CREATININE: CPT

## 2023-05-26 PROCEDURE — 80061 LIPID PANEL: CPT

## 2023-05-26 PROCEDURE — 85025 COMPLETE CBC W/AUTO DIFF WBC: CPT

## 2023-05-26 PROCEDURE — 80053 COMPREHEN METABOLIC PANEL: CPT

## 2023-05-26 PROCEDURE — 82043 UR ALBUMIN QUANTITATIVE: CPT

## 2023-05-26 PROCEDURE — 36415 COLL VENOUS BLD VENIPUNCTURE: CPT

## 2023-05-26 PROCEDURE — 83036 HEMOGLOBIN GLYCOSYLATED A1C: CPT

## 2023-05-27 LAB
CREAT UR-MCNC: 89.42 MG/DL
MICROALBUMIN UR-MCNC: 214.4 MG/DL
MICROALBUMIN/CREAT UR: 2398 MG/G (ref 0–30)

## 2023-06-04 DIAGNOSIS — I10 ESSENTIAL HYPERTENSION: ICD-10-CM

## 2023-06-04 DIAGNOSIS — I25.119 CORONARY ARTERY DISEASE INVOLVING NATIVE CORONARY ARTERY OF NATIVE HEART WITH ANGINA PECTORIS (HCC): ICD-10-CM

## 2023-06-04 DIAGNOSIS — E78.5 DYSLIPIDEMIA: ICD-10-CM

## 2023-06-05 RX ORDER — ROSUVASTATIN CALCIUM 10 MG/1
10 TABLET, COATED ORAL EVERY EVENING
Qty: 90 TABLET | Refills: 3 | Status: SHIPPED | OUTPATIENT
Start: 2023-06-05 | End: 2023-06-13

## 2023-06-05 RX ORDER — ATENOLOL 25 MG/1
25 TABLET ORAL DAILY
Qty: 90 TABLET | Refills: 3 | Status: SHIPPED | OUTPATIENT
Start: 2023-06-05

## 2023-06-05 RX ORDER — LOSARTAN POTASSIUM 50 MG/1
50 TABLET ORAL
Qty: 90 TABLET | Refills: 3 | Status: SHIPPED | OUTPATIENT
Start: 2023-06-05 | End: 2023-06-13

## 2023-06-05 NOTE — TELEPHONE ENCOUNTER
Is the patient due for a refill? Yes    Was the patient seen the past year? Yes    Date of last office visit: 12/5/22    Does the patient have an upcoming appointment?  Yes   If yes, When? 6/13/23    Provider to refill: ELIOT    Does the patients insurance require a 100 day supply?  No

## 2023-06-13 ENCOUNTER — OFFICE VISIT (OUTPATIENT)
Dept: CARDIOLOGY | Facility: MEDICAL CENTER | Age: 63
End: 2023-06-13
Attending: INTERNAL MEDICINE
Payer: COMMERCIAL

## 2023-06-13 VITALS
DIASTOLIC BLOOD PRESSURE: 70 MMHG | OXYGEN SATURATION: 96 % | SYSTOLIC BLOOD PRESSURE: 132 MMHG | RESPIRATION RATE: 16 BRPM | HEART RATE: 80 BPM | HEIGHT: 65 IN | BODY MASS INDEX: 32.99 KG/M2 | WEIGHT: 198 LBS

## 2023-06-13 DIAGNOSIS — I25.10 CORONARY ARTERY DISEASE DUE TO CALCIFIED CORONARY LESION: ICD-10-CM

## 2023-06-13 DIAGNOSIS — Z95.1 S/P CABG (CORONARY ARTERY BYPASS GRAFT): Chronic | ICD-10-CM

## 2023-06-13 DIAGNOSIS — E78.5 DYSLIPIDEMIA: ICD-10-CM

## 2023-06-13 DIAGNOSIS — I10 ESSENTIAL HYPERTENSION: ICD-10-CM

## 2023-06-13 DIAGNOSIS — E66.09 CLASS 1 OBESITY DUE TO EXCESS CALORIES WITHOUT SERIOUS COMORBIDITY WITH BODY MASS INDEX (BMI) OF 32.0 TO 32.9 IN ADULT: ICD-10-CM

## 2023-06-13 DIAGNOSIS — I25.84 CORONARY ARTERY DISEASE DUE TO CALCIFIED CORONARY LESION: ICD-10-CM

## 2023-06-13 DIAGNOSIS — E11.9 TYPE 2 DIABETES MELLITUS WITHOUT COMPLICATION, WITHOUT LONG-TERM CURRENT USE OF INSULIN (HCC): ICD-10-CM

## 2023-06-13 DIAGNOSIS — D69.6 THROMBOCYTOPENIA (HCC): ICD-10-CM

## 2023-06-13 PROCEDURE — 3078F DIAST BP <80 MM HG: CPT | Performed by: INTERNAL MEDICINE

## 2023-06-13 PROCEDURE — 3075F SYST BP GE 130 - 139MM HG: CPT | Performed by: INTERNAL MEDICINE

## 2023-06-13 PROCEDURE — 99214 OFFICE O/P EST MOD 30 MIN: CPT | Performed by: INTERNAL MEDICINE

## 2023-06-13 PROCEDURE — 99212 OFFICE O/P EST SF 10 MIN: CPT | Performed by: INTERNAL MEDICINE

## 2023-06-13 PROCEDURE — 99213 OFFICE O/P EST LOW 20 MIN: CPT | Performed by: INTERNAL MEDICINE

## 2023-06-13 RX ORDER — LOSARTAN POTASSIUM 100 MG/1
100 TABLET ORAL
Qty: 100 TABLET | Refills: 3 | Status: SHIPPED | OUTPATIENT
Start: 2023-06-13

## 2023-06-13 RX ORDER — SEMAGLUTIDE 0.68 MG/ML
INJECTION, SOLUTION SUBCUTANEOUS
COMMUNITY
Start: 2023-06-07

## 2023-06-13 RX ORDER — EVOLOCUMAB 140 MG/ML
INJECTION, SOLUTION SUBCUTANEOUS
Qty: 6 EACH | Refills: 3 | Status: SHIPPED | OUTPATIENT
Start: 2023-06-13 | End: 2023-12-04 | Stop reason: SDUPTHER

## 2023-06-13 ASSESSMENT — FIBROSIS 4 INDEX: FIB4 SCORE: 2.2

## 2023-06-13 NOTE — PATIENT INSTRUCTIONS
Nasal swab completed and sent to lab for rapid analysis. NS bolus initiated and infusing now.   Please increase losartan to 100mg twice a day.

## 2023-06-13 NOTE — PROGRESS NOTES
Cardiology Follow-up Consultation Note    Date of note:  6/13/2023  Primary Care Provider: Jacey Elizondo M.D.  Referring Provider: Tez Tena M.D.     Patient Name: Severo Coleman   YOB: 1960  MRN:              [id number]    Chief Complaint: CAD    History of Present Illness: Severo Coleman is a 62 y.o. male whose current medical problems include diabetes, DAVID on CPAP, CAD s/p CABG, hypertension, dyslipidemia who is here for follow-up.    At our visit, 7/2/2020:  Liver enzymes elevated for years, relatively stable.     Started praluent 1 year ago.     At our visit, 4/22/2021:  Got J+J vaccine last week.     In terms of CAD, no angina. Does 20-30 minutes on the treadmill around 3 times a week.     Diabetes worsening.     At our visit, 6/2/2022:  Exercising on treadmill 2-3 times a week for 30 minutes.     In terms of hypertension, BP at home in the 120s.     Diabetes stable.     At our visit, 12/5/2022:  Exercises 2-3 times a week, 30 minutes.     Diabetes worsening, worsening peripheral neuropathy.     Interim Events:  Still exercising 3-4 days a week on treadmill for 30 minutes.     Diabetes improving, on ozempic.     BP in the 120s-130s.     In terms of DAVID, on CPAP.     ROS  + back pain, cough, allergies.   + Bilateral toe numbness.     Constitution: Negative for chills, fever and night sweats.   HENT: Negative for nosebleeds.    Eyes: Negative for vision loss in left eye and vision loss in right eye.   Respiratory: Negative for hemoptysis.    Gastrointestinal: Negative for hematemesis, hematochezia and melena.   Genitourinary: Negative for hematuria.   Neurological: Negative for focal weakness, numbness and paresthesias.     All other systems reviewed and discussed using a comprehensive questionnaire and are negative.         Past Medical History:   Diagnosis Date    CAD (coronary artery disease)     Congestive heart failure (HCC)     Heart valve disease     High  cholesterol     HTN (hypertension) 6/30/2014    Hyperlipidemia 5/14/2012    Kidney stone     Liver disease     Myocardial infarct (HCC) 1998    Obesity 5/14/2012    Renal disorder     kidney stones    S/P CABG (coronary artery bypass graft) 1998    3 vessel         Past Surgical History:   Procedure Laterality Date    CARDIAC CATH, LEFT HEART  2007    open LIMA to LAD and SVG to Diag which was supplying RCA and LCx by collaterals.     MULTIPLE CORONARY ARTERY BYPASS  1998    3 vessel, per previous cardiology notes (unable to find records of op report), LIMA to LAD, SVG to inferior marginal (occluded), and SVG to Diagonal artery         Current Outpatient Medications   Medication Sig Dispense Refill    OZEMPIC, 0.25 OR 0.5 MG/DOSE, 2 MG/3ML Solution Pen-injector INJECT 0.5MG SUBCUTANEOUSLY ONCE PER WEEK      Evolocumab, REPATHA, (REPATHA SURECLICK) 140 MG/ML Solution Auto-injector Inject 1 pen (140mg) under the skin every 14 days. 6 Each 3    atenolol (TENORMIN) 25 MG Tab TAKE 1 TABLET BY MOUTH EVERY DAY 90 Tablet 3    losartan (COZAAR) 50 MG Tab TAKE 1 TABLET BY MOUTH EVERY DAY 90 Tablet 3    metFORMIN ER (GLUCOPHAGE XR) 500 MG TABLET SR 24 HR Take 2 Tablets by mouth 2 times a day. 360 Tablet 0    FARXIGA 10 MG Tab TAKE 1 TABLET BY MOUTH EVERY DAY. REPLACES 5 MG TABLET      Cholecalciferol (VITAMIN D) 125 MCG (5000 UT) Cap Take 5,000 Units by mouth every day.      finasteride (PROSCAR) 5 MG Tab Take 5 mg by mouth every day.      folic acid (FOLVITE) 1 MG Tab Take 2 Tabs by mouth 2 Times a Day. 120 Tab 11    Pyridoxine HCl (VITAMIN B6 PO) Take  by mouth every day.      Coenzyme Q10 (CO Q-10 PO) Take  by mouth every day.      cyanocobalamin (VITAMIN B-12) 100 MCG TABS Take 100 mcg by mouth every day.        aspirin 81 MG tablet Take 81 mg by mouth every day.       No current facility-administered medications for this visit.         No Known Allergies      Family History   Problem Relation Age of Onset    Heart Attack  "Mother     Sleep Apnea Brother          Social History     Socioeconomic History    Marital status: Single     Spouse name: Not on file    Number of children: Not on file    Years of education: Not on file    Highest education level: Not on file   Occupational History    Not on file   Tobacco Use    Smoking status: Never    Smokeless tobacco: Never   Vaping Use    Vaping Use: Never used   Substance and Sexual Activity    Alcohol use: No    Drug use: No    Sexual activity: Not on file   Other Topics Concern    Not on file   Social History Narrative    Works at [employer].      Social Determinants of Health     Financial Resource Strain: Not on file   Food Insecurity: Not on file   Transportation Needs: Not on file   Physical Activity: Not on file   Stress: Not on file   Social Connections: Not on file   Intimate Partner Violence: Not on file   Housing Stability: Not on file         Physical Exam:  Ambulatory Vitals  /70 (BP Location: Left arm, Patient Position: Sitting)   Pulse 80   Resp 16   Ht 1.651 m (5' 5\")   Wt 89.8 kg (198 lb)   SpO2 96%    Oxygen Therapy:  Pulse Oximetry: 96 %  BP Readings from Last 4 Encounters:   06/13/23 132/70   12/05/22 136/70   06/02/22 124/86   04/22/21 110/62       Weight/BMI: Body mass index is 32.95 kg/m².  Wt Readings from Last 4 Encounters:   06/13/23 89.8 kg (198 lb)   12/05/22 96.6 kg (213 lb)   06/02/22 98 kg (216 lb)   04/22/21 98 kg (216 lb 0.8 oz)     General: No apparent distress  Eyes: nl conjunctiva  ENT: OP covered by mask  Neck: JVP <8 cm H2O  Lungs: normal respiratory effort, CTAB  Heart: RRR, no murmurs, no rubs or gallops, no edema bilateral lower extremities. No LV/RV heave on cardiac palpatation. 2+ bilateral radial pulses.   Abdomen: soft, non tender, non distended, no masses, normal bowel sounds.  No HSM.  Extremities/MSK: no clubbing, no cyanosis  Neurological: No focal sensory deficits  Psychiatric: Appropriate affect, A/O x 3, intact judgement and " insight  Skin: Warm extremities    Exam repeated in full and unchanged except for as noted above.        Lab Data Review:  Lab Results   Component Value Date/Time    CHOLSTRLTOT 89 (L) 05/26/2023 09:34 AM    LDL 0 05/26/2023 09:34 AM    HDL 32 (A) 05/26/2023 09:34 AM    TRIGLYCERIDE 283 (H) 05/26/2023 09:34 AM       Lab Results   Component Value Date/Time    SODIUM 138 05/26/2023 09:34 AM    POTASSIUM 4.4 05/26/2023 09:34 AM    CHLORIDE 104 05/26/2023 09:34 AM    CO2 18 (L) 05/26/2023 09:34 AM    GLUCOSE 124 (H) 05/26/2023 09:34 AM    BUN 23 (H) 05/26/2023 09:34 AM    CREATININE 1.31 05/26/2023 09:34 AM     Lab Results   Component Value Date/Time    ALKPHOSPHAT 93 05/26/2023 09:34 AM    ASTSGOT 42 05/26/2023 09:34 AM    ALTSGPT 48 05/26/2023 09:34 AM    TBILIRUBIN 0.7 05/26/2023 09:34 AM      Lab Results   Component Value Date/Time    WBC 8.9 05/26/2023 09:34 AM     No components found for: HBGA1C  No components found for: TROPONIN  No results found for: BNP      Cardiac Imaging and Procedures Review:    EKG dated 7/2/2020 : My personal interpretation is sinus bradycardia, previously large anteroseptal-lateral infarct, LAD, TOMAS, LVH. Posterior axis.       Echo dated 7/28/2020:  CONCLUSIONS  Normal right and left ventricular size and function.   Mild concentric left ventricular hypertrophy.  Wall motion not well visualized however appeared grossly normal.  No significant valvular stenosis or regurgitation.   Normal estiamted right atrial pressure.  Unable to estimate pulmonary artery pressure due to an inadequate   tricuspid regurgitant jet.     Compared to the previous echocardiogram performed on 08/26/2013: There   has been no significant change.     Normal left ventricular chamber size. Mild concentric left ventricular   hypertrophy. Left ventricular ejection fraction is visually estimated   to be 60%. Wall motion not well visualized however appeared grossly   normal. Normal diastolic function.      Echo dated  2013:   CONCLUSIONS   Normal left ventricular systolic function.   Mild to moderate concentric left ventricular hypertrophy.   Left ventricular ejection fraction is 55% to 60%.   No significant valve abnormalities.     Radiology test Review:  CXR:   FINDINGS:  The patient is status post CABG. The cardiac silhouette is enlarged.  No pulmonary infiltrates or consolidations are noted.  No pleural effusions are appreciated.    Liver ultrasound 2016:  IMPRESSION:     1.  Cholelithiasis.  2.  Increased liver echogenicity consistent with hepatic steatosis.  3.  8 mm cyst in left lobe of liver.  4.  3.2 cm cyst in the right kidney.    Liver Biopsy 2016:  A. Liver Biopsy:          Benign liver tissue showing moderate fatty change (approximately           35% of hepatocytes show steatosis).          The lobules also show mild lobular chronic inflammation, slight           acute inflammation and rare degenerated hepatocytes.          The portal tracts show mild chronic inflammation, minimal acute           inflammation and rare eosinophils; the bile ducts are           unremarkable except for one duct which shows focal acute           inflammation of its wall.          The trichrome and reticulin stains do not show significant           fibrosis or architectural distortion.          The iron stain does not demonstrate increased iron deposition     Comment: The findings fit for steatohepatitis without significant   fibrosis.  Drugs and toxins are possible causes.  Clinical correlation   is suggested.  Clinical information from Dr. Hou's office has also   been reviewed in conjunction with the biopsy.       Medical Decision Makin. Coronary artery disease due to a calcified coronary lesion: Status post CABG x3 in .  Occluded SVG to OM with a patent LIMA to the LAD in   Per notes, appears LIMA and SVG to diag are open, however other notes say only LIMA is open. Currently asymptomatic, however EKG  showed quite extensive anterolateral infarct. Wall motion and LVEF were grossly normal 7/2020.   -continue aspirin 81mg PO Daily  -continue repatha  -continue atenolol, will consider switching to metoprolol in the future.     2. Dyslipidemia  Continue repatha as per above. Added crestor, stable LFTs, and now cholesterol well controlled. Started on PCSK9 inhibitor prior to me being his cardiologist, but presumed due to inadequate cholesterol control on statin alone.     3. Essential hypertension  Well controlled at home  -continue atenolol, losartan.     4. Obesity (BMI 30-39.9)  Weight loss  -diet/exercise.     5. DAVID (obstructive sleep apnea)  CPAP    6. Steatohepatitis  Concern for obesity related vs drug induced based on biopsy in 2016. Stable and improved so luckily not likely from PCSK9 inhibitors.   -referral for [facility].     7. Type 2 diabetes mellitus without complication, without long-term current use of insulin (HCC)  Per PCP and pharmacists, improving.   -f/u with Dr. Elizondo as scheduled.     8. Polycythemia - likely due to sleep apnea but worsening  -seen by Dr. Seaman at [facility]. Will request records.   -continue CPAP    9. Thrombocytopenia - f/u with heme.       Return in about 1 year (around 6/13/2024).       Ismael Magana MD, [facility]  [place] [facility], [facility]  [address] [address]  Phone: [phone]  Fax: [phone]

## 2023-07-27 ENCOUNTER — HOSPITAL ENCOUNTER (OUTPATIENT)
Dept: LAB | Facility: MEDICAL CENTER | Age: 63
End: 2023-07-27
Attending: PHYSICIAN ASSISTANT
Payer: COMMERCIAL

## 2023-07-27 LAB — PSA SERPL-MCNC: 0.54 NG/ML (ref 0–4)

## 2023-07-27 PROCEDURE — 84153 ASSAY OF PSA TOTAL: CPT

## 2023-07-27 PROCEDURE — 36415 COLL VENOUS BLD VENIPUNCTURE: CPT

## 2023-09-11 ENCOUNTER — HOSPITAL ENCOUNTER (OUTPATIENT)
Dept: LAB | Facility: MEDICAL CENTER | Age: 63
End: 2023-09-11
Attending: FAMILY MEDICINE
Payer: COMMERCIAL

## 2023-09-11 LAB
ALBUMIN SERPL BCP-MCNC: 4.3 G/DL (ref 3.2–4.9)
ALBUMIN/GLOB SERPL: 1.5 G/DL
ALP SERPL-CCNC: 104 U/L (ref 30–99)
ALT SERPL-CCNC: 51 U/L (ref 2–50)
ANION GAP SERPL CALC-SCNC: 15 MMOL/L (ref 7–16)
AST SERPL-CCNC: 35 U/L (ref 12–45)
BASOPHILS # BLD AUTO: 0.7 % (ref 0–1.8)
BASOPHILS # BLD: 0.06 K/UL (ref 0–0.12)
BILIRUB SERPL-MCNC: 0.9 MG/DL (ref 0.1–1.5)
BUN SERPL-MCNC: 26 MG/DL (ref 8–22)
CALCIUM ALBUM COR SERPL-MCNC: 9.4 MG/DL (ref 8.5–10.5)
CALCIUM SERPL-MCNC: 9.6 MG/DL (ref 8.4–10.2)
CHLORIDE SERPL-SCNC: 104 MMOL/L (ref 96–112)
CHOLEST SERPL-MCNC: 188 MG/DL (ref 100–199)
CO2 SERPL-SCNC: 18 MMOL/L (ref 20–33)
CREAT SERPL-MCNC: 1.29 MG/DL (ref 0.5–1.4)
EOSINOPHIL # BLD AUTO: 0.21 K/UL (ref 0–0.51)
EOSINOPHIL NFR BLD: 2.3 % (ref 0–6.9)
ERYTHROCYTE [DISTWIDTH] IN BLOOD BY AUTOMATED COUNT: 47.3 FL (ref 35.9–50)
EST. AVERAGE GLUCOSE BLD GHB EST-MCNC: 143 MG/DL
FASTING STATUS PATIENT QL REPORTED: NORMAL
GFR SERPLBLD CREATININE-BSD FMLA CKD-EPI: 63 ML/MIN/1.73 M 2
GLOBULIN SER CALC-MCNC: 2.8 G/DL (ref 1.9–3.5)
GLUCOSE SERPL-MCNC: 140 MG/DL (ref 65–99)
HBA1C MFR BLD: 6.6 % (ref 4–5.6)
HCT VFR BLD AUTO: 60.6 % (ref 42–52)
HDLC SERPL-MCNC: 33 MG/DL
HGB BLD-MCNC: 19.4 G/DL (ref 14–18)
IMM GRANULOCYTES # BLD AUTO: 0.2 K/UL (ref 0–0.11)
IMM GRANULOCYTES NFR BLD AUTO: 2.2 % (ref 0–0.9)
LDLC SERPL CALC-MCNC: 91 MG/DL
LYMPHOCYTES # BLD AUTO: 1.24 K/UL (ref 1–4.8)
LYMPHOCYTES NFR BLD: 13.6 % (ref 22–41)
MCH RBC QN AUTO: 27.2 PG (ref 27–33)
MCHC RBC AUTO-ENTMCNC: 32 G/DL (ref 32.3–36.5)
MCV RBC AUTO: 85.1 FL (ref 81.4–97.8)
MONOCYTES # BLD AUTO: 0.84 K/UL (ref 0–0.85)
MONOCYTES NFR BLD AUTO: 9.2 % (ref 0–13.4)
NEUTROPHILS # BLD AUTO: 6.55 K/UL (ref 1.82–7.42)
NEUTROPHILS NFR BLD: 72 % (ref 44–72)
NRBC # BLD AUTO: 0 K/UL
NRBC BLD-RTO: 0 /100 WBC (ref 0–0.2)
PLATELET # BLD AUTO: 191 K/UL (ref 164–446)
PMV BLD AUTO: 10.4 FL (ref 9–12.9)
POTASSIUM SERPL-SCNC: 4.4 MMOL/L (ref 3.6–5.5)
PROT SERPL-MCNC: 7.1 G/DL (ref 6–8.2)
RBC # BLD AUTO: 7.12 M/UL (ref 4.7–6.1)
SODIUM SERPL-SCNC: 137 MMOL/L (ref 135–145)
TRIGL SERPL-MCNC: 321 MG/DL (ref 0–149)
TSH SERPL DL<=0.005 MIU/L-ACNC: 2.05 UIU/ML (ref 0.38–5.33)
WBC # BLD AUTO: 9.1 K/UL (ref 4.8–10.8)

## 2023-09-11 PROCEDURE — 80053 COMPREHEN METABOLIC PANEL: CPT

## 2023-09-11 PROCEDURE — 80061 LIPID PANEL: CPT

## 2023-09-11 PROCEDURE — 83036 HEMOGLOBIN GLYCOSYLATED A1C: CPT

## 2023-09-11 PROCEDURE — 36415 COLL VENOUS BLD VENIPUNCTURE: CPT

## 2023-09-11 PROCEDURE — 85025 COMPLETE CBC W/AUTO DIFF WBC: CPT

## 2023-09-11 PROCEDURE — 84443 ASSAY THYROID STIM HORMONE: CPT

## 2023-09-13 ENCOUNTER — HOSPITAL ENCOUNTER (OUTPATIENT)
Facility: MEDICAL CENTER | Age: 63
End: 2023-09-13
Attending: FAMILY MEDICINE
Payer: COMMERCIAL

## 2023-09-13 LAB
CREAT UR-MCNC: 78.62 MG/DL
PROT UR-MCNC: 160 MG/DL (ref 0–15)
PROT/CREAT UR: 2035 MG/G (ref 15–68)

## 2023-09-13 PROCEDURE — 82570 ASSAY OF URINE CREATININE: CPT | Mod: 91

## 2023-09-13 PROCEDURE — 84156 ASSAY OF PROTEIN URINE: CPT

## 2023-09-13 PROCEDURE — 82043 UR ALBUMIN QUANTITATIVE: CPT

## 2023-09-14 LAB
CREAT UR-MCNC: 73.93 MG/DL
MICROALBUMIN UR-MCNC: <1.2 MG/DL
MICROALBUMIN/CREAT UR: NORMAL MG/G (ref 0–30)

## 2023-11-30 ENCOUNTER — HOSPITAL ENCOUNTER (OUTPATIENT)
Dept: LAB | Facility: MEDICAL CENTER | Age: 63
End: 2023-11-30
Attending: FAMILY MEDICINE
Payer: COMMERCIAL

## 2023-11-30 LAB
ALBUMIN SERPL BCP-MCNC: 4.3 G/DL (ref 3.2–4.9)
ALBUMIN/GLOB SERPL: 1.6 G/DL
ALP SERPL-CCNC: 101 U/L (ref 30–99)
ALT SERPL-CCNC: 59 U/L (ref 2–50)
ANION GAP SERPL CALC-SCNC: 12 MMOL/L (ref 7–16)
AST SERPL-CCNC: 50 U/L (ref 12–45)
BASOPHILS # BLD AUTO: 0.8 % (ref 0–1.8)
BASOPHILS # BLD: 0.07 K/UL (ref 0–0.12)
BILIRUB SERPL-MCNC: 0.6 MG/DL (ref 0.1–1.5)
BUN SERPL-MCNC: 21 MG/DL (ref 8–22)
CALCIUM ALBUM COR SERPL-MCNC: 9.1 MG/DL (ref 8.5–10.5)
CALCIUM SERPL-MCNC: 9.3 MG/DL (ref 8.4–10.2)
CHLORIDE SERPL-SCNC: 104 MMOL/L (ref 96–112)
CHOLEST SERPL-MCNC: 199 MG/DL (ref 100–199)
CO2 SERPL-SCNC: 18 MMOL/L (ref 20–33)
CREAT SERPL-MCNC: 1.38 MG/DL (ref 0.5–1.4)
CREAT UR-MCNC: 118.2 MG/DL
EOSINOPHIL # BLD AUTO: 0.2 K/UL (ref 0–0.51)
EOSINOPHIL NFR BLD: 2.3 % (ref 0–6.9)
ERYTHROCYTE [DISTWIDTH] IN BLOOD BY AUTOMATED COUNT: 47.5 FL (ref 35.9–50)
EST. AVERAGE GLUCOSE BLD GHB EST-MCNC: 143 MG/DL
FASTING STATUS PATIENT QL REPORTED: NORMAL
GFR SERPLBLD CREATININE-BSD FMLA CKD-EPI: 58 ML/MIN/1.73 M 2
GLOBULIN SER CALC-MCNC: 2.7 G/DL (ref 1.9–3.5)
GLUCOSE SERPL-MCNC: 114 MG/DL (ref 65–99)
HBA1C MFR BLD: 6.6 % (ref 4–5.6)
HCT VFR BLD AUTO: 61.3 % (ref 42–52)
HDLC SERPL-MCNC: 32 MG/DL
HGB BLD-MCNC: 19.7 G/DL (ref 14–18)
IMM GRANULOCYTES # BLD AUTO: 0.19 K/UL (ref 0–0.11)
IMM GRANULOCYTES NFR BLD AUTO: 2.2 % (ref 0–0.9)
LDLC SERPL CALC-MCNC: 124 MG/DL
LYMPHOCYTES # BLD AUTO: 1.39 K/UL (ref 1–4.8)
LYMPHOCYTES NFR BLD: 16 % (ref 22–41)
MCH RBC QN AUTO: 27.7 PG (ref 27–33)
MCHC RBC AUTO-ENTMCNC: 32.1 G/DL (ref 32.3–36.5)
MCV RBC AUTO: 86.2 FL (ref 81.4–97.8)
MICROALBUMIN UR-MCNC: >40 MG/DL
MICROALBUMIN/CREAT UR: 338 MG/G (ref 0–30)
MONOCYTES # BLD AUTO: 0.86 K/UL (ref 0–0.85)
MONOCYTES NFR BLD AUTO: 9.9 % (ref 0–13.4)
NEUTROPHILS # BLD AUTO: 5.96 K/UL (ref 1.82–7.42)
NEUTROPHILS NFR BLD: 68.8 % (ref 44–72)
NRBC # BLD AUTO: 0 K/UL
NRBC BLD-RTO: 0 /100 WBC (ref 0–0.2)
PLATELET # BLD AUTO: 183 K/UL (ref 164–446)
PMV BLD AUTO: 9.9 FL (ref 9–12.9)
POTASSIUM SERPL-SCNC: 4.5 MMOL/L (ref 3.6–5.5)
PROT SERPL-MCNC: 7 G/DL (ref 6–8.2)
RBC # BLD AUTO: 7.11 M/UL (ref 4.7–6.1)
SODIUM SERPL-SCNC: 134 MMOL/L (ref 135–145)
TRIGL SERPL-MCNC: 213 MG/DL (ref 0–149)
WBC # BLD AUTO: 8.7 K/UL (ref 4.8–10.8)

## 2023-11-30 PROCEDURE — 82043 UR ALBUMIN QUANTITATIVE: CPT

## 2023-11-30 PROCEDURE — 36415 COLL VENOUS BLD VENIPUNCTURE: CPT

## 2023-11-30 PROCEDURE — 85025 COMPLETE CBC W/AUTO DIFF WBC: CPT

## 2023-11-30 PROCEDURE — 80053 COMPREHEN METABOLIC PANEL: CPT

## 2023-11-30 PROCEDURE — 82570 ASSAY OF URINE CREATININE: CPT

## 2023-11-30 PROCEDURE — 83036 HEMOGLOBIN GLYCOSYLATED A1C: CPT

## 2023-11-30 PROCEDURE — 80061 LIPID PANEL: CPT

## 2023-12-04 DIAGNOSIS — E78.5 DYSLIPIDEMIA: ICD-10-CM

## 2023-12-04 NOTE — TELEPHONE ENCOUNTER
ELIOT    Received request via: Patient    Was the patient seen in the last year in this department? Yes    Does the patient have an active prescription (recently filled or refills available) for medication(s) requested? Yes    Does the patient have USP Plus and need 100 day supply (blood pressure, diabetes and cholesterol meds only)? Patient does not have SCP    Thank you,     Sabas HERRMANN

## 2023-12-06 ENCOUNTER — TELEPHONE (OUTPATIENT)
Dept: CARDIOLOGY | Facility: MEDICAL CENTER | Age: 63
End: 2023-12-06
Payer: COMMERCIAL

## 2023-12-06 ENCOUNTER — TELEPHONE (OUTPATIENT)
Dept: PHARMACY | Facility: MEDICAL CENTER | Age: 63
End: 2023-12-06
Payer: COMMERCIAL

## 2023-12-06 RX ORDER — EVOLOCUMAB 140 MG/ML
INJECTION, SOLUTION SUBCUTANEOUS
Qty: 6 EACH | Refills: 1 | Status: SHIPPED | OUTPATIENT
Start: 2023-12-06

## 2023-12-06 NOTE — TELEPHONE ENCOUNTER
To RX Coordinators: We received a fax from pt's pharmacy ([facility]) requesting a PA for Repatha. Would you be able to help this? I have the physical fax if needed. Thank you!

## 2023-12-06 NOTE — TELEPHONE ENCOUNTER
Received [employer] PA request via [employer]  for REPATHA SURECLICK 140 MG/ML Solution Auto-injector SubQ injection pen . (Quantity:2, Day Supply:28)     Insurance: [employer] / [employer]  Member ID:  [id number]  BIN: [id number]  PCN: [id number]  Group: [id number]     Ran Test claim via [place] & medication Rejects stating prior authorization is required.    Insurance only allows for 30 days fill at a time.

## 2023-12-06 NOTE — TELEPHONE ENCOUNTER
Unable to submit PA via CMM. [facility] insurance requires verbal PA's via phone at [phone] to submit.  ID: [id number]

## 2023-12-06 NOTE — TELEPHONE ENCOUNTER
Prior Authorization for Repatha 140mg/mL auto injector pens (Quantity: 6, Days: 84) has been submitted via Phone: [phone]    Insurance: [facility]    Will follow up in 24-48 business hours.     **Waiting for Clinical questions to be faxed from insurance per Rita (rep) at [facility]**

## 2023-12-06 NOTE — TELEPHONE ENCOUNTER
ELIOT    Caller:  Severo    Medication Name and Dosage:    Evolocumab, REPATHA, (REPATHA SURECLICK) 140 MG/ML Solution Auto-injector [[id number]]     Medication amount left: 0    Preferred Pharmacy:   [facility]    Other questions (Topic):  -  Having issues with Insurance and refills?    Callback Number (Will only call for issues): [phone]    Thank you,   Batsheva OSULLIVAN

## 2023-12-06 NOTE — TELEPHONE ENCOUNTER
Nury Tolentino; Cardiology [facility] [id number] minutes ago (9:28 AM)     Good morning Ethyl,    I am pending the call to this patient's insurance to initiate the prior authorization for their Repatha. I am on the phone right now with a Patient Assistance Program and I will give the insurance a call afterwards. Unfortunately, this patients insurance will not allow for electronic submission of their PA so I will give them a call.    Thank you!    Nury VALENZUELA  Rx Coordinator       Response noted.

## 2023-12-06 NOTE — TELEPHONE ENCOUNTER
Clinical questions received via fax for Repatha PA. Filled out and sent to [facility] via fax @ [phone] with chart notes and recent labs attached.

## 2023-12-11 NOTE — TELEPHONE ENCOUNTER
Called [facility] at [phone], s/w Shaista,    Prior Authorization for Repatha SureClick 140 mg/ml Sol Auto-inj has been approved for a quantity of 2mls , day supply 28    Insurance-[facility]    Dates in effect, from 12/07/23 through 12/07/24    Co-pay: Lock out to [facility]     Pharmacy and phone number     [facility]  [address] [address]  Phone: [phone]    Fax: [phone]    Is patient eligible to fill with [facility]? No, test claim rejected stating Lock out to [facility].    Next Steps:  Routing approval to liaisons    Approval Letter  Verbal approval via Shaista at [employer] at [phone]

## 2024-02-28 ENCOUNTER — HOSPITAL ENCOUNTER (OUTPATIENT)
Dept: LAB | Facility: MEDICAL CENTER | Age: 64
End: 2024-02-28
Attending: FAMILY MEDICINE

## 2024-02-28 LAB
25(OH)D3 SERPL-MCNC: 62 NG/ML (ref 30–100)
ALBUMIN SERPL BCP-MCNC: 4.4 G/DL (ref 3.2–4.9)
ALBUMIN/GLOB SERPL: 1.5 G/DL
ALP SERPL-CCNC: 107 U/L (ref 30–99)
ALT SERPL-CCNC: 44 U/L (ref 2–50)
ANION GAP SERPL CALC-SCNC: 13 MMOL/L (ref 7–16)
APPEARANCE UR: CLEAR
AST SERPL-CCNC: 35 U/L (ref 12–45)
BACTERIA #/AREA URNS HPF: ABNORMAL /HPF
BASOPHILS # BLD AUTO: 0.6 % (ref 0–1.8)
BASOPHILS # BLD: 0.06 K/UL (ref 0–0.12)
BILIRUB SERPL-MCNC: 0.6 MG/DL (ref 0.1–1.5)
BILIRUB UR QL STRIP.AUTO: NEGATIVE
BUN SERPL-MCNC: 27 MG/DL (ref 8–22)
CALCIUM ALBUM COR SERPL-MCNC: 9.4 MG/DL (ref 8.5–10.5)
CALCIUM SERPL-MCNC: 9.7 MG/DL (ref 8.4–10.2)
CHLORIDE SERPL-SCNC: 102 MMOL/L (ref 96–112)
CHOLEST SERPL-MCNC: 172 MG/DL (ref 100–199)
CO2 SERPL-SCNC: 23 MMOL/L (ref 20–33)
COLOR UR: YELLOW
CREAT SERPL-MCNC: 1.63 MG/DL (ref 0.5–1.4)
CREAT UR-MCNC: 85.55 MG/DL
EOSINOPHIL # BLD AUTO: 0.22 K/UL (ref 0–0.51)
EOSINOPHIL NFR BLD: 2.3 % (ref 0–6.9)
EPI CELLS #/AREA URNS HPF: NEGATIVE /HPF
ERYTHROCYTE [DISTWIDTH] IN BLOOD BY AUTOMATED COUNT: 47.5 FL (ref 35.9–50)
EST. AVERAGE GLUCOSE BLD GHB EST-MCNC: 140 MG/DL
FASTING STATUS PATIENT QL REPORTED: NORMAL
GFR SERPLBLD CREATININE-BSD FMLA CKD-EPI: 47 ML/MIN/1.73 M 2
GLOBULIN SER CALC-MCNC: 2.9 G/DL (ref 1.9–3.5)
GLUCOSE SERPL-MCNC: 108 MG/DL (ref 65–99)
GLUCOSE UR STRIP.AUTO-MCNC: 500 MG/DL
HBA1C MFR BLD: 6.5 % (ref 4–5.6)
HCT VFR BLD AUTO: 62.4 % (ref 42–52)
HDLC SERPL-MCNC: 34 MG/DL
HGB BLD-MCNC: 20 G/DL (ref 14–18)
HYALINE CASTS #/AREA URNS LPF: ABNORMAL /LPF
IMM GRANULOCYTES # BLD AUTO: 0.23 K/UL (ref 0–0.11)
IMM GRANULOCYTES NFR BLD AUTO: 2.4 % (ref 0–0.9)
KETONES UR STRIP.AUTO-MCNC: NEGATIVE MG/DL
LDLC SERPL CALC-MCNC: 92 MG/DL
LEUKOCYTE ESTERASE UR QL STRIP.AUTO: NEGATIVE
LYMPHOCYTES # BLD AUTO: 1.47 K/UL (ref 1–4.8)
LYMPHOCYTES NFR BLD: 15.5 % (ref 22–41)
MCH RBC QN AUTO: 27.7 PG (ref 27–33)
MCHC RBC AUTO-ENTMCNC: 32.1 G/DL (ref 32.3–36.5)
MCV RBC AUTO: 86.4 FL (ref 81.4–97.8)
MICRO URNS: ABNORMAL
MICROALBUMIN UR-MCNC: 135.7 MG/DL
MICROALBUMIN/CREAT UR: 1586 MG/G (ref 0–30)
MONOCYTES # BLD AUTO: 0.83 K/UL (ref 0–0.85)
MONOCYTES NFR BLD AUTO: 8.7 % (ref 0–13.4)
MUCOUS THREADS #/AREA URNS HPF: ABNORMAL /HPF
NEUTROPHILS # BLD AUTO: 6.69 K/UL (ref 1.82–7.42)
NEUTROPHILS NFR BLD: 70.5 % (ref 44–72)
NITRITE UR QL STRIP.AUTO: NEGATIVE
NRBC # BLD AUTO: 0 K/UL
NRBC BLD-RTO: 0 /100 WBC (ref 0–0.2)
PH UR STRIP.AUTO: 5.5 [PH] (ref 5–8)
PLATELET # BLD AUTO: 180 K/UL (ref 164–446)
PMV BLD AUTO: 10.6 FL (ref 9–12.9)
POTASSIUM SERPL-SCNC: 4.7 MMOL/L (ref 3.6–5.5)
PROT SERPL-MCNC: 7.3 G/DL (ref 6–8.2)
PROT UR QL STRIP: >=300 MG/DL
RBC # BLD AUTO: 7.22 M/UL (ref 4.7–6.1)
RBC # URNS HPF: ABNORMAL /HPF
RBC UR QL AUTO: ABNORMAL
SODIUM SERPL-SCNC: 138 MMOL/L (ref 135–145)
SP GR UR STRIP.AUTO: 1.02
TRIGL SERPL-MCNC: 228 MG/DL (ref 0–149)
TSH SERPL DL<=0.005 MIU/L-ACNC: 3.68 UIU/ML (ref 0.38–5.33)
UNIDENT CRYS URNS QL MICRO: ABNORMAL /HPF
WBC # BLD AUTO: 9.5 K/UL (ref 4.8–10.8)
WBC #/AREA URNS HPF: ABNORMAL /HPF

## 2024-02-28 PROCEDURE — 82306 VITAMIN D 25 HYDROXY: CPT

## 2024-02-28 PROCEDURE — 83036 HEMOGLOBIN GLYCOSYLATED A1C: CPT

## 2024-02-28 PROCEDURE — 82043 UR ALBUMIN QUANTITATIVE: CPT

## 2024-02-28 PROCEDURE — 36415 COLL VENOUS BLD VENIPUNCTURE: CPT

## 2024-02-28 PROCEDURE — 80053 COMPREHEN METABOLIC PANEL: CPT

## 2024-02-28 PROCEDURE — 85025 COMPLETE CBC W/AUTO DIFF WBC: CPT

## 2024-02-28 PROCEDURE — 84443 ASSAY THYROID STIM HORMONE: CPT

## 2024-02-28 PROCEDURE — 81001 URINALYSIS AUTO W/SCOPE: CPT

## 2024-02-28 PROCEDURE — 82570 ASSAY OF URINE CREATININE: CPT

## 2024-02-28 PROCEDURE — 80061 LIPID PANEL: CPT

## 2024-05-22 DIAGNOSIS — I10 ESSENTIAL HYPERTENSION: ICD-10-CM

## 2024-05-22 DIAGNOSIS — I25.119 CORONARY ARTERY DISEASE INVOLVING NATIVE CORONARY ARTERY OF NATIVE HEART WITH ANGINA PECTORIS (HCC): ICD-10-CM

## 2024-05-22 RX ORDER — ATENOLOL 25 MG/1
25 TABLET ORAL DAILY
Qty: 90 TABLET | Refills: 1 | Status: SHIPPED | OUTPATIENT
Start: 2024-05-22

## 2024-05-22 NOTE — TELEPHONE ENCOUNTER
Is the patient due for a refill? Yes    Was the patient seen the past year? Yes    Date of last office visit: 6/13/23    Does the patient have an upcoming appointment?  Yes   If yes, When? 9/9/24    Provider to refill:AK ADD    Does the patients insurance require a 100 day supply?  No

## 2024-05-22 NOTE — TELEPHONE ENCOUNTER
FV with BE in September 2024.    To AK (ADD): PP ELIOT, please confirm RX per protocol. Thank you!

## 2024-06-24 ENCOUNTER — HOSPITAL ENCOUNTER (OUTPATIENT)
Facility: MEDICAL CENTER | Age: 64
End: 2024-06-24
Attending: INTERNAL MEDICINE
Payer: COMMERCIAL

## 2024-06-24 DIAGNOSIS — I10 ESSENTIAL HYPERTENSION: ICD-10-CM

## 2024-06-24 PROCEDURE — 82668 ASSAY OF ERYTHROPOIETIN: CPT

## 2024-06-24 RX ORDER — LOSARTAN POTASSIUM 100 MG/1
100 TABLET ORAL
Qty: 100 TABLET | Refills: 0 | Status: SHIPPED | OUTPATIENT
Start: 2024-06-24

## 2024-06-24 NOTE — TELEPHONE ENCOUNTER
Is the patient due for a refill? Yes    Was the patient seen the past year? Yes    Date of last office visit: 6/13/23    Does the patient have an upcoming appointment?  Yes   If yes, When? 9/9/24    Provider to refill:JODY ADD    Does the patients insurance require a 100 day supply?  No

## 2024-06-26 LAB — EPO SERPL-ACNC: 6 MU/ML (ref 4–27)

## 2024-07-01 DIAGNOSIS — E78.5 DYSLIPIDEMIA: ICD-10-CM

## 2024-07-02 RX ORDER — EVOLOCUMAB 140 MG/ML
140 INJECTION, SOLUTION SUBCUTANEOUS
Qty: 6 EACH | Refills: 0 | Status: SHIPPED | OUTPATIENT
Start: 2024-07-02

## 2024-08-01 ENCOUNTER — APPOINTMENT (OUTPATIENT)
Dept: RADIOLOGY | Facility: MEDICAL CENTER | Age: 64
End: 2024-08-01
Attending: INTERNAL MEDICINE
Payer: COMMERCIAL

## 2024-08-05 ENCOUNTER — HOSPITAL ENCOUNTER (OUTPATIENT)
Dept: LAB | Facility: MEDICAL CENTER | Age: 64
End: 2024-08-05
Attending: UROLOGY
Payer: COMMERCIAL

## 2024-08-05 ENCOUNTER — APPOINTMENT (OUTPATIENT)
Dept: RADIOLOGY | Facility: MEDICAL CENTER | Age: 64
End: 2024-08-05
Attending: INTERNAL MEDICINE
Payer: COMMERCIAL

## 2024-08-05 DIAGNOSIS — D75.1 POLYCYTHEMIA, SECONDARY: ICD-10-CM

## 2024-08-05 DIAGNOSIS — Z87.448 PERSONAL HISTORY OF URINARY DISORDER: ICD-10-CM

## 2024-08-05 DIAGNOSIS — G47.33 OBSTRUCTIVE SLEEP APNEA (ADULT) (PEDIATRIC): ICD-10-CM

## 2024-08-05 DIAGNOSIS — I25.119 ATHEROSCLEROSIS OF NATIVE CORONARY ARTERY WITH ANGINA PECTORIS, UNSPECIFIED WHETHER NATIVE OR TRANSPLANTED HEART (HCC): ICD-10-CM

## 2024-08-05 DIAGNOSIS — N18.30 STAGE 3 CHRONIC KIDNEY DISEASE, UNSPECIFIED WHETHER STAGE 3A OR 3B CKD: ICD-10-CM

## 2024-08-05 DIAGNOSIS — E78.5 HYPERLIPIDEMIA, UNSPECIFIED HYPERLIPIDEMIA TYPE: ICD-10-CM

## 2024-08-05 DIAGNOSIS — I10 ESSENTIAL HYPERTENSION, MALIGNANT: ICD-10-CM

## 2024-08-05 DIAGNOSIS — E11.9 DIABETES MELLITUS WITHOUT COMPLICATION (HCC): ICD-10-CM

## 2024-08-05 LAB — PSA SERPL-MCNC: 0.52 NG/ML (ref 0–4)

## 2024-08-05 PROCEDURE — 84153 ASSAY OF PSA TOTAL: CPT

## 2024-08-05 PROCEDURE — 36415 COLL VENOUS BLD VENIPUNCTURE: CPT

## 2024-08-05 PROCEDURE — 76775 US EXAM ABDO BACK WALL LIM: CPT

## 2024-09-09 ENCOUNTER — OFFICE VISIT (OUTPATIENT)
Dept: CARDIOLOGY | Facility: MEDICAL CENTER | Age: 64
End: 2024-09-09
Attending: INTERNAL MEDICINE
Payer: COMMERCIAL

## 2024-09-09 VITALS
RESPIRATION RATE: 16 BRPM | WEIGHT: 181 LBS | HEART RATE: 92 BPM | SYSTOLIC BLOOD PRESSURE: 134 MMHG | OXYGEN SATURATION: 93 % | HEIGHT: 65 IN | BODY MASS INDEX: 30.16 KG/M2 | DIASTOLIC BLOOD PRESSURE: 72 MMHG

## 2024-09-09 DIAGNOSIS — D75.1 POLYCYTHEMIA: ICD-10-CM

## 2024-09-09 DIAGNOSIS — I10 ESSENTIAL HYPERTENSION: ICD-10-CM

## 2024-09-09 DIAGNOSIS — K75.81 STEATOHEPATITIS: ICD-10-CM

## 2024-09-09 DIAGNOSIS — I25.84 CORONARY ARTERY DISEASE DUE TO CALCIFIED CORONARY LESION: ICD-10-CM

## 2024-09-09 DIAGNOSIS — E11.9 TYPE 2 DIABETES MELLITUS WITHOUT COMPLICATION, WITHOUT LONG-TERM CURRENT USE OF INSULIN (HCC): ICD-10-CM

## 2024-09-09 DIAGNOSIS — E78.5 DYSLIPIDEMIA: ICD-10-CM

## 2024-09-09 DIAGNOSIS — D69.6 THROMBOCYTOPENIA (HCC): ICD-10-CM

## 2024-09-09 DIAGNOSIS — I25.10 CORONARY ARTERY DISEASE DUE TO CALCIFIED CORONARY LESION: ICD-10-CM

## 2024-09-09 DIAGNOSIS — Z95.1 S/P CABG (CORONARY ARTERY BYPASS GRAFT): Chronic | ICD-10-CM

## 2024-09-09 DIAGNOSIS — G47.33 OSA (OBSTRUCTIVE SLEEP APNEA): ICD-10-CM

## 2024-09-09 PROCEDURE — 3075F SYST BP GE 130 - 139MM HG: CPT | Performed by: INTERNAL MEDICINE

## 2024-09-09 PROCEDURE — 99213 OFFICE O/P EST LOW 20 MIN: CPT | Performed by: INTERNAL MEDICINE

## 2024-09-09 PROCEDURE — G2211 COMPLEX E/M VISIT ADD ON: HCPCS | Performed by: INTERNAL MEDICINE

## 2024-09-09 PROCEDURE — 3078F DIAST BP <80 MM HG: CPT | Performed by: INTERNAL MEDICINE

## 2024-09-09 PROCEDURE — 99214 OFFICE O/P EST MOD 30 MIN: CPT | Performed by: INTERNAL MEDICINE

## 2024-09-09 RX ORDER — ATORVASTATIN CALCIUM 10 MG/1
10 TABLET, FILM COATED ORAL NIGHTLY
Qty: 100 TABLET | Refills: 3 | Status: SHIPPED | OUTPATIENT
Start: 2024-09-09

## 2024-09-09 ASSESSMENT — FIBROSIS 4 INDEX: FIB4 SCORE: 1.82

## 2024-09-09 NOTE — PROGRESS NOTES
"CARDIOLOGY OUTPATIENT FOLLOWUP    PCP: Jacey Elizondo M.D.    1. Coronary artery disease due to a calcified coronary lesion: Status post CABG x3 in 1998.  Occluded SVG to OM with a patent LIMA to the LAD in 2007    2. S/P CABG (coronary artery bypass graft)    3. Essential hypertension    4. Dyslipidemia    5. Class 2 severe obesity due to excess calories with serious comorbidity and body mass index (BMI) of 35.0 to 35.9 in adult ([facility])    6. DAVID (obstructive sleep apnea)    7. Thrombocytopenia ([facility])    8. Steatohepatitis    9. Polycythemia    10. Type 2 diabetes mellitus without complication, without long-term current use of insulin ([facility])        Severo Coleman has LDL above the target of less than 70 along with history of statin intolerance.  He is agreeable to trying atorvastatin 10 mg nightly.  I will update a lipid panel in 1 month.    Home blood pressure is well-controlled.  No changes to the antihypertensive regimen were advised.    He is not having angina    LVEF is normal despite anterior Q waves on the ECG.    Follow up: 1 year    History: Severo Coleman is a 63 y.o. male with polycythemia, diabetes, DAVID on CPAP, 3v CABG in the 1990s and dyslipidemia presenting for follow-up.  LVEF is normal as of 2020, and EKG at that time showed anterior Q waves with persistent ST elevation.    He has no cardiovascular symptoms.    Saw hematology recently.  Reported everything was fine.      Physical Exam:  /72 (BP Location: Left arm, Patient Position: Sitting, BP Cuff Size: Adult)   Pulse 92   Resp 16   Ht 1.651 m (5' 5\")   Wt 82.1 kg (181 lb)   SpO2 93%   BMI 30.12 kg/m²   GEN: NAD  RESP: CTAB  CVS: RRR, No M/R/G  ABD: Soft, NT/ND  EXT: WWP, no edema    () Today's E/M visit is associated with medical care services that serve as the continuing focal point for all needed health care services and/or with medical care services that  are part of ongoing care related to a patient's single, " serious condition, or a complex condition: This includes  furnishing services to patients on an ongoing basis that result in care that is personalized  to the patient. The services result in a comprehensive, longitudinal, and continuous  relationship with the patient and involve delivery of team-based care that is accessible, coordinated with other practitioners and providers, and integrated with the broader health  care landscape.     The 10-year ASCVD risk score (Hussain VALDEZ, et al., 2019) is: 26%    Studies  Lab Results   Component Value Date/Time    CHOLSTRLTOT 172 02/28/2024 07:32 AM    LDL 92 02/28/2024 07:32 AM    HDL 34 (A) 02/28/2024 07:32 AM    TRIGLYCERIDE 228 (H) 02/28/2024 07:32 AM       Lab Results   Component Value Date/Time    SODIUM 138 02/28/2024 07:32 AM    POTASSIUM 4.7 02/28/2024 07:32 AM    CHLORIDE 102 02/28/2024 07:32 AM    CO2 23 02/28/2024 07:32 AM    GLUCOSE 108 (H) 02/28/2024 07:32 AM    BUN 27 (H) 02/28/2024 07:32 AM    CREATININE 1.63 (H) 02/28/2024 07:32 AM      Lab Results   Component Value Date/Time    PROTHROMBTM 13.5 10/13/2016 08:01 AM    INR 1.00 10/13/2016 08:01 AM      Lab Results   Component Value Date/Time    WBC 9.5 02/28/2024 07:32 AM    RBC 7.22 (H) 02/28/2024 07:32 AM    HEMOGLOBIN 20.0 (H) 02/28/2024 07:32 AM    HEMATOCRIT 62.4 (H) 02/28/2024 07:32 AM    MCV 86.4 02/28/2024 07:32 AM    MCH 27.7 02/28/2024 07:32 AM    MCHC 32.1 (L) 02/28/2024 07:32 AM    MPV 10.6 02/28/2024 07:32 AM    NEUTSPOLYS 70.50 02/28/2024 07:32 AM    LYMPHOCYTES 15.50 (L) 02/28/2024 07:32 AM    MONOCYTES 8.70 02/28/2024 07:32 AM    EOSINOPHILS 2.30 02/28/2024 07:32 AM    BASOPHILS 0.60 02/28/2024 07:32 AM        Past Medical History:   Diagnosis Date    CAD (coronary artery disease)     Congestive heart failure (HCC)     Heart valve disease     High cholesterol     HTN (hypertension) 6/30/2014    Hyperlipidemia 5/14/2012    Kidney stone     Liver disease     Myocardial infarct (HCC) 1998    Obesity  5/14/2012    Renal disorder     kidney stones    S/P CABG (coronary artery bypass graft) 1998    3 vessel     No Known Allergies  Outpatient Encounter Medications as of 9/9/2024   Medication Sig Dispense Refill    atorvastatin (LIPITOR) 10 MG Tab Take 1 Tablet by mouth every evening. 100 Tablet 3    Evolocumab (REPATHA SURECLICK) 140 MG/ML Solution Auto-injector SubQ injection pen Inject 1 mL under the skin every 14 days. Inject 1 pen (140mg) under the skin every 14 days. 6 Each 0    losartan (COZAAR) 100 MG Tab Take 1 Tablet by mouth every day. 100 Tablet 0    atenolol (TENORMIN) 25 MG Tab Take 1 Tablet by mouth every day. PLEASE KEEP YOUR FOLLOW UP APPOINTMENT FOR FURTHER REFILLS. THANK YOU! 90 Tablet 1    OZEMPIC, 0.25 OR 0.5 MG/DOSE, 2 MG/3ML Solution Pen-injector INJECT 0.5MG SUBCUTANEOUSLY ONCE PER WEEK      metFORMIN ER (GLUCOPHAGE XR) 500 MG TABLET SR 24 HR Take 2 Tablets by mouth 2 times a day. 360 Tablet 0    FARXIGA 10 MG Tab TAKE 1 TABLET BY MOUTH EVERY DAY. REPLACES 5 MG TABLET      Cholecalciferol (VITAMIN D) 125 MCG (5000 UT) Cap Take 5,000 Units by mouth every day.      folic acid (FOLVITE) 1 MG Tab Take 2 Tabs by mouth 2 Times a Day. 120 Tab 11    Pyridoxine HCl (VITAMIN B6 PO) Take  by mouth every day.      Coenzyme Q10 (CO Q-10 PO) Take  by mouth every day.      cyanocobalamin (VITAMIN B-12) 100 MCG TABS Take 100 mcg by mouth every day.        aspirin 81 MG tablet Take 81 mg by mouth every day.      finasteride (PROSCAR) 5 MG Tab Take 5 mg by mouth every day. (Patient not taking: Reported on 9/9/2024)       No facility-administered encounter medications on file as of 9/9/2024.     Social History     Socioeconomic History    Marital status: Single     Spouse name: Not on file    Number of children: Not on file    Years of education: Not on file    Highest education level: Not on file   Occupational History    Not on file   Tobacco Use    Smoking status: Never    Smokeless tobacco: Never   Vaping  Use    Vaping status: Never Used   Substance and Sexual Activity    Alcohol use: No    Drug use: No    Sexual activity: Not on file   Other Topics Concern    Not on file   Social History Narrative    Works at [employer].      Social Determinants of Health     Financial Resource Strain: Not on file   Food Insecurity: Not on file   Transportation Needs: Not on file   Physical Activity: Not on file   Stress: Not on file   Social Connections: Not on file   Intimate Partner Violence: Not on file   Housing Stability: Not on file         ROS:   10 point review systems is otherwise negative except as per the HPI    Chief Complaint   Patient presents with    Follow-Up     F/v Dx: S/P CABG (coronary artery bypass graft)    Hypertension     Essential hypertension    Coronary Artery Disease

## 2024-09-11 ENCOUNTER — HOSPITAL ENCOUNTER (OUTPATIENT)
Dept: LAB | Facility: MEDICAL CENTER | Age: 64
End: 2024-09-11
Attending: INTERNAL MEDICINE
Payer: COMMERCIAL

## 2024-09-11 LAB
25(OH)D3 SERPL-MCNC: 65 NG/ML (ref 30–100)
ALBUMIN SERPL BCP-MCNC: 4.4 G/DL (ref 3.2–4.9)
ALBUMIN/GLOB SERPL: 1.5 G/DL
ALP SERPL-CCNC: 108 U/L (ref 30–99)
ALT SERPL-CCNC: 36 U/L (ref 2–50)
ANION GAP SERPL CALC-SCNC: 12 MMOL/L (ref 7–16)
APPEARANCE UR: CLEAR
AST SERPL-CCNC: 34 U/L (ref 12–45)
BACTERIA #/AREA URNS HPF: NEGATIVE /HPF
BILIRUB SERPL-MCNC: 0.5 MG/DL (ref 0.1–1.5)
BILIRUB UR QL STRIP.AUTO: NEGATIVE
BUN SERPL-MCNC: 25 MG/DL (ref 8–22)
C3 SERPL-MCNC: 162.3 MG/DL (ref 87–200)
C4 SERPL-MCNC: 40.9 MG/DL (ref 19–52)
CALCIUM ALBUM COR SERPL-MCNC: 9.9 MG/DL (ref 8.5–10.5)
CALCIUM SERPL-MCNC: 10.2 MG/DL (ref 8.4–10.2)
CHLORIDE SERPL-SCNC: 104 MMOL/L (ref 96–112)
CO2 SERPL-SCNC: 23 MMOL/L (ref 20–33)
COLOR UR: YELLOW
CREAT SERPL-MCNC: 1.52 MG/DL (ref 0.5–1.4)
CREAT UR-MCNC: 86.51 MG/DL
CREAT UR-MCNC: 87.71 MG/DL
CRP SERPL HS-MCNC: <0.3 MG/DL (ref 0–0.75)
EPI CELLS #/AREA URNS HPF: ABNORMAL /HPF
ERYTHROCYTE [SEDIMENTATION RATE] IN BLOOD BY WESTERGREN METHOD: 3 MM/HOUR (ref 0–20)
FASTING STATUS PATIENT QL REPORTED: NORMAL
GFR SERPLBLD CREATININE-BSD FMLA CKD-EPI: 51 ML/MIN/1.73 M 2
GLOBULIN SER CALC-MCNC: 2.9 G/DL (ref 1.9–3.5)
GLUCOSE SERPL-MCNC: 110 MG/DL (ref 65–99)
GLUCOSE UR STRIP.AUTO-MCNC: >=1000 MG/DL
HAV IGM SERPL QL IA: NORMAL
HBV CORE IGM SER QL: NORMAL
HBV SURFACE AG SER QL: NORMAL
HCV AB SER QL: NORMAL
HYALINE CASTS #/AREA URNS LPF: ABNORMAL /LPF
KETONES UR STRIP.AUTO-MCNC: NEGATIVE MG/DL
LEUKOCYTE ESTERASE UR QL STRIP.AUTO: NEGATIVE
MAGNESIUM SERPL-MCNC: 1.9 MG/DL (ref 1.5–2.5)
MICRO URNS: ABNORMAL
MICROALBUMIN UR-MCNC: 116.5 MG/DL
MICROALBUMIN/CREAT UR: 1347 MG/G (ref 0–30)
MUCOUS THREADS #/AREA URNS HPF: ABNORMAL /HPF
NITRITE UR QL STRIP.AUTO: NEGATIVE
PH UR STRIP.AUTO: 6 [PH] (ref 5–8)
POTASSIUM SERPL-SCNC: 5.3 MMOL/L (ref 3.6–5.5)
PROT SERPL-MCNC: 7.3 G/DL (ref 6–8.2)
PROT UR QL STRIP: 100 MG/DL
PROT UR-MCNC: 180 MG/DL (ref 0–15)
PROT/CREAT UR: 2052 MG/G (ref 15–68)
RBC # URNS HPF: ABNORMAL /HPF
RBC UR QL AUTO: ABNORMAL
SODIUM SERPL-SCNC: 139 MMOL/L (ref 135–145)
SP GR UR STRIP.AUTO: 1.02
URATE SERPL-MCNC: 8.2 MG/DL (ref 2.5–8.3)
WBC #/AREA URNS HPF: ABNORMAL /HPF

## 2024-09-11 PROCEDURE — 86160 COMPLEMENT ANTIGEN: CPT

## 2024-09-11 PROCEDURE — 36415 COLL VENOUS BLD VENIPUNCTURE: CPT

## 2024-09-11 PROCEDURE — 83516 IMMUNOASSAY NONANTIBODY: CPT | Mod: 91

## 2024-09-11 PROCEDURE — 85652 RBC SED RATE AUTOMATED: CPT

## 2024-09-11 PROCEDURE — 84165 PROTEIN E-PHORESIS SERUM: CPT

## 2024-09-11 PROCEDURE — 84156 ASSAY OF PROTEIN URINE: CPT

## 2024-09-11 PROCEDURE — 82570 ASSAY OF URINE CREATININE: CPT

## 2024-09-11 PROCEDURE — 84155 ASSAY OF PROTEIN SERUM: CPT

## 2024-09-11 PROCEDURE — 86162 COMPLEMENT TOTAL (CH50): CPT

## 2024-09-11 PROCEDURE — 86225 DNA ANTIBODY NATIVE: CPT

## 2024-09-11 PROCEDURE — 86140 C-REACTIVE PROTEIN: CPT

## 2024-09-11 PROCEDURE — 80053 COMPREHEN METABOLIC PANEL: CPT

## 2024-09-11 PROCEDURE — 86038 ANTINUCLEAR ANTIBODIES: CPT

## 2024-09-11 PROCEDURE — 83735 ASSAY OF MAGNESIUM: CPT

## 2024-09-11 PROCEDURE — 80074 ACUTE HEPATITIS PANEL: CPT

## 2024-09-11 PROCEDURE — 82306 VITAMIN D 25 HYDROXY: CPT

## 2024-09-11 PROCEDURE — 86334 IMMUNOFIX E-PHORESIS SERUM: CPT

## 2024-09-11 PROCEDURE — 82043 UR ALBUMIN QUANTITATIVE: CPT

## 2024-09-11 PROCEDURE — 83521 IG LIGHT CHAINS FREE EACH: CPT

## 2024-09-11 PROCEDURE — 81001 URINALYSIS AUTO W/SCOPE: CPT

## 2024-09-11 PROCEDURE — 84550 ASSAY OF BLOOD/URIC ACID: CPT

## 2024-09-11 PROCEDURE — 83036 HEMOGLOBIN GLYCOSYLATED A1C: CPT

## 2024-09-12 LAB
DSDNA AB TITR SER CLIF: NORMAL {TITER}
EST. AVERAGE GLUCOSE BLD GHB EST-MCNC: 140 MG/DL
HBA1C MFR BLD: 6.5 % (ref 4–5.6)
KAPPA LC FREE SER-MCNC: 26.01 MG/L (ref 3.3–19.4)
KAPPA LC FREE/LAMBDA FREE SER NEPH: 1.6 {RATIO} (ref 0.26–1.65)
LAMBDA LC FREE SERPL-MCNC: 16.22 MG/L (ref 5.71–26.3)
NUCLEAR IGG SER QL IA: NORMAL

## 2024-09-13 LAB
CH50 SERPL-ACNC: >95 U/ML (ref 38.7–89.9)
MYELOPEROXIDASE AB SER-ACNC: 0 AU/ML (ref 0–19)
PROTEINASE3 AB SER-ACNC: 0 AU/ML (ref 0–19)

## 2024-09-14 LAB
ALBUMIN SERPL ELPH-MCNC: 4.56 G/DL (ref 3.75–5.01)
ALPHA1 GLOB SERPL ELPH-MCNC: 0.25 G/DL (ref 0.19–0.46)
ALPHA2 GLOB SERPL ELPH-MCNC: 0.94 G/DL (ref 0.48–1.05)
B-GLOBULIN SERPL ELPH-MCNC: 0.85 G/DL (ref 0.48–1.1)
GAMMA GLOB SERPL ELPH-MCNC: 0.7 G/DL (ref 0.62–1.51)
INTERPRETATION SERPL IFE-IMP: NORMAL
INTERPRETATION SERPL IFE-IMP: NORMAL
MONOCLONAL PROTEIN NL11656: NORMAL G/DL
PATHOLOGY STUDY: NORMAL
PROT SERPL-MCNC: 7.3 G/DL (ref 6.3–8.2)

## 2024-10-09 DIAGNOSIS — I10 ESSENTIAL HYPERTENSION: ICD-10-CM

## 2024-10-09 RX ORDER — LOSARTAN POTASSIUM 100 MG/1
100 TABLET ORAL
Qty: 100 TABLET | Refills: 3 | Status: SHIPPED | OUTPATIENT
Start: 2024-10-09

## 2024-10-18 ENCOUNTER — HOSPITAL ENCOUNTER (OUTPATIENT)
Dept: LAB | Facility: MEDICAL CENTER | Age: 64
End: 2024-10-18
Attending: NURSE PRACTITIONER
Payer: COMMERCIAL

## 2024-10-28 DIAGNOSIS — E78.5 DYSLIPIDEMIA: ICD-10-CM

## 2024-10-28 RX ORDER — EVOLOCUMAB 140 MG/ML
140 INJECTION, SOLUTION SUBCUTANEOUS
Qty: 6 EACH | Refills: 2 | Status: SHIPPED | OUTPATIENT
Start: 2024-10-28

## 2024-10-30 ENCOUNTER — TELEPHONE (OUTPATIENT)
Dept: CARDIOLOGY | Facility: MEDICAL CENTER | Age: 64
End: 2024-10-30
Payer: COMMERCIAL

## 2024-11-01 ENCOUNTER — HOSPITAL ENCOUNTER (OUTPATIENT)
Dept: LAB | Facility: MEDICAL CENTER | Age: 64
End: 2024-11-01
Attending: NURSE PRACTITIONER
Payer: COMMERCIAL

## 2024-11-01 DIAGNOSIS — I10 ESSENTIAL HYPERTENSION: ICD-10-CM

## 2024-11-01 LAB
ANION GAP SERPL CALC-SCNC: 10 MMOL/L (ref 7–16)
BUN SERPL-MCNC: 24 MG/DL (ref 8–22)
CALCIUM SERPL-MCNC: 9.8 MG/DL (ref 8.4–10.2)
CHLORIDE SERPL-SCNC: 103 MMOL/L (ref 96–112)
CO2 SERPL-SCNC: 24 MMOL/L (ref 20–33)
CREAT SERPL-MCNC: 1.56 MG/DL (ref 0.5–1.4)
FASTING STATUS PATIENT QL REPORTED: NORMAL
GFR SERPLBLD CREATININE-BSD FMLA CKD-EPI: 50 ML/MIN/1.73 M 2
GLUCOSE SERPL-MCNC: 103 MG/DL (ref 65–99)
POTASSIUM SERPL-SCNC: 4.7 MMOL/L (ref 3.6–5.5)
SODIUM SERPL-SCNC: 137 MMOL/L (ref 135–145)

## 2024-11-01 PROCEDURE — 80048 BASIC METABOLIC PNL TOTAL CA: CPT

## 2024-11-01 PROCEDURE — 36415 COLL VENOUS BLD VENIPUNCTURE: CPT

## 2024-11-04 ENCOUNTER — TELEPHONE (OUTPATIENT)
Dept: CARDIOLOGY | Facility: MEDICAL CENTER | Age: 64
End: 2024-11-04
Payer: COMMERCIAL

## 2024-11-04 NOTE — TELEPHONE ENCOUNTER
Papito Johnson M.D.  11/4/2024  1:25 PM PST Back to Top      The labs look good. Continue plan of care       Phone Number Called: [phone]    Call outcome: Did not leave a detailed message. Requested patient to call back.

## 2024-11-05 NOTE — TELEPHONE ENCOUNTER
Phone Number Called: [phone]    Call outcome:  Unable to leave voicemail. Will try again at a later time.

## 2024-11-08 DIAGNOSIS — E78.5 DYSLIPIDEMIA: ICD-10-CM

## 2024-11-08 RX ORDER — EVOLOCUMAB 140 MG/ML
140 INJECTION, SOLUTION SUBCUTANEOUS
Qty: 6 EACH | Refills: 2 | Status: SHIPPED | OUTPATIENT
Start: 2024-11-08

## 2024-11-08 NOTE — TELEPHONE ENCOUNTER
BE    Received request via: Patient    Was the patient seen in the last year in this department? Yes    Does the patient have an active prescription (recently filled or refills available) for medication(s) requested?  Yes    Pharmacy Name:   [facility] - [address]  [phone]    Does the patient have [facility] Plus and need 100-day supply? (This applies to ALL medications) Patient does not have SCP

## 2024-11-08 NOTE — TELEPHONE ENCOUNTER
Phone Number Called: [phone]    Call outcome: Spoke to patient regarding message below.    Message: Called to clarify pt is needing repatha rx to be sent to [facility]. Pt confirmed. Called number provided below to confirm correct address of pharmacy.     To Kemi salas FYI. ~thank you

## 2024-11-12 DIAGNOSIS — I10 ESSENTIAL HYPERTENSION: ICD-10-CM

## 2024-11-12 DIAGNOSIS — I25.119 CORONARY ARTERY DISEASE INVOLVING NATIVE CORONARY ARTERY OF NATIVE HEART WITH ANGINA PECTORIS (HCC): ICD-10-CM

## 2024-11-12 RX ORDER — ATENOLOL 25 MG/1
TABLET ORAL
Qty: 90 TABLET | Refills: 2 | Status: SHIPPED | OUTPATIENT
Start: 2024-11-12

## 2024-12-13 ENCOUNTER — TELEPHONE (OUTPATIENT)
Dept: CARDIOLOGY | Facility: MEDICAL CENTER | Age: 64
End: 2024-12-13
Payer: COMMERCIAL

## 2024-12-13 DIAGNOSIS — E78.5 DYSLIPIDEMIA: ICD-10-CM

## 2024-12-13 NOTE — TELEPHONE ENCOUNTER
Phone Number Called: [phone]    Call outcome: Spoke to patient regarding message below.    Message: Called to inform patient that a lipid panel is needed to complete the PA for repatha. Pt verbalized understanding and will get the labs done .     Lipid panel ordered from 9/9/24.

## 2024-12-13 NOTE — TELEPHONE ENCOUNTER
[facility]    The Repatha needs its PA renewal but it states the pt needs to have labs done in the last 120 day. Pt would need to get labs done in order of me to complete PA. Could you please send a order for labs and let pt know. Thank you.

## 2024-12-16 ENCOUNTER — HOSPITAL ENCOUNTER (OUTPATIENT)
Dept: LAB | Facility: MEDICAL CENTER | Age: 64
End: 2024-12-16
Attending: INTERNAL MEDICINE
Payer: COMMERCIAL

## 2024-12-16 DIAGNOSIS — Z95.1 S/P CABG (CORONARY ARTERY BYPASS GRAFT): Chronic | ICD-10-CM

## 2024-12-16 LAB
CHOLEST SERPL-MCNC: 105 MG/DL (ref 100–199)
FASTING STATUS PATIENT QL REPORTED: NORMAL
HDLC SERPL-MCNC: 39 MG/DL
LDLC SERPL CALC-MCNC: 35 MG/DL
TRIGL SERPL-MCNC: 156 MG/DL (ref 0–149)

## 2024-12-16 PROCEDURE — 36415 COLL VENOUS BLD VENIPUNCTURE: CPT

## 2024-12-16 PROCEDURE — 80061 LIPID PANEL: CPT

## 2024-12-26 ENCOUNTER — TELEPHONE (OUTPATIENT)
Dept: CARDIOLOGY | Facility: MEDICAL CENTER | Age: 64
End: 2024-12-26
Payer: COMMERCIAL

## 2024-12-26 NOTE — TELEPHONE ENCOUNTER
BE    Caller: Severo Coleman     Topic/issue: Pt called in regards to needing a PA for the medication   Evolocumab (REPATHA SURECLICK) 140 MG/ML  pt's insurances called him that he needs to get the pa before he can get it refilled please advise.     Callback Number: [phone] (home)       Thank you,    Sabas HERRMANN

## 2024-12-31 NOTE — TELEPHONE ENCOUNTER
BE  Caller: Severo Coleman     Topic/issue: Patient is following up on his prior authorization for Evolocumab (REPATHA SURECLICK) 140 MG/ML Solution Auto-injector SubQ injection pen .    Patient did reach out to his insurance company and they have not received an authorization request.     Patient has been out of this medication.    Callback Number: [phone]      Thank you  Jessica EAST

## 2025-01-06 NOTE — TELEPHONE ENCOUNTER
BE    Caller: Severo Coleman    Topic/issue: MEDICATION MANAGEMENT     Severo states that he is completely out of his REPATHA. He states that he has been trying for a couple of weeks now to get this med and no one has responded to him. Please advise.    Thank you,  Paddy BAILEY    Callback Number: [phone] (home)

## 2025-01-07 ENCOUNTER — TELEPHONE (OUTPATIENT)
Dept: CARDIOLOGY | Facility: MEDICAL CENTER | Age: 65
End: 2025-01-07
Payer: COMMERCIAL

## 2025-01-07 NOTE — TELEPHONE ENCOUNTER
Prior Authorization for Repatha 140mg/ml  has been approved for a quantity of 2 , day supply 28    Prior Authorization reference number: [id number]  Insurance : [employer]  Effective dates: 1/6/25 to 1/6/26  Copay: $NA     Is patient eligible to fill with [facility]? Yes    Next Steps:  [facility]

## 2025-01-13 ENCOUNTER — HOSPITAL ENCOUNTER (OUTPATIENT)
Dept: LAB | Facility: MEDICAL CENTER | Age: 65
End: 2025-01-13
Attending: FAMILY MEDICINE
Payer: COMMERCIAL

## 2025-01-13 LAB
ALBUMIN SERPL BCP-MCNC: 4.1 G/DL (ref 3.2–4.9)
ALBUMIN/GLOB SERPL: 1.5 G/DL
ALP SERPL-CCNC: 99 U/L (ref 30–99)
ALT SERPL-CCNC: 42 U/L (ref 2–50)
ANION GAP SERPL CALC-SCNC: 9 MMOL/L (ref 7–16)
AST SERPL-CCNC: 30 U/L (ref 12–45)
BASOPHILS # BLD AUTO: 0.7 % (ref 0–1.8)
BASOPHILS # BLD: 0.07 K/UL (ref 0–0.12)
BILIRUB SERPL-MCNC: 0.4 MG/DL (ref 0.1–1.5)
BUN SERPL-MCNC: 24 MG/DL (ref 8–22)
CALCIUM ALBUM COR SERPL-MCNC: 9.7 MG/DL (ref 8.5–10.5)
CALCIUM SERPL-MCNC: 9.8 MG/DL (ref 8.4–10.2)
CHLORIDE SERPL-SCNC: 104 MMOL/L (ref 96–112)
CHOLEST SERPL-MCNC: 95 MG/DL (ref 100–199)
CO2 SERPL-SCNC: 25 MMOL/L (ref 20–33)
CREAT SERPL-MCNC: 1.46 MG/DL (ref 0.5–1.4)
EOSINOPHIL # BLD AUTO: 0.26 K/UL (ref 0–0.51)
EOSINOPHIL NFR BLD: 2.6 % (ref 0–6.9)
ERYTHROCYTE [DISTWIDTH] IN BLOOD BY AUTOMATED COUNT: 47.2 FL (ref 35.9–50)
FASTING STATUS PATIENT QL REPORTED: NORMAL
GFR SERPLBLD CREATININE-BSD FMLA CKD-EPI: 54 ML/MIN/1.73 M 2
GLOBULIN SER CALC-MCNC: 2.8 G/DL (ref 1.9–3.5)
GLUCOSE SERPL-MCNC: 111 MG/DL (ref 65–99)
HCT VFR BLD AUTO: 60.5 % (ref 42–52)
HDLC SERPL-MCNC: 41 MG/DL
HGB BLD-MCNC: 19.3 G/DL (ref 14–18)
IMM GRANULOCYTES # BLD AUTO: 0.16 K/UL (ref 0–0.11)
IMM GRANULOCYTES NFR BLD AUTO: 1.6 % (ref 0–0.9)
LDLC SERPL CALC-MCNC: 21 MG/DL
LYMPHOCYTES # BLD AUTO: 1.64 K/UL (ref 1–4.8)
LYMPHOCYTES NFR BLD: 16.6 % (ref 22–41)
MCH RBC QN AUTO: 27.8 PG (ref 27–33)
MCHC RBC AUTO-ENTMCNC: 31.9 G/DL (ref 32.3–36.5)
MCV RBC AUTO: 87.1 FL (ref 81.4–97.8)
MONOCYTES # BLD AUTO: 0.92 K/UL (ref 0–0.85)
MONOCYTES NFR BLD AUTO: 9.3 % (ref 0–13.4)
NEUTROPHILS # BLD AUTO: 6.83 K/UL (ref 1.82–7.42)
NEUTROPHILS NFR BLD: 69.2 % (ref 44–72)
NRBC # BLD AUTO: 0 K/UL
NRBC BLD-RTO: 0 /100 WBC (ref 0–0.2)
PLATELET # BLD AUTO: 175 K/UL (ref 164–446)
PMV BLD AUTO: 9.9 FL (ref 9–12.9)
POTASSIUM SERPL-SCNC: 4.5 MMOL/L (ref 3.6–5.5)
PROT SERPL-MCNC: 6.9 G/DL (ref 6–8.2)
RBC # BLD AUTO: 6.95 M/UL (ref 4.7–6.1)
SODIUM SERPL-SCNC: 138 MMOL/L (ref 135–145)
TRIGL SERPL-MCNC: 165 MG/DL (ref 0–149)
WBC # BLD AUTO: 9.9 K/UL (ref 4.8–10.8)

## 2025-01-13 PROCEDURE — 85025 COMPLETE CBC W/AUTO DIFF WBC: CPT

## 2025-01-13 PROCEDURE — 82570 ASSAY OF URINE CREATININE: CPT

## 2025-01-13 PROCEDURE — 80061 LIPID PANEL: CPT

## 2025-01-13 PROCEDURE — 36415 COLL VENOUS BLD VENIPUNCTURE: CPT

## 2025-01-13 PROCEDURE — 82043 UR ALBUMIN QUANTITATIVE: CPT

## 2025-01-13 PROCEDURE — 83036 HEMOGLOBIN GLYCOSYLATED A1C: CPT

## 2025-01-13 PROCEDURE — 80053 COMPREHEN METABOLIC PANEL: CPT

## 2025-01-14 LAB
CREAT UR-MCNC: 97.53 MG/DL
EST. AVERAGE GLUCOSE BLD GHB EST-MCNC: 140 MG/DL
HBA1C MFR BLD: 6.5 % (ref 4–5.6)
MICROALBUMIN UR-MCNC: 111.6 MG/DL
MICROALBUMIN/CREAT UR: 1144 MG/G (ref 0–30)

## 2025-03-27 ENCOUNTER — HOSPITAL ENCOUNTER (OUTPATIENT)
Facility: MEDICAL CENTER | Age: 65
End: 2025-03-27
Attending: INTERNAL MEDICINE
Payer: COMMERCIAL

## 2025-03-27 LAB
25(OH)D3 SERPL-MCNC: 59 NG/ML (ref 30–100)
ALBUMIN SERPL BCP-MCNC: 4.2 G/DL (ref 3.2–4.9)
APPEARANCE UR: CLEAR
BACTERIA #/AREA URNS HPF: ABNORMAL /HPF
BILIRUB UR QL STRIP.AUTO: NEGATIVE
BUN SERPL-MCNC: 25 MG/DL (ref 8–22)
CALCIUM ALBUM COR SERPL-MCNC: 9.5 MG/DL (ref 8.5–10.5)
CALCIUM SERPL-MCNC: 9.7 MG/DL (ref 8.5–10.5)
CASTS URNS QL MICRO: ABNORMAL /LPF (ref 0–2)
CHLORIDE SERPL-SCNC: 104 MMOL/L (ref 96–112)
CO2 SERPL-SCNC: 21 MMOL/L (ref 20–33)
COLOR UR: YELLOW
CREAT SERPL-MCNC: 1.56 MG/DL (ref 0.5–1.4)
CREAT UR-MCNC: 73.5 MG/DL
CREAT UR-MCNC: 79.3 MG/DL
EPITHELIAL CELLS 1715: ABNORMAL /HPF (ref 0–5)
EST. AVERAGE GLUCOSE BLD GHB EST-MCNC: 143 MG/DL
FASTING STATUS PATIENT QL REPORTED: NORMAL
GFR SERPLBLD CREATININE-BSD FMLA CKD-EPI: 50 ML/MIN/1.73 M 2
GLUCOSE SERPL-MCNC: 93 MG/DL (ref 65–99)
GLUCOSE UR STRIP.AUTO-MCNC: >=1000 MG/DL
GRANULAR CASTS  1716G: PRESENT /LPF
HBA1C MFR BLD: 6.6 % (ref 4–5.6)
HYALINE CAST   1831: PRESENT /LPF
KETONES UR STRIP.AUTO-MCNC: NEGATIVE MG/DL
LEUKOCYTE ESTERASE UR QL STRIP.AUTO: NEGATIVE
MICRO URNS: ABNORMAL
MICROALBUMIN UR-MCNC: 73.4 MG/DL
MICROALBUMIN/CREAT UR: 999 MG/G (ref 0–30)
NITRITE UR QL STRIP.AUTO: NEGATIVE
PH UR STRIP.AUTO: 5.5 [PH] (ref 5–8)
PHOSPHATE SERPL-MCNC: 3 MG/DL (ref 2.5–4.5)
POTASSIUM SERPL-SCNC: 4.6 MMOL/L (ref 3.6–5.5)
PROT UR QL STRIP: 100 MG/DL
PROT UR-MCNC: 113 MG/DL (ref 0–15)
PROT/CREAT UR: 1425 MG/G (ref 15–68)
RBC # URNS HPF: ABNORMAL /HPF
RBC UR QL AUTO: NEGATIVE
SODIUM SERPL-SCNC: 138 MMOL/L (ref 135–145)
SP GR UR STRIP.AUTO: 1.02
UROBILINOGEN UR STRIP.AUTO-MCNC: 1 EU/DL
WBC #/AREA URNS HPF: ABNORMAL /HPF

## 2025-03-27 PROCEDURE — 82043 UR ALBUMIN QUANTITATIVE: CPT

## 2025-03-27 PROCEDURE — 82306 VITAMIN D 25 HYDROXY: CPT

## 2025-03-27 PROCEDURE — 84156 ASSAY OF PROTEIN URINE: CPT

## 2025-03-27 PROCEDURE — 80069 RENAL FUNCTION PANEL: CPT

## 2025-03-27 PROCEDURE — 82570 ASSAY OF URINE CREATININE: CPT

## 2025-03-27 PROCEDURE — 81001 URINALYSIS AUTO W/SCOPE: CPT

## 2025-03-27 PROCEDURE — 36415 COLL VENOUS BLD VENIPUNCTURE: CPT

## 2025-03-27 PROCEDURE — 83036 HEMOGLOBIN GLYCOSYLATED A1C: CPT

## 2025-05-27 ENCOUNTER — TELEPHONE (OUTPATIENT)
Dept: CARDIOLOGY | Facility: MEDICAL CENTER | Age: 65
End: 2025-05-27
Payer: COMMERCIAL

## 2025-05-27 VITALS — HEART RATE: 61 BPM | SYSTOLIC BLOOD PRESSURE: 125 MMHG | DIASTOLIC BLOOD PRESSURE: 85 MMHG

## 2025-05-27 NOTE — TELEPHONE ENCOUNTER
Phone Number Called: [phone]     Call outcome: Spoke to patient regarding message below.    Message: Called to inform patient that his BP and blood work will be discussed at his follow-up appointment on 5/30

## 2025-05-27 NOTE — TELEPHONE ENCOUNTER
BE    Caller: Severo Coleman    Topic/issue: MEDICAL ADVICE    Severo is calling in to reporting his BP readings:    125/85 HR 61    Also Severo has an upcoming appointment for 5/30 with BE and he would like to know if he should get some labs done prior to this visit. Please advise.    Thank you,  Paddy BAILEY    Callback Number: [phone] (home)

## 2025-05-30 ENCOUNTER — OFFICE VISIT (OUTPATIENT)
Dept: CARDIOLOGY | Facility: MEDICAL CENTER | Age: 65
End: 2025-05-30
Attending: INTERNAL MEDICINE
Payer: COMMERCIAL

## 2025-05-30 VITALS
OXYGEN SATURATION: 93 % | BODY MASS INDEX: 29.82 KG/M2 | HEART RATE: 75 BPM | HEIGHT: 65 IN | DIASTOLIC BLOOD PRESSURE: 76 MMHG | SYSTOLIC BLOOD PRESSURE: 118 MMHG | RESPIRATION RATE: 14 BRPM | WEIGHT: 179 LBS

## 2025-05-30 DIAGNOSIS — D69.6 THROMBOCYTOPENIA (HCC): ICD-10-CM

## 2025-05-30 DIAGNOSIS — I25.84 CORONARY ARTERY DISEASE DUE TO CALCIFIED CORONARY LESION: Primary | ICD-10-CM

## 2025-05-30 DIAGNOSIS — I10 ESSENTIAL HYPERTENSION: ICD-10-CM

## 2025-05-30 DIAGNOSIS — D75.1 POLYCYTHEMIA: ICD-10-CM

## 2025-05-30 DIAGNOSIS — I25.10 CORONARY ARTERY DISEASE DUE TO CALCIFIED CORONARY LESION: Primary | ICD-10-CM

## 2025-05-30 DIAGNOSIS — E11.9 TYPE 2 DIABETES MELLITUS WITHOUT COMPLICATION, WITHOUT LONG-TERM CURRENT USE OF INSULIN (HCC): ICD-10-CM

## 2025-05-30 DIAGNOSIS — Z95.1 S/P CABG (CORONARY ARTERY BYPASS GRAFT): ICD-10-CM

## 2025-05-30 DIAGNOSIS — E78.5 DYSLIPIDEMIA: ICD-10-CM

## 2025-05-30 DIAGNOSIS — N18.31 STAGE 3A CHRONIC KIDNEY DISEASE: ICD-10-CM

## 2025-05-30 PROCEDURE — 99213 OFFICE O/P EST LOW 20 MIN: CPT | Performed by: INTERNAL MEDICINE

## 2025-05-30 ASSESSMENT — FIBROSIS 4 INDEX: FIB4 SCORE: 1.67

## 2025-05-30 NOTE — PROGRESS NOTES
"CARDIOLOGY OUTPATIENT FOLLOWUP    PCP: Jacey Elizondo M.D.    1. Coronary artery disease due to a calcified coronary lesion: Status post CABG x3 in 1998.  Occluded SVG to OM with a patent LIMA to the LAD in 2007    2. S/P CABG (coronary artery bypass graft)    3. Essential hypertension    4. Dyslipidemia    5. Type 2 diabetes mellitus without complication, without long-term current use of insulin (HCC)    6. Thrombocytopenia (HCC)    7. Polycythemia    8. Stage 3a chronic kidney disease        Severo Coleman has LDL below 25 and accordingly I recommended stopping atorvastatin, continuing Repatha and we will repeat a lipid panel in 1 month.    He is stable from a cardiovascular stance.  He will continue with aspirin and atenolol as well as losartan which are nicely controlling the blood pressure.  He may stand to benefit from aldosterone inhibition.  I asked that he discuss finerenone or other MRAs with his primary care provider.  If needed, atenolol could be discontinued to allow blood pressure room.    Follow up: 1 year    History: Severo Coleman is a 63 y.o. male with polycythemia, diabetes, DAVID on CPAP, 3v CABG in the 1990s and dyslipidemia presenting for follow-up.  LVEF is normal as of 2020, and EKG at that time showed anterior Q waves with persistent ST elevation.    He feels well.  Continues to walk a lot at work.  No cardiovascular symptoms      Physical Exam:  /76 (BP Location: Left arm, Patient Position: Sitting, BP Cuff Size: Adult)   Pulse 75   Resp 14   Ht 1.651 m (5' 5\")   Wt 81.2 kg (179 lb)   SpO2 93%   BMI 29.79 kg/m²   GEN: NAD  RESP: CTAB  CVS: RRR, No M/R/G  ABD: Soft, NT/ND  EXT: WWP, no edema    () Today's E/M visit is associated with medical care services that serve as the continuing focal point for all needed health care services and/or with medical care services that  are part of ongoing care related to a patient's single, serious condition, or a complex " condition: This includes  furnishing services to patients on an ongoing basis that result in care that is personalized  to the patient. The services result in a comprehensive, longitudinal, and continuous  relationship with the patient and involve delivery of team-based care that is accessible, coordinated with other practitioners and providers, and integrated with the broader health  care landscape.     The ASCVD Risk score (Hussain VALDEZ, et al., 2019) failed to calculate.    Studies  Lab Results   Component Value Date/Time    CHOLSTRLTOT 95 (L) 01/13/2025 07:01 AM    LDL 21 01/13/2025 07:01 AM    HDL 41 01/13/2025 07:01 AM    TRIGLYCERIDE 165 (H) 01/13/2025 07:01 AM       Lab Results   Component Value Date/Time    SODIUM 138 03/27/2025 07:39 AM    POTASSIUM 4.6 03/27/2025 07:39 AM    CHLORIDE 104 03/27/2025 07:39 AM    CO2 21 03/27/2025 07:39 AM    GLUCOSE 93 03/27/2025 07:39 AM    BUN 25 (H) 03/27/2025 07:39 AM    CREATININE 1.56 (H) 03/27/2025 07:39 AM      Lab Results   Component Value Date/Time    PROTHROMBTM 13.5 10/13/2016 08:01 AM    INR 1.00 10/13/2016 08:01 AM      Lab Results   Component Value Date/Time    WBC 9.9 01/13/2025 07:01 AM    RBC 6.95 (H) 01/13/2025 07:01 AM    HEMOGLOBIN 19.3 (H) 01/13/2025 07:01 AM    HEMATOCRIT 60.5 (H) 01/13/2025 07:01 AM    MCV 87.1 01/13/2025 07:01 AM    MCH 27.8 01/13/2025 07:01 AM    MCHC 31.9 (L) 01/13/2025 07:01 AM    MPV 9.9 01/13/2025 07:01 AM    NEUTSPOLYS 69.20 01/13/2025 07:01 AM    LYMPHOCYTES 16.60 (L) 01/13/2025 07:01 AM    MONOCYTES 9.30 01/13/2025 07:01 AM    EOSINOPHILS 2.60 01/13/2025 07:01 AM    BASOPHILS 0.70 01/13/2025 07:01 AM        Past Medical History:   Diagnosis Date    CAD (coronary artery disease)     Congestive heart failure (HCC)     Heart valve disease     High cholesterol     HTN (hypertension) 6/30/2014    Hyperlipidemia 5/14/2012    Kidney stone     Liver disease     Myocardial infarct (HCC) 1998    Obesity 5/14/2012    Renal disorder      kidney stones    S/P CABG (coronary artery bypass graft) 1998    3 vessel     No Known Allergies  Outpatient Encounter Medications as of 5/30/2025   Medication Sig Dispense Refill    atenolol (TENORMIN) 25 MG Tab TAKE 1 TABLET BY MOUTH EVERY DAY. KEEP FOLLOW UP APPT FOR FURTHER REFILLS 90 Tablet 2    Evolocumab (REPATHA SURECLICK) 140 MG/ML Solution Auto-injector SubQ injection pen Inject 1 mL under the skin every 14 days. Inject 1 pen (140mg) under the skin every 14 days. 6 Each 2    losartan (COZAAR) 100 MG Tab TAKE 1 TABLET BY MOUTH EVERY  Tablet 3    OZEMPIC, 0.25 OR 0.5 MG/DOSE, 2 MG/3ML Solution Pen-injector INJECT 0.5MG SUBCUTANEOUSLY ONCE PER WEEK      metFORMIN ER (GLUCOPHAGE XR) 500 MG TABLET SR 24 HR Take 2 Tablets by mouth 2 times a day. 360 Tablet 0    FARXIGA 10 MG Tab TAKE 1 TABLET BY MOUTH EVERY DAY. REPLACES 5 MG TABLET      Cholecalciferol (VITAMIN D) 125 MCG (5000 UT) Cap Take 5,000 Units by mouth every day.      folic acid (FOLVITE) 1 MG Tab Take 2 Tabs by mouth 2 Times a Day. 120 Tab 11    Pyridoxine HCl (VITAMIN B6 PO) Take  by mouth every day.      Coenzyme Q10 (CO Q-10 PO) Take  by mouth every day.      cyanocobalamin (VITAMIN B-12) 100 MCG TABS Take 100 mcg by mouth every day.        aspirin 81 MG tablet Take 81 mg by mouth every day.      [DISCONTINUED] atorvastatin (LIPITOR) 10 MG Tab Take 1 Tablet by mouth every evening. 100 Tablet 3    [DISCONTINUED] finasteride (PROSCAR) 5 MG Tab Take 5 mg by mouth every day. (Patient not taking: Reported on 5/30/2025)       No facility-administered encounter medications on file as of 5/30/2025.     Social History     Socioeconomic History    Marital status: Single     Spouse name: Not on file    Number of children: Not on file    Years of education: Not on file    Highest education level: Not on file   Occupational History    Not on file   Tobacco Use    Smoking status: Never    Smokeless tobacco: Never   Vaping Use    Vaping status: Never Used    Substance and Sexual Activity    Alcohol use: No    Drug use: No    Sexual activity: Not on file   Other Topics Concern    Not on file   Social History Narrative    Works at [employer].      Social Drivers of Health     Financial Resource Strain: Not on file   Food Insecurity: Not on file   Transportation Needs: Not on file   Physical Activity: Not on file   Stress: Not on file   Social Connections: Not on file   Intimate Partner Violence: Not on file   Housing Stability: Not on file         ROS:   10 point review systems is otherwise negative except as per the HPI    Chief Complaint   Patient presents with    Coronary Artery Disease     Fv dx: Coronary artery disease due to a calcified coronary lesion: Status post CABG x3 in 1998. Occluded SVG to OM with a patent LIMA to the LAD in 2007

## 2025-06-12 DIAGNOSIS — E78.5 DYSLIPIDEMIA: ICD-10-CM

## 2025-06-13 RX ORDER — EVOLOCUMAB 140 MG/ML
INJECTION, SOLUTION SUBCUTANEOUS
Qty: 6 ML | Refills: 3 | Status: SHIPPED | OUTPATIENT
Start: 2025-06-13

## 2025-07-25 DIAGNOSIS — I10 ESSENTIAL HYPERTENSION: ICD-10-CM

## 2025-07-25 DIAGNOSIS — I25.119 CORONARY ARTERY DISEASE INVOLVING NATIVE CORONARY ARTERY OF NATIVE HEART WITH ANGINA PECTORIS (HCC): ICD-10-CM

## 2025-07-28 NOTE — TELEPHONE ENCOUNTER
Is the patient due for a refill? Yes    Was the patient seen the last 15 months? Yes    Date of last office visit: 5/30/25    Does the patient have an upcoming appointment?  No    Provider to refill:BE    Does the patient have snf Plus and need 100-day supply? (This applies to ALL medications) Patient does not have SCP

## 2025-07-29 RX ORDER — ATENOLOL 25 MG/1
25 TABLET ORAL
Qty: 90 TABLET | Refills: 2 | Status: SHIPPED | OUTPATIENT
Start: 2025-07-29

## 2025-07-31 ENCOUNTER — HOSPITAL ENCOUNTER (OUTPATIENT)
Facility: MEDICAL CENTER | Age: 65
End: 2025-07-31
Attending: FAMILY MEDICINE
Payer: COMMERCIAL

## 2025-07-31 LAB
ALBUMIN SERPL BCP-MCNC: 4.2 G/DL (ref 3.2–4.9)
ALBUMIN/GLOB SERPL: 1.7 G/DL
ALP SERPL-CCNC: 103 U/L (ref 30–99)
ALT SERPL-CCNC: 39 U/L (ref 2–50)
ANION GAP SERPL CALC-SCNC: 13 MMOL/L (ref 7–16)
AST SERPL-CCNC: 32 U/L (ref 12–45)
BASOPHILS # BLD AUTO: 1.1 % (ref 0–1.8)
BASOPHILS # BLD: 0.08 K/UL (ref 0–0.12)
BILIRUB SERPL-MCNC: 0.6 MG/DL (ref 0.1–1.5)
BUN SERPL-MCNC: 27 MG/DL (ref 8–22)
CALCIUM ALBUM COR SERPL-MCNC: 9.4 MG/DL (ref 8.5–10.5)
CALCIUM SERPL-MCNC: 9.6 MG/DL (ref 8.5–10.5)
CHLORIDE SERPL-SCNC: 105 MMOL/L (ref 96–112)
CHOLEST SERPL-MCNC: 145 MG/DL (ref 100–199)
CO2 SERPL-SCNC: 20 MMOL/L (ref 20–33)
CREAT SERPL-MCNC: 1.58 MG/DL (ref 0.5–1.4)
CREAT UR-MCNC: 77.2 MG/DL
EOSINOPHIL # BLD AUTO: 0.19 K/UL (ref 0–0.51)
EOSINOPHIL NFR BLD: 2.6 % (ref 0–6.9)
ERYTHROCYTE [DISTWIDTH] IN BLOOD BY AUTOMATED COUNT: 50.4 FL (ref 35.9–50)
EST. AVERAGE GLUCOSE BLD GHB EST-MCNC: 143 MG/DL
FASTING STATUS PATIENT QL REPORTED: NORMAL
GFR SERPLBLD CREATININE-BSD FMLA CKD-EPI: 49 ML/MIN/1.73 M 2
GLOBULIN SER CALC-MCNC: 2.5 G/DL (ref 1.9–3.5)
GLUCOSE SERPL-MCNC: 96 MG/DL (ref 65–99)
HBA1C MFR BLD: 6.6 % (ref 4–5.6)
HCT VFR BLD AUTO: 61.7 % (ref 42–52)
HDLC SERPL-MCNC: 33 MG/DL
HGB BLD-MCNC: 19 G/DL (ref 14–18)
IMM GRANULOCYTES # BLD AUTO: 0.11 K/UL (ref 0–0.11)
IMM GRANULOCYTES NFR BLD AUTO: 1.5 % (ref 0–0.9)
LDLC SERPL CALC-MCNC: 58 MG/DL
LYMPHOCYTES # BLD AUTO: 1.18 K/UL (ref 1–4.8)
LYMPHOCYTES NFR BLD: 16.4 % (ref 22–41)
MCH RBC QN AUTO: 26.8 PG (ref 27–33)
MCHC RBC AUTO-ENTMCNC: 30.8 G/DL (ref 32.3–36.5)
MCV RBC AUTO: 86.9 FL (ref 81.4–97.8)
MICROALBUMIN UR-MCNC: 91.9 MG/DL
MICROALBUMIN/CREAT UR: 1190 MG/G (ref 0–30)
MONOCYTES # BLD AUTO: 0.79 K/UL (ref 0–0.85)
MONOCYTES NFR BLD AUTO: 11 % (ref 0–13.4)
NEUTROPHILS # BLD AUTO: 4.84 K/UL (ref 1.82–7.42)
NEUTROPHILS NFR BLD: 67.4 % (ref 44–72)
NRBC # BLD AUTO: 0 K/UL
NRBC BLD-RTO: 0 /100 WBC (ref 0–0.2)
PLATELET # BLD AUTO: 164 K/UL (ref 164–446)
PMV BLD AUTO: 10.2 FL (ref 9–12.9)
POTASSIUM SERPL-SCNC: 4.4 MMOL/L (ref 3.6–5.5)
PROT SERPL-MCNC: 6.7 G/DL (ref 6–8.2)
PSA SERPL-MCNC: 1.2 NG/ML (ref 0–4)
RBC # BLD AUTO: 7.1 M/UL (ref 4.7–6.1)
SODIUM SERPL-SCNC: 138 MMOL/L (ref 135–145)
TRIGL SERPL-MCNC: 270 MG/DL (ref 0–149)
TSH SERPL DL<=0.005 MIU/L-ACNC: 2.74 UIU/ML (ref 0.38–5.33)
WBC # BLD AUTO: 7.2 K/UL (ref 4.8–10.8)

## 2025-07-31 PROCEDURE — 80061 LIPID PANEL: CPT

## 2025-07-31 PROCEDURE — 36415 COLL VENOUS BLD VENIPUNCTURE: CPT

## 2025-07-31 PROCEDURE — 83036 HEMOGLOBIN GLYCOSYLATED A1C: CPT

## 2025-07-31 PROCEDURE — 85025 COMPLETE CBC W/AUTO DIFF WBC: CPT

## 2025-07-31 PROCEDURE — 82570 ASSAY OF URINE CREATININE: CPT

## 2025-07-31 PROCEDURE — 84443 ASSAY THYROID STIM HORMONE: CPT

## 2025-07-31 PROCEDURE — 84153 ASSAY OF PSA TOTAL: CPT

## 2025-07-31 PROCEDURE — 82043 UR ALBUMIN QUANTITATIVE: CPT

## 2025-07-31 PROCEDURE — 80053 COMPREHEN METABOLIC PANEL: CPT

## 2025-08-27 DIAGNOSIS — I10 ESSENTIAL HYPERTENSION: ICD-10-CM

## 2025-08-28 RX ORDER — LOSARTAN POTASSIUM 100 MG/1
100 TABLET ORAL
Qty: 90 TABLET | Refills: 3 | Status: SHIPPED | OUTPATIENT
Start: 2025-08-28